# Patient Record
Sex: FEMALE | Race: WHITE | Employment: OTHER | URBAN - METROPOLITAN AREA
[De-identification: names, ages, dates, MRNs, and addresses within clinical notes are randomized per-mention and may not be internally consistent; named-entity substitution may affect disease eponyms.]

---

## 2017-02-16 RX ORDER — OXYBUTYNIN CHLORIDE 10 MG/1
TABLET, EXTENDED RELEASE ORAL
Qty: 90 TABLET | Refills: 3 | Status: SHIPPED | OUTPATIENT
Start: 2017-02-16 | End: 2018-02-11 | Stop reason: SDUPTHER

## 2017-04-12 ENCOUNTER — OFFICE VISIT (OUTPATIENT)
Dept: UROLOGY | Age: 77
End: 2017-04-12

## 2017-04-12 VITALS
SYSTOLIC BLOOD PRESSURE: 130 MMHG | BODY MASS INDEX: 34.99 KG/M2 | HEART RATE: 64 BPM | WEIGHT: 210 LBS | DIASTOLIC BLOOD PRESSURE: 80 MMHG | HEIGHT: 65 IN

## 2017-04-12 DIAGNOSIS — N32.81 OAB (OVERACTIVE BLADDER): Primary | ICD-10-CM

## 2017-04-12 PROCEDURE — 99212 OFFICE O/P EST SF 10 MIN: CPT | Performed by: UROLOGY

## 2017-04-12 RX ORDER — OYSTER SHELL CALCIUM WITH VITAMIN D 500; 200 MG/1; [IU]/1
1 TABLET, FILM COATED ORAL DAILY
COMMUNITY
End: 2021-07-21

## 2017-04-12 RX ORDER — TOLTERODINE 4 MG/1
4 CAPSULE, EXTENDED RELEASE ORAL DAILY
Qty: 90 CAPSULE | Refills: 3 | Status: SHIPPED | OUTPATIENT
Start: 2017-04-12 | End: 2018-05-01 | Stop reason: ALTCHOICE

## 2017-04-12 ASSESSMENT — ENCOUNTER SYMPTOMS: SHORTNESS OF BREATH: 0

## 2018-02-12 RX ORDER — OXYBUTYNIN CHLORIDE 10 MG/1
TABLET, EXTENDED RELEASE ORAL
Qty: 90 TABLET | Refills: 3 | Status: SHIPPED | OUTPATIENT
Start: 2018-02-12 | End: 2019-02-28 | Stop reason: SDUPTHER

## 2018-05-01 ENCOUNTER — OFFICE VISIT (OUTPATIENT)
Dept: UROLOGY | Age: 78
End: 2018-05-01
Payer: MEDICARE

## 2018-05-01 VITALS
DIASTOLIC BLOOD PRESSURE: 70 MMHG | SYSTOLIC BLOOD PRESSURE: 138 MMHG | WEIGHT: 200 LBS | HEART RATE: 62 BPM | HEIGHT: 65 IN | BODY MASS INDEX: 33.32 KG/M2

## 2018-05-01 DIAGNOSIS — N32.81 OAB (OVERACTIVE BLADDER): Primary | ICD-10-CM

## 2018-05-01 LAB
BILIRUBIN, POC: NORMAL
BLOOD URINE, POC: NORMAL
CLARITY, POC: CLEAR
COLOR, POC: YELLOW
GLUCOSE URINE, POC: NORMAL
KETONES, POC: NORMAL
LEUKOCYTE EST, POC: NORMAL
NITRITE, POC: NORMAL
PH, POC: 6
PROTEIN, POC: NORMAL
SPECIFIC GRAVITY, POC: 1.01
UROBILINOGEN, POC: 0.2

## 2018-05-01 PROCEDURE — 81003 URINALYSIS AUTO W/O SCOPE: CPT | Performed by: UROLOGY

## 2018-05-01 PROCEDURE — 99213 OFFICE O/P EST LOW 20 MIN: CPT | Performed by: UROLOGY

## 2018-05-01 ASSESSMENT — ENCOUNTER SYMPTOMS
SHORTNESS OF BREATH: 0
ABDOMINAL PAIN: 0

## 2019-02-06 PROBLEM — I10 ESSENTIAL (PRIMARY) HYPERTENSION: Status: ACTIVE | Noted: 2019-02-06

## 2019-02-06 PROBLEM — D69.6 THROMBOCYTOPENIA, UNSPECIFIED (HCC): Status: ACTIVE | Noted: 2019-02-06

## 2019-02-06 PROBLEM — D69.3 IMMUNE THROMBOCYTOPENIC PURPURA (HCC): Status: ACTIVE | Noted: 2019-02-06

## 2019-02-28 RX ORDER — OXYBUTYNIN CHLORIDE 10 MG/1
TABLET, EXTENDED RELEASE ORAL
Qty: 90 TABLET | Refills: 3 | Status: SHIPPED | OUTPATIENT
Start: 2019-02-28 | End: 2021-04-19

## 2019-05-02 ENCOUNTER — OFFICE VISIT (OUTPATIENT)
Dept: UROLOGY | Age: 79
End: 2019-05-02
Payer: MEDICARE

## 2019-05-02 VITALS
DIASTOLIC BLOOD PRESSURE: 70 MMHG | HEART RATE: 66 BPM | WEIGHT: 210 LBS | BODY MASS INDEX: 35.85 KG/M2 | HEIGHT: 64 IN | SYSTOLIC BLOOD PRESSURE: 142 MMHG

## 2019-05-02 DIAGNOSIS — N32.81 OAB (OVERACTIVE BLADDER): Primary | ICD-10-CM

## 2019-05-02 PROCEDURE — 99213 OFFICE O/P EST LOW 20 MIN: CPT | Performed by: UROLOGY

## 2019-05-02 PROCEDURE — 81003 URINALYSIS AUTO W/O SCOPE: CPT | Performed by: UROLOGY

## 2019-05-02 RX ORDER — OXYBUTYNIN CHLORIDE 15 MG/1
15 TABLET, EXTENDED RELEASE ORAL DAILY
Qty: 90 TABLET | Refills: 3 | Status: SHIPPED | OUTPATIENT
Start: 2019-05-02 | End: 2020-04-22

## 2019-05-02 ASSESSMENT — ENCOUNTER SYMPTOMS
ABDOMINAL PAIN: 0
SHORTNESS OF BREATH: 0
ABDOMINAL DISTENTION: 0

## 2019-05-02 NOTE — PROGRESS NOTES
Subjective:      Patient ID: Rossie Leventhal is a [de-identified] y.o. female. HPI  this is a [de-identified] white female with h/o HTN, GERD, OAB on Ditropan XL 10 mg daily back in follow-up. Since last seen on 5/1/18, she feels the Ditropan is not working as well. She gets occasional urgency and UI. She has no SE from the Ditropan. She has no hematuria or dysuria or pain. She has no new medical or surgical problems. She has no interval UTI's. She has no new surgical or medical problems. Past Medical History:   Diagnosis Date    High blood pressure     HIGH CHOLESTEROL     Overactive bladder      Past Surgical History:   Procedure Laterality Date    CHOLECYSTECTOMY      EYE SURGERY Bilateral     cataract    HYSTERECTOMY       Social History     Socioeconomic History    Marital status:      Spouse name: None    Number of children: None    Years of education: None    Highest education level: None   Occupational History    None   Social Needs    Financial resource strain: None    Food insecurity:     Worry: None     Inability: None    Transportation needs:     Medical: None     Non-medical: None   Tobacco Use    Smoking status: Never Smoker    Smokeless tobacco: Never Used   Substance and Sexual Activity    Alcohol use: Yes     Comment: occasionally    Drug use: No    Sexual activity: None   Lifestyle    Physical activity:     Days per week: None     Minutes per session: None    Stress: None   Relationships    Social connections:     Talks on phone: None     Gets together: None     Attends Taoism service: None     Active member of club or organization: None     Attends meetings of clubs or organizations: None     Relationship status: None    Intimate partner violence:     Fear of current or ex partner: None     Emotionally abused: None     Physically abused: None     Forced sexual activity: None   Other Topics Concern    None   Social History Narrative    None     History reviewed.  No pertinent family history. Current Outpatient Medications   Medication Sig Dispense Refill    oxybutynin (DITROPAN-XL) 10 MG extended release tablet TAKE 1 TABLET DAILY 90 tablet 3    calcium-vitamin D (OSCAL-500) 500-200 MG-UNIT per tablet Take 1 tablet by mouth daily      levothyroxine (SYNTHROID) 125 MCG tablet       amLODIPine (NORVASC) 2.5 MG tablet       Multiple Vitamins-Minerals (PRESERVISION AREDS) CAPS Take by mouth daily      Estradiol (VAGIFEM) 10 MCG TABS Place  vaginally daily.  Loperamide HCl (IMODIUM A-D PO) Take  by mouth as needed.  Multiple Vitamins-Minerals (THERAPEUTIC MULTIVITAMIN-MINERALS) tablet Take 1 tablet by mouth daily.  Omega-3 Fatty Acids (FISH OIL) 1000 MG CAPS Take 3,000 mg by mouth daily.  GLUCOS-CHOND-STEROL-FISH OIL PO Take  by mouth.  Ascorbic Acid (VITAMIN C) 100 MG tablet Take  by mouth daily.  Coenzyme Q10 (COQ-10) 10 MG CAPS Take  by mouth.  losartan (COZAAR) 100 MG tablet Take 100 mg by mouth daily.  Estradiol 10 MCG TABS Place  vaginally.  lovastatin (ALTOPREV) 20 MG ER tablet Take 20 mg by mouth nightly.  omeprazole (PRILOSEC OTC) 20 MG tablet Take 20 mg by mouth daily.  Artificial Tear (GENTEAL) GEL Apply  to eye.  Cholecalciferol (D3-1000 PO) Take  by mouth. No current facility-administered medications for this visit. Patient has no known allergies. reviewed    Review of Systems   Constitutional: Negative for unexpected weight change. Respiratory: Negative for shortness of breath. Cardiovascular: Negative for chest pain. Gastrointestinal: Negative for abdominal distention and abdominal pain. Genitourinary: Negative for difficulty urinating, dysuria and flank pain. Objective:   Physical Exam   Constitutional: She appears well-developed and well-nourished. Abdominal: Soft. She exhibits no distension and no mass. There is no tenderness. There is no guarding. Neurological: She is alert. Psychiatric: She has a normal mood and affect. 5/2/2019 11:13 AM - Edelmira Em CMA (AAMA)     Component Collected Lab   Color, UA 05/02/2019 11:13 AM Unknown   yellow    Clarity, UA 05/02/2019 11:13 AM Unknown   clear    Glucose, UA POC 05/02/2019 11:13 AM Unknown   neg    Bilirubin, UA 05/02/2019 11:13 AM Unknown   neg    Ketones, UA 05/02/2019 11:13 AM Unknown   neg    Spec Grav, UA 05/02/2019 11:13 AM Unknown   1.010    Blood, UA POC 05/02/2019 11:13 AM Unknown   neg    pH, UA 05/02/2019 11:13 AM Unknown   6.0    Protein, UA POC 05/02/2019 11:13 AM Unknown   neg    Urobilinogen, UA 05/02/2019 11:13 AM Unknown   0.2    Leukocytes, UA 05/02/2019 11:13 AM Unknown   neg    Nitrite, UA 05/02/2019 11:13 AM Unknown   neg        Assessment: This is a [de-identified] white female with h/o HTN, GERD, OAB on Ditropan XL 10 mg daily and with slight progression of urgency and UI and no SE from Ditropan. I discussed increasing the dose to 15 mg and she wants to proceed. The proper dosing and possible SE were reviewed. She has a normal U/A and no evidence for UTI by U/A today. Will continue with present management. I spent 15 minutes of which > 50% of the visit was spent counseling and coordinating care wrt the OAB and treatment options. Plan:      1. F/U 1 yr  2.  Ditropan 15 mg Xl daily, #90 with 3 refills        Arnold Gamez MD

## 2019-11-19 LAB
LV EF: 61 %
LVEF MODALITY: NORMAL

## 2019-12-03 LAB
ANION GAP SERPL CALCULATED.3IONS-SCNC: 13 MMOL/L (ref 10–20)
BICARBONATE: 26 MMOL/L (ref 21–32)
CALCIUM SERPL-MCNC: 9.1 MG/DL (ref 8.6–10.3)
CHLORIDE BLD-SCNC: 103 MMOL/L (ref 98–107)
CREAT SERPL-MCNC: 0.63 MG/DL (ref 0.5–1)
ERYTHROCYTE [DISTWIDTH] IN BLOOD BY AUTOMATED COUNT: 14 % (ref 11.5–14)
GFR AFRICAN AMERICAN: >60 ML/MIN/1.73M2
GFR NON-AFRICAN AMERICAN: >60 ML/MIN/1.73M2
GLUCOSE: 89 MG/DL (ref 74–99)
HCT VFR BLD CALC: 41.2 % (ref 36–46)
HEMOGLOBIN: 12.6 G/DL (ref 12–16)
MCHC RBC AUTO-ENTMCNC: 30.6 G/DL (ref 32–36)
MCV RBC AUTO: 87 FL (ref 80–100)
PLATELET # BLD: 396 X10E9/L (ref 150–450)
POTASSIUM SERPL-SCNC: 3.9 MMOL/L (ref 3.5–5.3)
RBC # BLD: 4.74 X10E12/L (ref 4–5.2)
SODIUM BLD-SCNC: 138 MMOL/L (ref 136–145)
UREA NITROGEN: 13 MG/DL (ref 6–23)
WBC: 8.7 X10E9/L (ref 4.4–11.3)

## 2019-12-06 ENCOUNTER — HOSPITAL ENCOUNTER (OUTPATIENT)
Dept: CARDIAC CATH/INVASIVE PROCEDURES | Age: 79
Discharge: HOME | End: 2019-12-06
Attending: INTERNAL MEDICINE | Admitting: INTERNAL MEDICINE
Payer: MEDICARE

## 2019-12-06 VITALS
HEIGHT: 64 IN | WEIGHT: 207 LBS | HEART RATE: 67 BPM | OXYGEN SATURATION: 97 % | SYSTOLIC BLOOD PRESSURE: 163 MMHG | RESPIRATION RATE: 14 BRPM | BODY MASS INDEX: 35.34 KG/M2 | DIASTOLIC BLOOD PRESSURE: 70 MMHG | TEMPERATURE: 97.7 F

## 2019-12-06 LAB
EKG ATRIAL RATE: 61 BPM
EKG P AXIS: 81 DEGREES
EKG P-R INTERVAL: 232 MS
EKG Q-T INTERVAL: 434 MS
EKG QRS DURATION: 106 MS
EKG QTC CALCULATION (BAZETT): 436 MS
EKG R AXIS: -31 DEGREES
EKG T AXIS: 84 DEGREES
EKG VENTRICULAR RATE: 61 BPM

## 2019-12-06 PROCEDURE — 2580000003 HC RX 258: Performed by: INTERNAL MEDICINE

## 2019-12-06 PROCEDURE — 93458 L HRT ARTERY/VENTRICLE ANGIO: CPT | Performed by: INTERNAL MEDICINE

## 2019-12-06 PROCEDURE — 2580000003 HC RX 258

## 2019-12-06 PROCEDURE — 6360000004 HC RX CONTRAST MEDICATION: Performed by: INTERNAL MEDICINE

## 2019-12-06 PROCEDURE — 6360000002 HC RX W HCPCS

## 2019-12-06 PROCEDURE — C1894 INTRO/SHEATH, NON-LASER: HCPCS

## 2019-12-06 PROCEDURE — 2709999900 HC NON-CHARGEABLE SUPPLY

## 2019-12-06 PROCEDURE — 2500000003 HC RX 250 WO HCPCS

## 2019-12-06 PROCEDURE — C1769 GUIDE WIRE: HCPCS

## 2019-12-06 RX ORDER — ISOSORBIDE MONONITRATE 30 MG/1
30 TABLET, EXTENDED RELEASE ORAL DAILY
Status: ON HOLD | COMMUNITY
End: 2019-12-06 | Stop reason: HOSPADM

## 2019-12-06 RX ORDER — SODIUM CHLORIDE 0.9 % (FLUSH) 0.9 %
10 SYRINGE (ML) INJECTION PRN
Status: DISCONTINUED | OUTPATIENT
Start: 2019-12-06 | End: 2019-12-06 | Stop reason: HOSPADM

## 2019-12-06 RX ORDER — ASPIRIN 81 MG/1
81 TABLET, CHEWABLE ORAL DAILY
COMMUNITY

## 2019-12-06 RX ORDER — CLOPIDOGREL BISULFATE 75 MG/1
75 TABLET ORAL DAILY
Status: ON HOLD | COMMUNITY
End: 2019-12-06 | Stop reason: HOSPADM

## 2019-12-06 RX ORDER — NITROGLYCERIN 0.4 MG/1
0.4 TABLET SUBLINGUAL EVERY 5 MIN PRN
Qty: 25 TABLET | Refills: 3 | Status: SHIPPED | OUTPATIENT
Start: 2019-12-06

## 2019-12-06 RX ORDER — SODIUM CHLORIDE 0.9 % (FLUSH) 0.9 %
10 SYRINGE (ML) INJECTION EVERY 12 HOURS SCHEDULED
Status: DISCONTINUED | OUTPATIENT
Start: 2019-12-06 | End: 2019-12-06 | Stop reason: HOSPADM

## 2019-12-06 RX ORDER — ACETAMINOPHEN 325 MG/1
650 TABLET ORAL EVERY 4 HOURS PRN
Status: DISCONTINUED | OUTPATIENT
Start: 2019-12-06 | End: 2019-12-06 | Stop reason: HOSPADM

## 2019-12-06 RX ORDER — SODIUM CHLORIDE 9 MG/ML
INJECTION, SOLUTION INTRAVENOUS CONTINUOUS
Status: DISCONTINUED | OUTPATIENT
Start: 2019-12-06 | End: 2019-12-06 | Stop reason: HOSPADM

## 2019-12-06 RX ORDER — IODIXANOL 320 MG/ML
90 INJECTION, SOLUTION INTRAVASCULAR ONCE
Status: COMPLETED | OUTPATIENT
Start: 2019-12-06 | End: 2019-12-06

## 2019-12-06 RX ADMIN — IODIXANOL 90 ML: 320 INJECTION, SOLUTION INTRAVASCULAR at 16:20

## 2019-12-06 RX ADMIN — SODIUM CHLORIDE: 9 INJECTION, SOLUTION INTRAVENOUS at 17:41

## 2020-04-22 RX ORDER — OXYBUTYNIN CHLORIDE 15 MG/1
TABLET, EXTENDED RELEASE ORAL
Qty: 90 TABLET | Refills: 3 | Status: SHIPPED | OUTPATIENT
Start: 2020-04-22 | End: 2021-05-18

## 2020-07-07 ENCOUNTER — OFFICE VISIT (OUTPATIENT)
Dept: UROLOGY | Age: 80
End: 2020-07-07
Payer: MEDICARE

## 2020-07-07 VITALS
WEIGHT: 210 LBS | HEART RATE: 62 BPM | DIASTOLIC BLOOD PRESSURE: 70 MMHG | SYSTOLIC BLOOD PRESSURE: 138 MMHG | HEIGHT: 65 IN | BODY MASS INDEX: 34.99 KG/M2

## 2020-07-07 PROCEDURE — 99213 OFFICE O/P EST LOW 20 MIN: CPT | Performed by: UROLOGY

## 2020-07-07 ASSESSMENT — ENCOUNTER SYMPTOMS
ABDOMINAL DISTENTION: 0
ABDOMINAL PAIN: 0
SHORTNESS OF BREATH: 0

## 2020-07-07 NOTE — PROGRESS NOTES
History Narrative    None     History reviewed. No pertinent family history. Current Outpatient Medications   Medication Sig Dispense Refill    oxybutynin (DITROPAN XL) 15 MG extended release tablet TAKE 1 TABLET DAILY 90 tablet 3    aspirin 81 MG chewable tablet Take 81 mg by mouth daily      nitroGLYCERIN (NITROSTAT) 0.4 MG SL tablet Place 1 tablet under the tongue every 5 minutes as needed for Chest pain up to max of 3 total doses. If no relief after 1 dose, call 911. 25 tablet 3    oxybutynin (DITROPAN-XL) 10 MG extended release tablet TAKE 1 TABLET DAILY 90 tablet 3    calcium-vitamin D (OSCAL-500) 500-200 MG-UNIT per tablet Take 1 tablet by mouth daily      levothyroxine (SYNTHROID) 125 MCG tablet       amLODIPine (NORVASC) 2.5 MG tablet Take 5 mg by mouth daily       Multiple Vitamins-Minerals (PRESERVISION AREDS) CAPS Take by mouth daily      Multiple Vitamins-Minerals (THERAPEUTIC MULTIVITAMIN-MINERALS) tablet Take 1 tablet by mouth daily.  Omega-3 Fatty Acids (FISH OIL) 1000 MG CAPS Take 3,000 mg by mouth daily.  GLUCOS-CHOND-STEROL-FISH OIL PO Take  by mouth.  Ascorbic Acid (VITAMIN C) 100 MG tablet Take  by mouth daily.  Coenzyme Q10 (COQ-10) 10 MG CAPS Take  by mouth.  losartan (COZAAR) 100 MG tablet Take 100 mg by mouth daily.  lovastatin (ALTOPREV) 20 MG ER tablet Take 20 mg by mouth nightly.  omeprazole (PRILOSEC OTC) 20 MG tablet Take 20 mg by mouth daily       Artificial Tear (GENTEAL) GEL Apply  to eye.  Cholecalciferol (D3-1000 PO) Take  by mouth. No current facility-administered medications for this visit. Patient has no known allergies. reviewed        Review of Systems   Constitutional: Negative for unexpected weight change. Respiratory: Negative for shortness of breath. Cardiovascular: Negative for chest pain. Gastrointestinal: Negative for abdominal distention and abdominal pain.    Genitourinary: Positive for frequency and urgency. Negative for dysuria, flank pain and hematuria. Objective:   Physical Exam  Constitutional:       Appearance: Normal appearance. Neurological:      Mental Status: She is alert. Psychiatric:         Mood and Affect: Mood normal.         Assessment: This is a 77 yo white female with h/o HTN, GERD, OAB on Ditropan XL 10 mg daily (failed higher dosing due to SE) and with  progression of frequency, urgency and UI. I discussed other medication options vs possible referral to Dr Fátima Dominguez for Sarath-Tox, Interstim evaluation and she wants the latter. She will continue with 10 mg Ditropan for now. I spent 15 minutes of which > 50% of the visit was spent counseling and coordinating care wrt the refractory OAB and treatment options.       Plan:      1. F/U 1 yr  2.  Refer to Dr Harvinder Fox MD

## 2020-07-27 ENCOUNTER — OFFICE VISIT (OUTPATIENT)
Dept: OBGYN CLINIC | Age: 80
End: 2020-07-27
Payer: MEDICARE

## 2020-07-27 VITALS
HEIGHT: 65 IN | WEIGHT: 214 LBS | DIASTOLIC BLOOD PRESSURE: 66 MMHG | SYSTOLIC BLOOD PRESSURE: 112 MMHG | BODY MASS INDEX: 35.65 KG/M2 | HEART RATE: 76 BPM

## 2020-07-27 PROCEDURE — 99203 OFFICE O/P NEW LOW 30 MIN: CPT | Performed by: OBSTETRICS & GYNECOLOGY

## 2020-07-27 NOTE — Clinical Note
Please call patient and check if patient has taken her medications she was given samples for mirabegron 25 mg p.o. daily for 2 weeks, and if the medication gave any relief. I asked the patient if she would like to follow-up if the medication has failed to further discuss options or perhaps try a higher dose of the same medication.

## 2020-08-29 NOTE — PROGRESS NOTES
Daniella Bettencourt is a 78 y.o. female who presents here today for complaints of urge urinary incontinence, patient has tried oxybutynin different strengths in the past including Detrol with no success. Patient was referred to me to discuss other options including Botox bladder injections or possible sacral nerve neuromodulation. Refered by Dr. Medardo Izaguirre urology. Vitals:  /66 (Site: Left Upper Arm, Position: Sitting, Cuff Size: Medium Adult)   Pulse 76   Ht 5' 4.5\" (1.638 m)   Wt 214 lb (97.1 kg)   BMI 36.17 kg/m²   Allergies:  Patient has no known allergies. Past Medical History:   Diagnosis Date    High blood pressure     HIGH CHOLESTEROL     Overactive bladder      Past Surgical History:   Procedure Laterality Date    CHOLECYSTECTOMY      EYE SURGERY Bilateral     cataract    HYSTERECTOMY       OB History        6    Para   4    Term                AB        Living           SAB        TAB        Ectopic        Molar        Multiple        Live Births                  No family history on file.   Social History     Socioeconomic History    Marital status:      Spouse name: Not on file    Number of children: Not on file    Years of education: Not on file    Highest education level: Not on file   Occupational History    Not on file   Social Needs    Financial resource strain: Not on file    Food insecurity     Worry: Not on file     Inability: Not on file    Transportation needs     Medical: Not on file     Non-medical: Not on file   Tobacco Use    Smoking status: Never Smoker    Smokeless tobacco: Never Used   Substance and Sexual Activity    Alcohol use: Yes     Comment: occasionally    Drug use: No    Sexual activity: Not on file   Lifestyle    Physical activity     Days per week: Not on file     Minutes per session: Not on file    Stress: Not on file   Relationships    Social connections     Talks on phone: Not on file     Gets together: Not on file Attends Buddhism service: Not on file     Active member of club or organization: Not on file     Attends meetings of clubs or organizations: Not on file     Relationship status: Not on file    Intimate partner violence     Fear of current or ex partner: Not on file     Emotionally abused: Not on file     Physically abused: Not on file     Forced sexual activity: Not on file   Other Topics Concern    Not on file   Social History Narrative    Not on file       Contraceptive method:  none    Patient's medications, allergies, past medical, surgical, social and family histories were reviewed and updated as appropriate. Review of Systems  As per chief complaint   All other systems reviewed and are negative. Physical Exam:  Vitals:  /66 (Site: Left Upper Arm, Position: Sitting, Cuff Size: Medium Adult)   Pulse 76   Ht 5' 4.5\" (1.638 m)   Wt 214 lb (97.1 kg)   BMI 36.17 kg/m²   Lungs: CTAB   Heart : Regular S1/S2, no M/R/G  Abdomen: Soft , NT, ND , + BS   Pelvic exam : deferred    Assessment:      Diagnosis Orders   1. Urge urinary incontinence         Plan:     Patient counseled about Botox as well as sacral nerve neuromodulation given that she had failed 2 previous medical treatments. I also counseled the patient as a last option to use beta 3 adrenergic agonist mirabegron or Myrbetriq as final trial for conservative treatment, given that its mode of action is different than the anticholinergics mentioned above and could be more effective. I also give the patient an option to opt out for 1 of the above-mentioned procedures. Patient mentions she would like to think about the procedure as but also would like to continue conservative treatment at this time, samples for mirabegron 25 mg to be taken once a day for the next 2 weeks given to patient. Patient follow-up in 2 weeks to discuss medication effectiveness    No orders of the defined types were placed in this encounter.     Orders Placed This Encounter   Medications    mirabegron (MYRBETRIQ) 25 MG TB24     Sig: Lot: [id number] Exp: 4/2022     Dispense:  14 tablet     Refill:  0     Patient was seen with total face to face time of 30 minutes. More than 50% of this visit was counseling and education regarding The encounter diagnosis was Urge urinary incontinence. and Incontinence (Discuss Interstim, Botox. Referred by Dr. Alva Estrada. )   as well as  counseling on preventative health maintenance follow-up. Follow Up:  No follow-ups on file.         Carin Phillips MD

## 2020-08-31 ENCOUNTER — OFFICE VISIT (OUTPATIENT)
Dept: OBGYN CLINIC | Age: 80
End: 2020-08-31
Payer: MEDICARE

## 2020-08-31 VITALS
BODY MASS INDEX: 35.82 KG/M2 | HEART RATE: 64 BPM | SYSTOLIC BLOOD PRESSURE: 136 MMHG | HEIGHT: 65 IN | DIASTOLIC BLOOD PRESSURE: 80 MMHG | WEIGHT: 215 LBS

## 2020-08-31 PROCEDURE — 99213 OFFICE O/P EST LOW 20 MIN: CPT | Performed by: OBSTETRICS & GYNECOLOGY

## 2020-08-31 NOTE — Clinical Note
Please check on patient, I presented earlier in the office for treatment of incontinence and was given mirabegron 50 mg, please check if patient has had any benefit, and if she had considered the other treatments discussed in previous visits Botox or sacral nerve neuromodulation [InterStim].

## 2020-09-21 NOTE — PROGRESS NOTES
Medication FollowUp     Leonides Flanagan is a 78y.o. year old female here todiscuss symptoms after use of medications prescribed last visit. Symptoms urgency, frequency, nocturia, incontinence. Medication/s prescribed mirabegron 25 mg 1 tablet p.o. daily. Side effects reported : none. Mentions symptomsimproved : no.     Vitals:  /80 (Site: Left Upper Arm, Position: Sitting, Cuff Size: Large Adult)   Pulse 64   Ht 5' 4.5\" (1.638 m)   Wt 215 lb (97.5 kg)   BMI 36.33 kg/m²   Allergies:  Patient has no known allergies. Past Medical History:   Diagnosis Date    High blood pressure     HIGH CHOLESTEROL     Overactive bladder         Past Surgical History:   Procedure Laterality Date    CHOLECYSTECTOMY      EYE SURGERY Bilateral     cataract    HYSTERECTOMY       OB History        6    Para   4    Term                AB        Living           SAB        TAB        Ectopic        Molar        Multiple        Live Births                  No family history on file.   Social History     Socioeconomic History    Marital status:      Spouse name: Not on file    Number of children: Not on file    Years of education: Not on file    Highest education level: Not on file   Occupational History    Not on file   Social Needs    Financial resource strain: Not on file    Food insecurity     Worry: Not on file     Inability: Not on file    Transportation needs     Medical: Not on file     Non-medical: Not on file   Tobacco Use    Smoking status: Never Smoker    Smokeless tobacco: Never Used   Substance and Sexual Activity    Alcohol use: Yes     Comment: occasionally    Drug use: No    Sexual activity: Not on file   Lifestyle    Physical activity     Days per week: Not on file     Minutes per session: Not on file    Stress: Not on file   Relationships    Social connections     Talks on phone: Not on file     Gets together: Not on file     Attends Bahai service: Not on file , Rfl:     losartan (COZAAR) 100 MG tablet, Take 100 mg by mouth daily. , Disp: , Rfl:     lovastatin (ALTOPREV) 20 MG ER tablet, Take 20 mg by mouth nightly.  , Disp: , Rfl:     omeprazole (PRILOSEC OTC) 20 MG tablet, Take 20 mg by mouth daily , Disp: , Rfl:     Artificial Tear (GENTEAL) GEL, Apply  to eye.  , Disp: , Rfl:     Cholecalciferol (D3-1000 PO), Take  by mouth.  , Disp: , Rfl:     Review of Systems  Review of Systems    All other systems reviewed and are negative. Physical exam :   General Appearance: alert and oriented to person, placeand time, well-developed and well-nourished, in no acute distress  Pulmonary/Chest:clear to auscultation bilaterally- no wheezes, rales or rhonchi, normal air movement,no respiratory distress  Cardiovascular: normal rate, normal S1 and S2, no gallops,intact distal pulses and no carotid bruits  Abdomen: soft, non-tender  Pelvic:deferred    Assessment:      Diagnosis Orders   1. Urge urinary incontinence         Was medication effective in resolving symptomsfor patient ?   no  Plan:     Increase mirabegron to 50 mg 1 tablet p.o. daily patient follow-up in 2 weeks. Have discussed other treatment modalities including Botox and sacral nerve neuromodulation patient on her annual visit, patient would like to continue trying medical treatment at this time would use the increased dose Myrbetriq trial for the next 2 to 4 weeks before deciding on procedure  Mirabegron samples given from our office    No orders of the defined types were placed in this encounter. No orders of the defined types were placed in this encounter. Follow up:  Return in about 2 weeks (around 9/14/2020) for medication assessment.         Meg Osborn MD

## 2020-10-08 NOTE — PROGRESS NOTES
Spoke to pt , she is undecided on what she would like to do. She declines appt at this time , and will call when she has decided.  The medication did not work for her

## 2020-10-09 NOTE — PROGRESS NOTES
Patient seen in ED, has no OBGYN provider on file. Call and check if needs follow up for Urge Urinary Incontinence (Primary)  and to schedule with our office.

## 2021-04-19 ENCOUNTER — OFFICE VISIT (OUTPATIENT)
Dept: UROLOGY | Age: 81
End: 2021-04-19
Payer: MEDICARE

## 2021-04-19 VITALS
HEART RATE: 55 BPM | SYSTOLIC BLOOD PRESSURE: 134 MMHG | DIASTOLIC BLOOD PRESSURE: 82 MMHG | BODY MASS INDEX: 33.97 KG/M2 | WEIGHT: 199 LBS | HEIGHT: 64 IN

## 2021-04-19 DIAGNOSIS — N32.81 OAB (OVERACTIVE BLADDER): ICD-10-CM

## 2021-04-19 DIAGNOSIS — R35.0 FREQUENCY OF URINATION: Primary | ICD-10-CM

## 2021-04-19 LAB
BILIRUBIN, POC: NORMAL
BLOOD URINE, POC: NORMAL
CLARITY, POC: CLEAR
COLOR, POC: YELLOW
GLUCOSE URINE, POC: NORMAL
KETONES, POC: NORMAL
LEUKOCYTE EST, POC: NORMAL
NITRITE, POC: NORMAL
PH, POC: 6.5
PROTEIN, POC: NORMAL
SPECIFIC GRAVITY, POC: 1.01
UROBILINOGEN, POC: 0.2

## 2021-04-19 PROCEDURE — 81003 URINALYSIS AUTO W/O SCOPE: CPT | Performed by: UROLOGY

## 2021-04-19 PROCEDURE — 99212 OFFICE O/P EST SF 10 MIN: CPT | Performed by: UROLOGY

## 2021-04-19 PROCEDURE — 51798 US URINE CAPACITY MEASURE: CPT | Performed by: UROLOGY

## 2021-04-19 ASSESSMENT — ENCOUNTER SYMPTOMS: ABDOMINAL PAIN: 0

## 2021-04-19 NOTE — PROGRESS NOTES
Subjective:      Patient ID: Elle Sarah is a 78 y.o. female. HPI this is a 79 yo white female with h/o HTN, GERD, and refractory OAB failed Ditropan XL 10 and 15 mg daily and Myrbetriq 50 mg started by Dr Darling Llanos last year. He also discussed other options for her OAB. She is considering Interstim and wanted to discuss with me. She feels her OAB has progressed and has no hematuria or pain or dysuria. She has no new medical or surgical problems. She has no interval UTI's.       Past Medical History:   Diagnosis Date    High blood pressure     HIGH CHOLESTEROL     Overactive bladder      Past Surgical History:   Procedure Laterality Date    CHOLECYSTECTOMY      EYE SURGERY Bilateral     cataract    HYSTERECTOMY       Social History     Socioeconomic History    Marital status:      Spouse name: None    Number of children: None    Years of education: None    Highest education level: None   Occupational History    None   Social Needs    Financial resource strain: None    Food insecurity     Worry: None     Inability: None    Transportation needs     Medical: None     Non-medical: None   Tobacco Use    Smoking status: Never Smoker    Smokeless tobacco: Never Used   Substance and Sexual Activity    Alcohol use: Yes     Comment: occasionally    Drug use: No    Sexual activity: None   Lifestyle    Physical activity     Days per week: None     Minutes per session: None    Stress: None   Relationships    Social connections     Talks on phone: None     Gets together: None     Attends Christianity service: None     Active member of club or organization: None     Attends meetings of clubs or organizations: None     Relationship status: None    Intimate partner violence     Fear of current or ex partner: None     Emotionally abused: None     Physically abused: None     Forced sexual activity: None   Other Topics Concern    None   Social History Narrative    None     History reviewed.  No pertinent family history. Current Outpatient Medications   Medication Sig Dispense Refill    oxybutynin (DITROPAN XL) 15 MG extended release tablet TAKE 1 TABLET DAILY 90 tablet 3    aspirin 81 MG chewable tablet Take 81 mg by mouth daily      nitroGLYCERIN (NITROSTAT) 0.4 MG SL tablet Place 1 tablet under the tongue every 5 minutes as needed for Chest pain up to max of 3 total doses. If no relief after 1 dose, call 911. 25 tablet 3    calcium-vitamin D (OSCAL-500) 500-200 MG-UNIT per tablet Take 1 tablet by mouth daily      levothyroxine (SYNTHROID) 125 MCG tablet       amLODIPine (NORVASC) 2.5 MG tablet Take 5 mg by mouth daily       Multiple Vitamins-Minerals (PRESERVISION AREDS) CAPS Take by mouth daily      Multiple Vitamins-Minerals (THERAPEUTIC MULTIVITAMIN-MINERALS) tablet Take 1 tablet by mouth daily.  Omega-3 Fatty Acids (FISH OIL) 1000 MG CAPS Take 3,000 mg by mouth daily.  GLUCOS-CHOND-STEROL-FISH OIL PO Take  by mouth.  Ascorbic Acid (VITAMIN C) 100 MG tablet Take  by mouth daily.  Coenzyme Q10 (COQ-10) 10 MG CAPS Take  by mouth.  losartan (COZAAR) 100 MG tablet Take 100 mg by mouth daily.  lovastatin (ALTOPREV) 20 MG ER tablet Take 20 mg by mouth nightly.  omeprazole (PRILOSEC OTC) 20 MG tablet Take 20 mg by mouth daily       Artificial Tear (GENTEAL) GEL Apply  to eye.  Cholecalciferol (D3-1000 PO) Take  by mouth. No current facility-administered medications for this visit. Patient has no known allergies. reviewed    Review of Systems   Gastrointestinal: Negative for abdominal pain. Genitourinary: Positive for frequency and urgency. Negative for dysuria, flank pain and hematuria. Objective:   Physical Exam  Constitutional:       Appearance: Normal appearance. Abdominal:      General: There is no distension. Neurological:      Mental Status: She is alert.    Psychiatric:         Mood and Affect: Mood normal. 4/19/2021  1:33 PM - Yakov Brown CMA (AAMA)    Component Collected Lab   Color, UA 04/19/2021  1:32 PM Unknown   yellow    Clarity, UA 04/19/2021  1:32 PM Unknown   clear    Glucose, UA POC 04/19/2021  1:32 PM Unknown   neg    Bilirubin, UA 04/19/2021  1:32 PM Unknown   neg    Ketones, UA 04/19/2021  1:32 PM Unknown   neg    Spec Grav, UA 04/19/2021  1:32 PM Unknown   1.010    Blood, UA POC 04/19/2021  1:32 PM Unknown   neg    pH, UA 04/19/2021  1:32 PM Unknown   6.5    Protein, UA POC 04/19/2021  1:32 PM Unknown   neg    Urobilinogen, UA 04/19/2021  1:32 PM Unknown   0.2    Leukocytes, UA 04/19/2021  1:32 PM Unknown   neg    Nitrite, UA 04/19/2021  1:32 PM Unknown   neg    Lab and Collection      post void residual by U/S: 82 cc    Assessment: This is a 77 yo white female with h/o HTN, GERD, OAB and failed multiple medication trial and has continued progression of frequency, urgency and UI. I encouraged her to consider Interstim trial and she wants to proceed. Plan:      1.  F/U prn  2 refer back to Dr Angela Dowell for Sussy Simon MD

## 2021-05-10 ENCOUNTER — OFFICE VISIT (OUTPATIENT)
Dept: OBGYN CLINIC | Age: 81
End: 2021-05-10
Payer: MEDICARE

## 2021-05-10 VITALS
DIASTOLIC BLOOD PRESSURE: 70 MMHG | BODY MASS INDEX: 33.97 KG/M2 | SYSTOLIC BLOOD PRESSURE: 116 MMHG | WEIGHT: 199 LBS | HEART RATE: 68 BPM | HEIGHT: 64 IN

## 2021-05-10 DIAGNOSIS — N39.41 URGE URINARY INCONTINENCE: Primary | ICD-10-CM

## 2021-05-10 PROCEDURE — 99213 OFFICE O/P EST LOW 20 MIN: CPT | Performed by: OBSTETRICS & GYNECOLOGY

## 2021-05-18 RX ORDER — OXYBUTYNIN CHLORIDE 15 MG/1
TABLET, EXTENDED RELEASE ORAL
Qty: 90 TABLET | Refills: 3 | Status: SHIPPED | OUTPATIENT
Start: 2021-05-18 | End: 2022-05-02

## 2021-06-04 NOTE — PROGRESS NOTES
Difficulty of Paying Living Expenses:    Food Insecurity:     Worried About Running Out of Food in the Last Year:     [address] in the Last Year:    Transportation Needs:     Lack of Transportation (Medical):  Lack of Transportation (Non-Medical):    Physical Activity:     Days of Exercise per Week:     Minutes of Exercise per Session:    Stress:     Feeling of Stress :    Social Connections:     Frequency of Communication with Friends and Family:     Frequency of Social Gatherings with Friends and Family:     Attends Sabianism Services:     Active Member of Clubs or Organizations:     Attends Club or Organization Meetings:     Marital Status:    Intimate Partner Violence:     Fear of Current or Ex-Partner:     Emotionally Abused:     Physically Abused:     Sexually Abused:        Contraceptive method:  none    Patient's medications, allergies, past medical, surgical, social and family histories were reviewed and updated as appropriate. Review of Systems  As per chief complaint   All other systems reviewed and are negative. urgency, frequency, incontinence, nocturia    Physical Exam:  Vitals:  /70 (Site: Left Upper Arm, Position: Sitting, Cuff Size: Large Adult)   Pulse 68   Ht 5' 4\" (1.626 m)   Wt 199 lb (90.3 kg)   BMI 34.16 kg/m²   Lungs: CTAB   Heart : Regular S1/S2, no M/R/G  Abdomen: Soft , NT, ND , + BS   Pelvic exam : deferred    Assessment:      Diagnosis Orders   1. Urge urinary incontinence         Plan:     Patient has failed several medications. Mirabegron used 1 year ago was not effective. I discussed botox bladder injections vs Sacral neuromodulation   Patient would like to discuss with family and return with final decision       No orders of the defined types were placed in this encounter. No orders of the defined types were placed in this encounter. Follow Up:  Return if symptoms worsen or fail to improve.         Princess James MD

## 2021-06-15 ENCOUNTER — PROCEDURE VISIT (OUTPATIENT)
Dept: OBGYN CLINIC | Age: 81
End: 2021-06-15
Payer: MEDICARE

## 2021-06-15 VITALS — BODY MASS INDEX: 34.16 KG/M2 | HEIGHT: 64 IN

## 2021-06-15 DIAGNOSIS — N39.41 URGE URINARY INCONTINENCE: Primary | ICD-10-CM

## 2021-06-15 PROCEDURE — 64561 IMPLANT NEUROELECTRODES: CPT | Performed by: OBSTETRICS & GYNECOLOGY

## 2021-06-15 NOTE — PROGRESS NOTES
Percutaneous nerve stimulation procedure     After procedure explained, verbal consent obtained, patient was prepped and draped for procedure. Patient laying flat on stomach. A measuring tape used and 11 cm from coccyx was measured and marked. 2 points were then marked in each side from the midline, 2 cm above and lateral to the 11 cm midline point marked earlier. 5 cc of local anaesthetic was then injected at these points and along a line at a 60 degrees angle directed towards patients legs , along the imaginary line of insertion to the sacral bone. The spinal needle provided with the interstim kit was then used and introduced at the right marked point at a 60 degree angle and advanced slowly till it hit resistence from sacral bone. More local anaethetic was injected at that point. The needle was withdrawn a little calibrated and reinserted 2 to 3 times until a spongy sensation was felt as the needle was introduced, with no bony resistence, at that point the needle was assumed to have entered the S3 sacral foramen. The interstim electrode was then hooked to needle and the patient did report fluttering sensation in rectal or vaginal areas or both, confirming probable proper placement of needle. Same steps were carried out on the left side. The wires provided were then introduced through the hollow of the spiral needles introduced earlier. At that point procedure was complete. Patient tolerated procedure. Kita Lizama M.D., F.A.C.O. G

## 2021-06-21 ENCOUNTER — TELEPHONE (OUTPATIENT)
Dept: OBGYN CLINIC | Age: 81
End: 2021-06-21

## 2021-06-21 NOTE — TELEPHONE ENCOUNTER
Patient had to reshcedule appointment on 6/25/2021, she states that she has vertigo. She has kyle rescheduled for 7/6/2021 @ 1:45pm, she would like to know if she would be able to take a shower.

## 2021-06-22 NOTE — TELEPHONE ENCOUNTER
Patient needs to be seen sooner than 7/6/21. The leads from PNE need to be taken out. She cannot take a full shower until those wires are removed.

## 2021-06-29 ENCOUNTER — OFFICE VISIT (OUTPATIENT)
Dept: OBGYN CLINIC | Age: 81
End: 2021-06-29
Payer: MEDICARE

## 2021-06-29 VITALS — WEIGHT: 200 LBS | BODY MASS INDEX: 34.33 KG/M2 | DIASTOLIC BLOOD PRESSURE: 74 MMHG | SYSTOLIC BLOOD PRESSURE: 136 MMHG

## 2021-06-29 DIAGNOSIS — N39.41 URGE URINARY INCONTINENCE: Primary | ICD-10-CM

## 2021-06-29 PROCEDURE — 99213 OFFICE O/P EST LOW 20 MIN: CPT | Performed by: OBSTETRICS & GYNECOLOGY

## 2021-06-29 RX ORDER — AMLODIPINE BESYLATE 5 MG/1
TABLET ORAL
COMMUNITY
Start: 2021-05-26

## 2021-06-29 RX ORDER — LEVOTHYROXINE SODIUM 112 MCG
112 TABLET ORAL DAILY
COMMUNITY
Start: 2021-06-07

## 2021-06-29 RX ORDER — ATORVASTATIN CALCIUM 20 MG/1
TABLET, FILM COATED ORAL
COMMUNITY
Start: 2021-05-26

## 2021-06-29 NOTE — PROGRESS NOTES
Kathia Valladares is a 78 y.o. female who presents here today for complaints of successful PNE trial for refractory urge incontinence failed multiple medical treatments in the past..  Mentions PNE improved her symptoms up to 80 and 90% at some days. Patient has had the PNE trial for the past 2 weeks and mentions that the device was not working as well for the past 3 or 4 days. Patient was not able to get in to take out the electrodes a week ago. Vitals:  /74   Wt 200 lb (90.7 kg)   BMI 34.33 kg/m²   Allergies:  Patient has no known allergies. Past Medical History:   Diagnosis Date    High blood pressure     HIGH CHOLESTEROL     Overactive bladder      Past Surgical History:   Procedure Laterality Date    CHOLECYSTECTOMY      EYE SURGERY Bilateral     cataract    HYSTERECTOMY       OB History        6    Para   4    Term                AB        Living           SAB        TAB        Ectopic        Molar        Multiple        Live Births                  No family history on file. Social History     Socioeconomic History    Marital status:      Spouse name: Not on file    Number of children: Not on file    Years of education: Not on file    Highest education level: Not on file   Occupational History    Not on file   Tobacco Use    Smoking status: Never Smoker    Smokeless tobacco: Never Used   Substance and Sexual Activity    Alcohol use: Yes     Comment: occasionally    Drug use: No    Sexual activity: Not on file   Other Topics Concern    Not on file   Social History Narrative    Not on file     Social Determinants of Health     Financial Resource Strain:     Difficulty of Paying Living Expenses:    Food Insecurity:     Worried About Running Out of Food in the Last Year:     [address] in the Last Year:    Transportation Needs:     Lack of Transportation (Medical):      Lack of Transportation (Non-Medical):    Physical Activity:     Days of Exercise per Week:     Minutes of Exercise per Session:    Stress:     Feeling of Stress :    Social Connections:     Frequency of Communication with Friends and Family:     Frequency of Social Gatherings with Friends and Family:     Attends Pent[employer] Services:     Active Member of Clubs or Organizations:     Attends Club or Organization Meetings:     Marital Status:    Intimate Partner Violence:     Fear of Current or Ex-Partner:     Emotionally Abused:     Physically Abused:     Sexually Abused:        Contraceptive method:  none    Patient's medications, allergies, past medical, surgical, social and family histories were reviewed and updated as appropriate. Review of Systems  As per chief complaint   All other systems reviewed and are negative. Physical Exam:  Vitals:  /74   Wt 200 lb (90.7 kg)   BMI 34.33 kg/m²   Lungs: CTAB   Heart : Regular S1/S2, no M/R/G  Abdomen: Soft , NT, ND , + BS   Pelvic exam : deferred    Assessment:      Diagnosis Orders   1. Urge urinary incontinence         Plan:     Successful PNE  To be scheduled for stage I and stage II InterStim  Sacral neuromodulation and PNE counseling   If your  symptoms have improved by at least 50 percent, you will be given the option to proceed with  InterStim placement. This procedure will be scheduled at the next available date convenient for you. InterStim Placement:  A permanent wire (lead) will be inserted and attached to a small neurostimulator battery that will be  implanted under the skin in the upper buttock. This will be done in the operating room as an  outpatient procedure with a local anesthetic and I.V. sedation. After the InterStim is implanted, you  will be given a patient  to adjust the stimulation from outside of the body. InterStim  batteries last from three to six years.  You may need to be seen in the office occasionally to have your  InterStim reprogrammed based on changes in your urinary

## 2021-07-21 ENCOUNTER — HOSPITAL ENCOUNTER (OUTPATIENT)
Dept: PREADMISSION TESTING | Age: 81
Discharge: HOME | End: 2021-07-25
Payer: MEDICARE

## 2021-07-21 VITALS
HEART RATE: 59 BPM | WEIGHT: 202.13 LBS | BODY MASS INDEX: 35.81 KG/M2 | SYSTOLIC BLOOD PRESSURE: 146 MMHG | TEMPERATURE: 98.4 F | OXYGEN SATURATION: 96 % | DIASTOLIC BLOOD PRESSURE: 68 MMHG | RESPIRATION RATE: 16 BRPM | HEIGHT: 63 IN

## 2021-07-21 LAB
ANION GAP SERPL CALCULATED.3IONS-SCNC: 11 MEQ/L (ref 9–15)
BUN BLDV-MCNC: 12 MG/DL (ref 8–23)
CALCIUM SERPL-MCNC: 8.9 MG/DL (ref 8.5–9.9)
CHLORIDE BLD-SCNC: 104 MEQ/L (ref 95–107)
CO2: 24 MEQ/L (ref 20–31)
CREAT SERPL-MCNC: 0.46 MG/DL (ref 0.5–0.9)
GFR AFRICAN AMERICAN: >60
GFR NON-AFRICAN AMERICAN: >60
GLUCOSE BLD-MCNC: 88 MG/DL (ref 70–99)
HCT VFR BLD CALC: 40.8 % (ref 37–47)
HEMOGLOBIN: 13.1 G/DL (ref 12–16)
MCH RBC QN AUTO: 26.5 PG (ref 27–31.3)
MCHC RBC AUTO-ENTMCNC: 32.1 % (ref 33–37)
MCV RBC AUTO: 82.8 FL (ref 82–100)
PDW BLD-RTO: 14.1 % (ref 11.5–14.5)
PLATELET # BLD: 325 K/UL (ref 130–400)
POTASSIUM SERPL-SCNC: 4 MEQ/L (ref 3.4–4.9)
RBC # BLD: 4.93 M/UL (ref 4.2–5.4)
SODIUM BLD-SCNC: 139 MEQ/L (ref 135–144)
WBC # BLD: 7.1 K/UL (ref 4.8–10.8)

## 2021-07-21 PROCEDURE — 93005 ELECTROCARDIOGRAM TRACING: CPT | Performed by: OBSTETRICS & GYNECOLOGY

## 2021-07-21 PROCEDURE — 85027 COMPLETE CBC AUTOMATED: CPT

## 2021-07-21 PROCEDURE — 80048 BASIC METABOLIC PNL TOTAL CA: CPT

## 2021-07-22 LAB
EKG ATRIAL RATE: 57 BPM
EKG P-R INTERVAL: 220 MS
EKG Q-T INTERVAL: 422 MS
EKG QRS DURATION: 108 MS
EKG QTC CALCULATION (BAZETT): 410 MS
EKG R AXIS: -38 DEGREES
EKG T AXIS: 94 DEGREES
EKG VENTRICULAR RATE: 57 BPM

## 2021-07-22 PROCEDURE — 93010 ELECTROCARDIOGRAM REPORT: CPT | Performed by: INTERNAL MEDICINE

## 2021-07-27 ENCOUNTER — ANESTHESIA EVENT (OUTPATIENT)
Dept: OPERATING ROOM | Age: 81
End: 2021-07-27
Payer: MEDICARE

## 2021-07-27 NOTE — ANESTHESIA PRE PROCEDURE
Department of Anesthesiology  Preprocedure Note       Name:  Christina Edwards   Age:  78 y.o.  :  1940                                          MRN:  [id number]         Date:  2021      Surgeon: Hermann Brennan):  Fredy Ortega MD    Procedure: Procedure(s):  STAGE 1 & STAGE 2 INTERSTIM    Medications prior to admission:   Prior to Admission medications    Medication Sig Start Date End Date Taking? Authorizing Provider   amLODIPine (NORVASC) 5 MG tablet TAKE 1 TABLET BY MOUTH DAILY 21   Historical Provider, MD   SYNTHROID 112 MCG tablet  21   Historical Provider, MD   atorvastatin (LIPITOR) 20 MG tablet TAKE 1 TABLET BY MOUTH DAILY AT BEDTIME 21   Historical Provider, MD   oxybutynin (DITROPAN XL) 15 MG extended release tablet TAKE 1 TABLET DAILY 21   Bindu Hill MD   aspirin 81 MG chewable tablet Take 81 mg by mouth daily    Historical Provider, MD   nitroGLYCERIN (NITROSTAT) 0.4 MG SL tablet Place 1 tablet under the tongue every 5 minutes as needed for Chest pain up to max of 3 total doses. If no relief after 1 dose, call 911. 19   Laith Maldonado MD   Multiple Vitamins-Minerals (PRESERVISION AREDS) CAPS Take by mouth daily    Historical Provider, MD   Multiple Vitamins-Minerals (THERAPEUTIC MULTIVITAMIN-MINERALS) tablet Take 1 tablet by mouth daily. Historical Provider, MD   losartan (COZAAR) 100 MG tablet Take 100 mg by mouth daily. Historical Provider, MD   omeprazole (PRILOSEC OTC) 20 MG tablet Take 20 mg by mouth daily     Historical Provider, MD   Artificial Tear (GENTEAL) GEL Apply  to eye. Historical Provider, MD   Cholecalciferol (D3-1000 PO) Take  by mouth. Historical Provider, MD       Current medications:    No current facility-administered medications for this encounter.      Current Outpatient Medications   Medication Sig Dispense Refill    amLODIPine (NORVASC) 5 MG tablet TAKE 1 TABLET BY MOUTH DAILY      SYNTHROID 112 MCG tablet  atorvastatin (LIPITOR) 20 MG tablet TAKE 1 TABLET BY MOUTH DAILY AT BEDTIME      oxybutynin (DITROPAN XL) 15 MG extended release tablet TAKE 1 TABLET DAILY 90 tablet 3    aspirin 81 MG chewable tablet Take 81 mg by mouth daily      nitroGLYCERIN (NITROSTAT) 0.4 MG SL tablet Place 1 tablet under the tongue every 5 minutes as needed for Chest pain up to max of 3 total doses. If no relief after 1 dose, call 911. 25 tablet 3    Multiple Vitamins-Minerals (PRESERVISION AREDS) CAPS Take by mouth daily      Multiple Vitamins-Minerals (THERAPEUTIC MULTIVITAMIN-MINERALS) tablet Take 1 tablet by mouth daily.  losartan (COZAAR) 100 MG tablet Take 100 mg by mouth daily.  omeprazole (PRILOSEC OTC) 20 MG tablet Take 20 mg by mouth daily       Artificial Tear (GENTEAL) GEL Apply  to eye.  Cholecalciferol (D3-1000 PO) Take  by mouth. 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    Thrombocytopenia, unspecified ([facility]) D69.6    Essential (primary) hypertension I10       Past Medical History:        Diagnosis Date    High blood pressure     HIGH CHOLESTEROL     Overactive bladder     Thyroid disease        Past Surgical History:        Procedure Laterality Date    CHOLECYSTECTOMY      EYE SURGERY Bilateral     cataract    HYSTERECTOMY         Social History:    Social History     Tobacco Use    Smoking status: Never Smoker    Smokeless tobacco: Never Used   Substance Use Topics    Alcohol use: Yes     Comment: occasionally                                Counseling given: Not Answered      Vital Signs (Current): There were no vitals filed for this visit.                                            BP Readings from Last 3 Encounters:   07/21/21 (!) 146/68   06/29/21 136/74   05/10/21 116/70       NPO Status:                                                                                 BMI: Wt Readings from Last 3 Encounters:   07/21/21 202 lb 2 oz (91.7 kg)   06/29/21 200 lb (90.7 kg)   05/10/21 199 lb (90.3 kg)     There is no height or weight on file to calculate BMI.    CBC:   Lab Results   Component Value Date    WBC 7.1 07/21/2021    RBC 4.93 07/21/2021    RBC 4.01 01/13/2012    HGB 13.1 07/21/2021    HCT 40.8 07/21/2021    MCV 82.8 07/21/2021    RDW 14.1 07/21/2021     07/21/2021       CMP:   Lab Results   Component Value Date     07/21/2021    K 4.0 07/21/2021     07/21/2021    CO2 24 07/21/2021    BUN 12 07/21/2021    CREATININE 0.46 07/21/2021    GFRAA >60.0 07/21/2021    LABGLOM >60.0 07/21/2021    GLUCOSE 88 07/21/2021    GLUCOSE 89 12/03/2019    PROT 6.6 01/21/2021    CALCIUM 8.9 07/21/2021    BILITOT 0.3 01/21/2021    ALKPHOS 91 01/21/2021    AST 15 01/21/2021    ALT 12 01/21/2021       POC Tests: No results for input(s): POCGLU, POCNA, POCK, POCCL, POCBUN, POCHEMO, POCHCT in the last 72 hours.     Coags:   Lab Results   Component Value Date    PROTIME 10.5 12/15/2011    INR 1.0 12/15/2011       HCG (If Applicable): No results found for: PREGTESTUR, PREGSERUM, HCG, HCGQUANT     ABGs: No results found for: PHART, PO2ART, ZTB1YGG, XAN5ULA, BEART, G0NNOBKQ     Type & Screen (If Applicable):  No results found for: LABABO, LABRH    Drug/Infectious Status (If Applicable):  No results found for: HIV, HEPCAB    COVID-19 Screening (If Applicable): No results found for: COVID19        Anesthesia Evaluation  Patient summary reviewed and Nursing notes reviewed no history of anesthetic complications:   Airway: Mallampati: II  TM distance: >3 FB   Neck ROM: full  Mouth opening: > = 3 FB Dental: normal exam         Pulmonary:Negative Pulmonary ROS                              Cardiovascular:    (+) hypertension:, hyperlipidemia      ECG reviewed               Beta Blocker:  Not on Beta Blocker         Neuro/Psych:   Negative Neuro/Psych ROS              GI/Hepatic/Renal:   (+) morbid obesity (class II obesity)          Endo/Other: Negative Endo/Other ROS                    Abdominal:             Vascular: negative vascular ROS. Other Findings:             Anesthesia Plan      general     ASA 3       Induction: intravenous. MIPS: Postoperative opioids intended and Prophylactic antiemetics administered. Anesthetic plan and risks discussed with patient.         Attending anesthesiologist reviewed and agrees with Valery Carvalho MD   7/27/2021

## 2021-07-28 ENCOUNTER — APPOINTMENT (OUTPATIENT)
Dept: GENERAL RADIOLOGY | Age: 81
End: 2021-07-28
Attending: OBSTETRICS & GYNECOLOGY
Payer: MEDICARE

## 2021-07-28 ENCOUNTER — HOSPITAL ENCOUNTER (OUTPATIENT)
Age: 81
Setting detail: OUTPATIENT SURGERY
Discharge: HOME | End: 2021-07-28
Attending: OBSTETRICS & GYNECOLOGY | Admitting: OBSTETRICS & GYNECOLOGY
Payer: MEDICARE

## 2021-07-28 ENCOUNTER — ANESTHESIA (OUTPATIENT)
Dept: OPERATING ROOM | Age: 81
End: 2021-07-28
Payer: MEDICARE

## 2021-07-28 VITALS
HEART RATE: 59 BPM | WEIGHT: 202.13 LBS | BODY MASS INDEX: 35.81 KG/M2 | DIASTOLIC BLOOD PRESSURE: 58 MMHG | HEIGHT: 63 IN | OXYGEN SATURATION: 96 % | TEMPERATURE: 97.7 F | RESPIRATION RATE: 16 BRPM | SYSTOLIC BLOOD PRESSURE: 132 MMHG

## 2021-07-28 VITALS
TEMPERATURE: 94.6 F | SYSTOLIC BLOOD PRESSURE: 199 MMHG | DIASTOLIC BLOOD PRESSURE: 87 MMHG | OXYGEN SATURATION: 91 % | RESPIRATION RATE: 13 BRPM

## 2021-07-28 DIAGNOSIS — G89.18 POSTOPERATIVE PAIN: Primary | ICD-10-CM

## 2021-07-28 PROCEDURE — C1778 LEAD, NEUROSTIMULATOR: HCPCS | Performed by: OBSTETRICS & GYNECOLOGY

## 2021-07-28 PROCEDURE — 2709999900 HC NON-CHARGEABLE SUPPLY: Performed by: OBSTETRICS & GYNECOLOGY

## 2021-07-28 PROCEDURE — 3700000000 HC ANESTHESIA ATTENDED CARE: Performed by: OBSTETRICS & GYNECOLOGY

## 2021-07-28 PROCEDURE — 2720000010 HC SURG SUPPLY STERILE: Performed by: OBSTETRICS & GYNECOLOGY

## 2021-07-28 PROCEDURE — 3700000001 HC ADD 15 MINUTES (ANESTHESIA): Performed by: OBSTETRICS & GYNECOLOGY

## 2021-07-28 PROCEDURE — 95972 ALYS CPLX SP/PN NPGT W/PRGRM: CPT | Performed by: OBSTETRICS & GYNECOLOGY

## 2021-07-28 PROCEDURE — 6360000002 HC RX W HCPCS: Performed by: OBSTETRICS & GYNECOLOGY

## 2021-07-28 PROCEDURE — 3600000014 HC SURGERY LEVEL 4 ADDTL 15MIN: Performed by: OBSTETRICS & GYNECOLOGY

## 2021-07-28 PROCEDURE — 7100000010 HC PHASE II RECOVERY - FIRST 15 MIN: Performed by: OBSTETRICS & GYNECOLOGY

## 2021-07-28 PROCEDURE — 3600000004 HC SURGERY LEVEL 4 BASE: Performed by: OBSTETRICS & GYNECOLOGY

## 2021-07-28 PROCEDURE — C1781 MESH (IMPLANTABLE): HCPCS | Performed by: OBSTETRICS & GYNECOLOGY

## 2021-07-28 PROCEDURE — 2580000003 HC RX 258: Performed by: OBSTETRICS & GYNECOLOGY

## 2021-07-28 PROCEDURE — 7100000000 HC PACU RECOVERY - FIRST 15 MIN: Performed by: OBSTETRICS & GYNECOLOGY

## 2021-07-28 PROCEDURE — 7100000011 HC PHASE II RECOVERY - ADDTL 15 MIN: Performed by: OBSTETRICS & GYNECOLOGY

## 2021-07-28 PROCEDURE — 2500000003 HC RX 250 WO HCPCS: Performed by: ANESTHESIOLOGY

## 2021-07-28 PROCEDURE — 64590 INS/RPL PRPH SAC/GSTR NPG/R: CPT | Performed by: OBSTETRICS & GYNECOLOGY

## 2021-07-28 PROCEDURE — 3209999900 FLUORO FOR SURGICAL PROCEDURES

## 2021-07-28 PROCEDURE — 7100000001 HC PACU RECOVERY - ADDTL 15 MIN: Performed by: OBSTETRICS & GYNECOLOGY

## 2021-07-28 PROCEDURE — C1767 GENERATOR, NEURO NON-RECHARG: HCPCS | Performed by: OBSTETRICS & GYNECOLOGY

## 2021-07-28 PROCEDURE — 2580000003 HC RX 258: Performed by: ANESTHESIOLOGY

## 2021-07-28 PROCEDURE — 6360000002 HC RX W HCPCS: Performed by: ANESTHESIOLOGY

## 2021-07-28 PROCEDURE — 2500000003 HC RX 250 WO HCPCS: Performed by: OBSTETRICS & GYNECOLOGY

## 2021-07-28 PROCEDURE — 64581 OPN IMPLTJ NEA SACRAL NERVE: CPT | Performed by: OBSTETRICS & GYNECOLOGY

## 2021-07-28 PROCEDURE — 76000 FLUOROSCOPY <1 HR PHYS/QHP: CPT | Performed by: OBSTETRICS & GYNECOLOGY

## 2021-07-28 DEVICE — POUCH TYRX NEURO ANTIMICROBIAL MED: Type: IMPLANTABLE DEVICE | Site: BUTTOCKS | Status: FUNCTIONAL

## 2021-07-28 DEVICE — KIT LEAD SURE SCAN INTERSTIM MRI: Type: IMPLANTABLE DEVICE | Site: SACRUM | Status: FUNCTIONAL

## 2021-07-28 DEVICE — Z DUP USE 2628873 GENERATOR NEUROSTIMULATOR H1.7XL2IN THK3IN TORQ WRNCH PROD: Type: IMPLANTABLE DEVICE | Site: BUTTOCKS | Status: FUNCTIONAL

## 2021-07-28 RX ORDER — SODIUM CHLORIDE 0.9 % (FLUSH) 0.9 %
10 SYRINGE (ML) INJECTION PRN
Status: DISCONTINUED | OUTPATIENT
Start: 2021-07-28 | End: 2021-07-28 | Stop reason: HOSPADM

## 2021-07-28 RX ORDER — PROPOFOL 10 MG/ML
INJECTION, EMULSION INTRAVENOUS PRN
Status: DISCONTINUED | OUTPATIENT
Start: 2021-07-28 | End: 2021-07-28 | Stop reason: SDUPTHER

## 2021-07-28 RX ORDER — FENTANYL CITRATE 50 UG/ML
INJECTION, SOLUTION INTRAMUSCULAR; INTRAVENOUS PRN
Status: DISCONTINUED | OUTPATIENT
Start: 2021-07-28 | End: 2021-07-28 | Stop reason: SDUPTHER

## 2021-07-28 RX ORDER — MAGNESIUM HYDROXIDE 1200 MG/15ML
LIQUID ORAL PRN
Status: DISCONTINUED | OUTPATIENT
Start: 2021-07-28 | End: 2021-07-28 | Stop reason: ALTCHOICE

## 2021-07-28 RX ORDER — FENTANYL CITRATE 50 UG/ML
50 INJECTION, SOLUTION INTRAMUSCULAR; INTRAVENOUS EVERY 10 MIN PRN
Status: DISCONTINUED | OUTPATIENT
Start: 2021-07-28 | End: 2021-07-28 | Stop reason: HOSPADM

## 2021-07-28 RX ORDER — CEPHALEXIN 500 MG/1
500 CAPSULE ORAL 4 TIMES DAILY
Qty: 28 CAPSULE | Refills: 0 | Status: ON HOLD | OUTPATIENT
Start: 2021-07-28 | End: 2022-01-16 | Stop reason: HOSPADM

## 2021-07-28 RX ORDER — MEPERIDINE HYDROCHLORIDE 25 MG/ML
12.5 INJECTION INTRAMUSCULAR; INTRAVENOUS; SUBCUTANEOUS EVERY 5 MIN PRN
Status: DISCONTINUED | OUTPATIENT
Start: 2021-07-28 | End: 2021-07-28 | Stop reason: HOSPADM

## 2021-07-28 RX ORDER — SODIUM CHLORIDE 9 MG/ML
25 INJECTION, SOLUTION INTRAVENOUS PRN
Status: DISCONTINUED | OUTPATIENT
Start: 2021-07-28 | End: 2021-07-28 | Stop reason: HOSPADM

## 2021-07-28 RX ORDER — LIDOCAINE HYDROCHLORIDE 10 MG/ML
1 INJECTION, SOLUTION EPIDURAL; INFILTRATION; INTRACAUDAL; PERINEURAL
Status: DISCONTINUED | OUTPATIENT
Start: 2021-07-28 | End: 2021-07-28 | Stop reason: HOSPADM

## 2021-07-28 RX ORDER — HYDROCODONE BITARTRATE AND ACETAMINOPHEN 5; 325 MG/1; MG/1
1 TABLET ORAL PRN
Status: DISCONTINUED | OUTPATIENT
Start: 2021-07-28 | End: 2021-07-28 | Stop reason: HOSPADM

## 2021-07-28 RX ORDER — HYDROCODONE BITARTRATE AND ACETAMINOPHEN 5; 325 MG/1; MG/1
2 TABLET ORAL PRN
Status: DISCONTINUED | OUTPATIENT
Start: 2021-07-28 | End: 2021-07-28 | Stop reason: HOSPADM

## 2021-07-28 RX ORDER — KETOROLAC TROMETHAMINE 30 MG/ML
30 INJECTION, SOLUTION INTRAMUSCULAR; INTRAVENOUS EVERY 6 HOURS
Status: DISCONTINUED | OUTPATIENT
Start: 2021-07-28 | End: 2021-07-28 | Stop reason: DRUGHIGH

## 2021-07-28 RX ORDER — IBUPROFEN 800 MG/1
800 TABLET ORAL EVERY 6 HOURS PRN
Qty: 60 TABLET | Refills: 0 | Status: ON HOLD | OUTPATIENT
Start: 2021-07-28 | End: 2022-01-16 | Stop reason: HOSPADM

## 2021-07-28 RX ORDER — KETOROLAC TROMETHAMINE 15 MG/ML
15 INJECTION, SOLUTION INTRAMUSCULAR; INTRAVENOUS EVERY 6 HOURS
Status: DISCONTINUED | OUTPATIENT
Start: 2021-07-28 | End: 2021-07-28 | Stop reason: HOSPADM

## 2021-07-28 RX ORDER — ACETAMINOPHEN 325 MG/1
650 TABLET ORAL EVERY 4 HOURS PRN
Status: DISCONTINUED | OUTPATIENT
Start: 2021-07-28 | End: 2021-07-28 | Stop reason: HOSPADM

## 2021-07-28 RX ORDER — SODIUM CHLORIDE 0.9 % (FLUSH) 0.9 %
5-40 SYRINGE (ML) INJECTION EVERY 12 HOURS SCHEDULED
Status: DISCONTINUED | OUTPATIENT
Start: 2021-07-28 | End: 2021-07-28 | Stop reason: HOSPADM

## 2021-07-28 RX ORDER — ONDANSETRON 2 MG/ML
4 INJECTION INTRAMUSCULAR; INTRAVENOUS EVERY 6 HOURS PRN
Status: DISCONTINUED | OUTPATIENT
Start: 2021-07-28 | End: 2021-07-28 | Stop reason: HOSPADM

## 2021-07-28 RX ORDER — SODIUM CHLORIDE 0.9 % (FLUSH) 0.9 %
10 SYRINGE (ML) INJECTION EVERY 12 HOURS SCHEDULED
Status: DISCONTINUED | OUTPATIENT
Start: 2021-07-28 | End: 2021-07-28 | Stop reason: HOSPADM

## 2021-07-28 RX ORDER — DIPHENHYDRAMINE HYDROCHLORIDE 50 MG/ML
12.5 INJECTION INTRAMUSCULAR; INTRAVENOUS
Status: DISCONTINUED | OUTPATIENT
Start: 2021-07-28 | End: 2021-07-28 | Stop reason: HOSPADM

## 2021-07-28 RX ORDER — SUCCINYLCHOLINE/SOD CL,ISO/PF 100 MG/5ML
SYRINGE (ML) INTRAVENOUS PRN
Status: DISCONTINUED | OUTPATIENT
Start: 2021-07-28 | End: 2021-07-28 | Stop reason: SDUPTHER

## 2021-07-28 RX ORDER — ACETAMINOPHEN 500 MG
1000 TABLET ORAL EVERY 6 HOURS PRN
Qty: 60 TABLET | Refills: 1 | Status: SHIPPED | OUTPATIENT
Start: 2021-07-28

## 2021-07-28 RX ORDER — SODIUM CHLORIDE 0.9 % (FLUSH) 0.9 %
5-40 SYRINGE (ML) INJECTION PRN
Status: DISCONTINUED | OUTPATIENT
Start: 2021-07-28 | End: 2021-07-28 | Stop reason: HOSPADM

## 2021-07-28 RX ORDER — OXYCODONE HYDROCHLORIDE AND ACETAMINOPHEN 5; 325 MG/1; MG/1
2 TABLET ORAL EVERY 4 HOURS PRN
Status: DISCONTINUED | OUTPATIENT
Start: 2021-07-28 | End: 2021-07-28 | Stop reason: HOSPADM

## 2021-07-28 RX ORDER — LIDOCAINE HYDROCHLORIDE 20 MG/ML
INJECTION, SOLUTION INTRAVENOUS PRN
Status: DISCONTINUED | OUTPATIENT
Start: 2021-07-28 | End: 2021-07-28 | Stop reason: SDUPTHER

## 2021-07-28 RX ORDER — SODIUM CHLORIDE, SODIUM LACTATE, POTASSIUM CHLORIDE, CALCIUM CHLORIDE 600; 310; 30; 20 MG/100ML; MG/100ML; MG/100ML; MG/100ML
INJECTION, SOLUTION INTRAVENOUS CONTINUOUS
Status: DISCONTINUED | OUTPATIENT
Start: 2021-07-28 | End: 2021-07-28 | Stop reason: HOSPADM

## 2021-07-28 RX ORDER — ONDANSETRON 4 MG/1
4 TABLET, ORALLY DISINTEGRATING ORAL EVERY 8 HOURS PRN
Status: DISCONTINUED | OUTPATIENT
Start: 2021-07-28 | End: 2021-07-28 | Stop reason: HOSPADM

## 2021-07-28 RX ORDER — LIDOCAINE HYDROCHLORIDE AND EPINEPHRINE 10; 10 MG/ML; UG/ML
INJECTION, SOLUTION INFILTRATION; PERINEURAL PRN
Status: DISCONTINUED | OUTPATIENT
Start: 2021-07-28 | End: 2021-07-28 | Stop reason: ALTCHOICE

## 2021-07-28 RX ORDER — ONDANSETRON 2 MG/ML
INJECTION INTRAMUSCULAR; INTRAVENOUS PRN
Status: DISCONTINUED | OUTPATIENT
Start: 2021-07-28 | End: 2021-07-28 | Stop reason: SDUPTHER

## 2021-07-28 RX ORDER — METOCLOPRAMIDE HYDROCHLORIDE 5 MG/ML
10 INJECTION INTRAMUSCULAR; INTRAVENOUS
Status: DISCONTINUED | OUTPATIENT
Start: 2021-07-28 | End: 2021-07-28 | Stop reason: HOSPADM

## 2021-07-28 RX ORDER — GLYCOPYRROLATE 1 MG/5 ML
SYRINGE (ML) INTRAVENOUS PRN
Status: DISCONTINUED | OUTPATIENT
Start: 2021-07-28 | End: 2021-07-28 | Stop reason: SDUPTHER

## 2021-07-28 RX ORDER — MIDAZOLAM HYDROCHLORIDE 2 MG/2ML
INJECTION, SOLUTION INTRAMUSCULAR; INTRAVENOUS PRN
Status: DISCONTINUED | OUTPATIENT
Start: 2021-07-28 | End: 2021-07-28 | Stop reason: SDUPTHER

## 2021-07-28 RX ORDER — OXYCODONE HYDROCHLORIDE AND ACETAMINOPHEN 5; 325 MG/1; MG/1
2 TABLET ORAL NIGHTLY PRN
Qty: 10 TABLET | Refills: 0 | Status: SHIPPED | OUTPATIENT
Start: 2021-07-28 | End: 2021-08-02

## 2021-07-28 RX ORDER — ONDANSETRON 2 MG/ML
4 INJECTION INTRAMUSCULAR; INTRAVENOUS
Status: DISCONTINUED | OUTPATIENT
Start: 2021-07-28 | End: 2021-07-28 | Stop reason: HOSPADM

## 2021-07-28 RX ADMIN — ONDANSETRON 4 MG: 2 INJECTION INTRAMUSCULAR; INTRAVENOUS at 07:53

## 2021-07-28 RX ADMIN — MIDAZOLAM HYDROCHLORIDE 2 MG: 1 INJECTION, SOLUTION INTRAMUSCULAR; INTRAVENOUS at 07:34

## 2021-07-28 RX ADMIN — FENTANYL CITRATE 50 MCG: 50 INJECTION, SOLUTION INTRAMUSCULAR; INTRAVENOUS at 08:23

## 2021-07-28 RX ADMIN — KETOROLAC TROMETHAMINE 15 MG: 15 INJECTION, SOLUTION INTRAMUSCULAR; INTRAVENOUS at 09:36

## 2021-07-28 RX ADMIN — SODIUM CHLORIDE, POTASSIUM CHLORIDE, SODIUM LACTATE AND CALCIUM CHLORIDE 1000 ML: 600; 310; 30; 20 INJECTION, SOLUTION INTRAVENOUS at 06:42

## 2021-07-28 RX ADMIN — CEFOXITIN SODIUM 2000 MG: 2 POWDER, FOR SOLUTION INTRAVENOUS at 07:51

## 2021-07-28 RX ADMIN — PROPOFOL 160 MG: 10 INJECTION, EMULSION INTRAVENOUS at 07:40

## 2021-07-28 RX ADMIN — FENTANYL CITRATE 50 MCG: 50 INJECTION, SOLUTION INTRAMUSCULAR; INTRAVENOUS at 07:40

## 2021-07-28 RX ADMIN — Medication 100 MG: at 07:40

## 2021-07-28 RX ADMIN — LIDOCAINE HYDROCHLORIDE 40 MG: 20 INJECTION, SOLUTION INTRAVENOUS at 07:40

## 2021-07-28 RX ADMIN — Medication 0.3 MG: at 08:12

## 2021-07-28 ASSESSMENT — PULMONARY FUNCTION TESTS
PIF_VALUE: 2
PIF_VALUE: 21
PIF_VALUE: 21
PIF_VALUE: 22
PIF_VALUE: 20
PIF_VALUE: 22
PIF_VALUE: 2
PIF_VALUE: 22
PIF_VALUE: 22
PIF_VALUE: 21
PIF_VALUE: 2
PIF_VALUE: 4
PIF_VALUE: 22
PIF_VALUE: 21
PIF_VALUE: 21
PIF_VALUE: 22
PIF_VALUE: 23
PIF_VALUE: 23
PIF_VALUE: 21
PIF_VALUE: 20
PIF_VALUE: 24
PIF_VALUE: 20
PIF_VALUE: 20
PIF_VALUE: 18
PIF_VALUE: 1
PIF_VALUE: 22
PIF_VALUE: 21
PIF_VALUE: 7
PIF_VALUE: 1
PIF_VALUE: 20
PIF_VALUE: 21
PIF_VALUE: 22
PIF_VALUE: 21
PIF_VALUE: 20
PIF_VALUE: 22
PIF_VALUE: 23
PIF_VALUE: 22
PIF_VALUE: 22
PIF_VALUE: 21
PIF_VALUE: 22
PIF_VALUE: 21
PIF_VALUE: 2
PIF_VALUE: 22
PIF_VALUE: 22
PIF_VALUE: 1
PIF_VALUE: 2
PIF_VALUE: 22
PIF_VALUE: 21
PIF_VALUE: 1
PIF_VALUE: 22
PIF_VALUE: 22
PIF_VALUE: 21
PIF_VALUE: 1
PIF_VALUE: 22
PIF_VALUE: 23
PIF_VALUE: 20
PIF_VALUE: 22
PIF_VALUE: 22
PIF_VALUE: 21
PIF_VALUE: 1
PIF_VALUE: 21
PIF_VALUE: 22
PIF_VALUE: 22
PIF_VALUE: 3
PIF_VALUE: 21
PIF_VALUE: 20
PIF_VALUE: 22
PIF_VALUE: 22
PIF_VALUE: 8
PIF_VALUE: 21
PIF_VALUE: 17
PIF_VALUE: 24

## 2021-07-28 NOTE — PROGRESS NOTES
The dose has been adjusted from Toradol 30 mg IV q6 hours x 8 doses to Toradol 15 mg IV q 6 hours based off of  recommendations for patients greater than 72years of age due to risks.

## 2021-07-28 NOTE — H&P
Dharmesh Moss is a 78 y.o. female who presents here today for complaints of successful PNE trial for refractory urge incontinence failed multiple medical treatments in the past..  Mentions PNE improved her symptoms up to 80 and 90% at some days. Patient has had the PNE trial for the past 2 weeks and mentions that the device was not working as well for the past 3 or 4 days. Patient was not able to get in to take out the electrodes a week ago.        Vitals:  /74   Wt 200 lb (90.7 kg)   BMI 34.33 kg/m²   Allergies:  Patient has no known allergies. Past Medical History        Past Medical History:   Diagnosis Date    High blood pressure      HIGH CHOLESTEROL      Overactive bladder           Past Surgical History         Past Surgical History:   Procedure Laterality Date    CHOLECYSTECTOMY        EYE SURGERY Bilateral       cataract    HYSTERECTOMY                      OB History         6    Para   4    Term                AB        Living            SAB        TAB        Ectopic        Molar        Multiple        Live Births                    Family History   No family history on file. Social History               Socioeconomic History    Marital status:        Spouse name: Not on file    Number of children: Not on file    Years of education: Not on file    Highest education level: Not on file   Occupational History    Not on file   Tobacco Use    Smoking status: Never Smoker    Smokeless tobacco: Never Used   Substance and Sexual Activity    Alcohol use:  Yes       Comment: occasionally    Drug use: No    Sexual activity: Not on file   Other Topics Concern    Not on file   Social History Narrative    Not on file      Social Determinants of Health          Financial Resource Strain:     Difficulty of Paying Living Expenses:    Food Insecurity:     Worried About Running Out of Food in the Last Year:     [address] in the Last Year:    Transportation Needs:     Lack of Transportation (Medical):  Lack of Transportation (Non-Medical):    Physical Activity:     Days of Exercise per Week:     Minutes of Exercise per Session:    Stress:     Feeling of Stress :    Social Connections:     Frequency of Communication with Friends and Family:     Frequency of Social Gatherings with Friends and Family:     Attends Sikh Services:     Active Member of Clubs or Organizations:     Attends Club or Organization Meetings:     Marital Status:    Intimate Partner Violence:     Fear of Current or Ex-Partner:     Emotionally Abused:     Physically Abused:     Sexually Abused:             Contraceptive method:  none     Patient's medications, allergies, past medical, surgical, social and family histories were reviewed and updated as appropriate.     Review of Systems  As per chief complaint   All other systems reviewed and are negative.     Physical Exam:  Vitals:  /74   Wt 200 lb (90.7 kg)   BMI 34.33 kg/m²   Lungs: CTAB   Heart : Regular S1/S2, no M/R/G  Abdomen: Soft , NT, ND , + BS   Pelvic exam : deferred     Assessment:        Diagnosis Orders   1. Urge urinary incontinence            Plan:      Successful PNE   scheduled for stage I and stage II InterStim  Sacral neuromodulation and PNE counseling   If your  symptoms have improved by at least 50 percent, you will be given the option to proceed with  InterStim placement. This procedure will be scheduled at the next available date convenient for you. InterStim Placement:  A permanent wire (lead) will be inserted and attached to a small neurostimulator battery that will be  implanted under the skin in the upper buttock. This will be done in the operating room as an  outpatient procedure with a local anesthetic and I.V. sedation. After the InterStim is implanted, you  will be given a patient  to adjust the stimulation from outside of the body.  InterStim  batteries last from three to six years.  You may need to be seen in the office occasionally to have your  InterStim reprogrammed based on changes in your urinary symptoms.           No orders of the defined types were placed in this encounter.       Encounter Medications    No orders of the defined types were placed in this encounter.         Follow Up:  No follow-ups on file.  Izzy Davis MD

## 2021-07-28 NOTE — ANESTHESIA POSTPROCEDURE EVALUATION
Department of Anesthesiology  Postprocedure Note    Patient: Conchis Duron  MRN: [id number]  YOB: 1940  Date of evaluation: 7/28/2021  Time:  8:58 AM     Procedure Summary     Date: 07/28/21 Room / Location: [address]    Anesthesia Start: 5344 Anesthesia Stop: 7971    Procedure: STAGE 1 & STAGE 2 INTERSTIM (N/A ) Diagnosis: (REFRACTORY URGE INCONTINENCE)    Surgeons: Bernadette Menchaca MD Responsible Provider: Norma Roger MD    Anesthesia Type: general ASA Status: 3          Anesthesia Type: general    Marshal Phase I:      Marshal Phase II:      Last vitals: Reviewed and per EMR flowsheets.        Anesthesia Post Evaluation    Patient location during evaluation: PACU  Patient participation: waiting for patient participation  Level of consciousness: sleepy but conscious  Airway patency: patent  Nausea & Vomiting: no nausea and no vomiting  Complications: no  Cardiovascular status: blood pressure returned to baseline and hemodynamically stable  Respiratory status: acceptable and nasal cannula  Hydration status: euvolemic

## 2021-07-28 NOTE — OP NOTE
Patient: Jason Hunter  YOB: 1940  MRN: [id number]    Date of Procedure: 7/28/2021    Pre-Op Diagnosis: REFRACTORY URGE INCONTINENCE     Post-Op Diagnosis: Same       Procedure(s):  STAGE 1 & STAGE 2 INTERSTIM    Surgeon(s):  Cyril Bojorquez MD    Assistant:  First Assistant: Yamile Miller    Anesthesia: General    Estimated Blood Loss (mL): Minimal    Complications: None    Specimens:   * No specimens in log *    Implants:  Implant Name Type Inv. Item Serial No.  Lot No. LRB No. Used Action   KIT LEAD SURE SCAN INTERSTIM MRI Spine:Stimulator KIT LEAD SURE SCAN INTERSTIM MRI  [employer] [place] [employer] [id number] Right 1 Implanted   GENERATOR NEUROSTIMULATOR H1.7XL2IN THK3IN TORQ [facility]  PROD - [id number]  GENERATOR NEUROSTIMULATOR H1.7XL2IN Shell[employer] [facility]  PROD [id number] [employer]WD  Right 1 Implanted   POUCH TYRX NEURO ANTIMICROBIAL MED Vascular/Graft/Patch/Filter POUCH TYRX NEURO ANTIMICROBIAL MED  [employer] [place] [employer]PMM [id number] Right 1 Implanted         Drains: * No LDAs found *    Findings: as above         Interstim 1 and 2      Description: Stage I and II neuromodulator.     PREOPERATIVE DIAGNOSIS: Refractory urgency and frequency with fecal incontinence.      POSTOPERATIVE DIAGNOSIS: Refractory urgency and frequency.     OPERATION: Stage I and II neuromodulator.     ANESTHESIA: General anaesthesia.     ESTIMATED BLOOD LOSS: Minimal.     FLUIDS: Crystalloid. The patient was given cefoxitin preop antibiotic.       The patient was tried on oxybutynin and mirabegron for over 1 month. The patient had pretty much failure from each of the procedure. The patient had less than 20% improvement with anticholinergics. Options such as continuously trying anticholinergics, continuation of the Kegel exercises, and trial of InterStim were discussed.  The patient was interested in the trial. The patient had percutaneous InterStim trial in the office with over 70% to 80% improvement in her urgency, frequency, and urge incontinence. The patient was significantly satisfied with the results and wanted to proceed with stage I and II neuromodulator. Risks of anesthesia, bleeding, infection, pain, MI, DVT, and PE were discussed. Risk of failure of the procedure in the future was discussed.     Risk of lead migration that the treatment may or may not work in the long-term basis and data on the long term were not clear were discussed with the patient. The patient understood and wanted to proceed with stage I and II neuromodulator. Consent was obtained.     DETAILS OF THE OPERATION: The patient was brought to the OR. The patient was placed in prone position. A pillow was placed underneath her pelvis area to slightly lift the pelvis up. The patient under general anesthesia through the IV, . The patient's back was prepped and draped in the usual sterile fashion. Under fluoroscopy, the needle placement on left side was confirmed. Also by the toe movement and anal sphincter contractions indicating the proper positioning of the needle. A wire was placed. The tract was dilated and lead was placed. The patient felt tapping in the vaginal area, which is an indication that the lead is in its proper position. Most of the leads had very low amplitude and stimulation. Lead was tunneled under the skin and was brought out through an incision on the left upper buttocks. Please note that the lidocaine was injected prior to the tunneling. A pouch was created about 1 cm beneath the subcutaneous tissue over the muscle where the actual unit was connected to the lead. Screws were turned and they were dropped. Attention was made to ensure that the lead was all the way in into the InterStim. Irrigation was performed Unit was then placed in the pouch . Impedance was checked. Irrigation was again performed with antibiotic irrigation solution. The needle site was closed using 4-0 Monocryl.  The pouch was closed using 4-0 Vicryl and the subcutaneous tissue with 2-0 Vicryl. Incision covered.     The patient was transferred to PACU in stable condition. Using the clinician , the INS was programmed. Rate: 14   Pulse Width: 210  Electrode Configuration: C2   Number of available programs: 7  Patient was provided utilization instructions for the patient  prior to discharge. You Kirkpatrick M.D., BARBI G

## 2021-07-28 NOTE — BRIEF OP NOTE
Brief Postoperative Note      Patient: Brody Khan  YOB: 1940  MRN: [id number]    Date of Procedure: 7/28/2021    Pre-Op Diagnosis: REFRACTORY URGE INCONTINENCE   Post-Op Diagnosis: Same       Procedure(s):  STAGE 1 & STAGE 2 INTERSTIM    Surgeon(s):  Blair Jimenez MD    Assistant:  First Assistant: Deondre Torres    Anesthesia: General    Estimated Blood Loss (mL): Minimal    Complications: None    Specimens:   * No specimens in log *    Implants:  Implant Name Type Inv.  Item Serial No.  Lot No. LRB No. Used Action   KIT LEAD SURE SCAN INTERSTIM MRI Spine:Stimulator KIT LEAD SURE SCAN INTERSTIM MRI  [employer] [place] [employer] [id number] Right 1 Implanted   GENERATOR NEUROSTIMULATOR H1.7XL2IN [facility]  PROD - [id number]  GENERATOR NEUROSTIMULATOR H1.7XL2IN [facility]  PROD [id number] [employer]  Right 1 Implanted   POUCH TYRX NEURO ANTIMICROBIAL MED Vascular/Graft/Patch/Filter POUCH TYRX NEURO ANTIMICROBIAL MED  [employer] [place] [employer] [id number] Right 1 Implanted         Drains: * No LDAs found *    Findings: as above     Electronically signed by Blair Jimenez MD on 7/28/2021 at 8:29 AM

## 2021-07-30 ENCOUNTER — TELEPHONE (OUTPATIENT)
Dept: OBGYN CLINIC | Age: 81
End: 2021-07-30

## 2021-08-16 ENCOUNTER — OFFICE VISIT (OUTPATIENT)
Dept: OBGYN CLINIC | Age: 81
End: 2021-08-16

## 2021-08-16 VITALS
BODY MASS INDEX: 35.61 KG/M2 | SYSTOLIC BLOOD PRESSURE: 138 MMHG | HEIGHT: 63 IN | DIASTOLIC BLOOD PRESSURE: 70 MMHG | WEIGHT: 201 LBS | HEART RATE: 72 BPM

## 2021-08-16 DIAGNOSIS — N39.41 URGE URINARY INCONTINENCE: ICD-10-CM

## 2021-08-16 DIAGNOSIS — Z09 POSTOP CHECK: Primary | ICD-10-CM

## 2021-08-16 PROBLEM — M79.89 OTHER SPECIFIED SOFT TISSUE DISORDERS: Status: ACTIVE | Noted: 2017-09-15

## 2021-08-16 PROBLEM — S46.009A INJURY OF TENDON OF ROTATOR CUFF: Status: ACTIVE | Noted: 2017-02-01

## 2021-08-16 PROBLEM — R29.898 DECREASED ROM OF NECK: Status: ACTIVE | Noted: 2020-03-06

## 2021-08-16 PROBLEM — H43.813 PVD (POSTERIOR VITREOUS DETACHMENT), BOTH EYES: Status: ACTIVE | Noted: 2017-04-03

## 2021-08-16 PROBLEM — R42 DIZZINESS AND GIDDINESS: Status: ACTIVE | Noted: 2019-07-23

## 2021-08-16 PROBLEM — H93.293 ABNORMAL AUDITORY PERCEPTION OF BOTH EARS: Status: ACTIVE | Noted: 2018-04-20

## 2021-08-16 PROBLEM — R60.0 EDEMA OF BOTH LEGS: Status: ACTIVE | Noted: 2021-08-16

## 2021-08-16 PROBLEM — R19.7 DIARRHEA: Status: ACTIVE | Noted: 2018-05-14

## 2021-08-16 PROBLEM — R26.89 IMBALANCE: Status: ACTIVE | Noted: 2019-07-02

## 2021-08-16 PROBLEM — I71.40 ABDOMINAL AORTIC ANEURYSM, WITHOUT RUPTURE: Status: ACTIVE | Noted: 2021-04-28

## 2021-08-16 PROBLEM — H02.403 PTOSIS OF BOTH EYELIDS: Status: ACTIVE | Noted: 2017-03-20

## 2021-08-16 PROBLEM — L03.90 CELLULITIS: Status: ACTIVE | Noted: 2021-08-16

## 2021-08-16 PROBLEM — H93.13 TINNITUS, BILATERAL: Status: ACTIVE | Noted: 2018-04-20

## 2021-08-16 PROCEDURE — 99024 POSTOP FOLLOW-UP VISIT: CPT | Performed by: OBSTETRICS & GYNECOLOGY

## 2021-08-16 RX ORDER — SULFAMETHOXAZOLE AND TRIMETHOPRIM 800; 160 MG/1; MG/1
1 TABLET ORAL 2 TIMES DAILY
Qty: 10 TABLET | Refills: 0 | Status: SHIPPED | OUTPATIENT
Start: 2021-08-16 | End: 2021-08-26

## 2021-08-16 NOTE — PROGRESS NOTES
Op Exam     Francisco Javier Chen is a 78y.o. year old female who presents to the office  2 weeks status post stage I and stage II InterStim for urge urinary incontinence. Bowel movements are Normal.  Thepatient is not having any pain. .  . The patient states she is reporting at least 50% improvement in symptoms and is fairly happy with post-procedure results. Patient has contacted [employer] last week and the device was calibrated to program #3 3 intensity and patient mentions that over the past week she had noticed considerable improvement after the changes in the device programming. Patient is still taking oxybutynin 15 mg XL p.o. daily as well. Patient did verbalize that she would be happier if she could even get more improvement regarding her urinary symptoms. Vitals:  /70 (Site: Left Upper Arm, Position: Sitting, Cuff Size: Medium Adult)   Pulse 72   Ht 5' 3\" (1.6 m)   Wt 201 lb (91.2 kg)   BMI 35.61 kg/m²   Allergies:  Patient has no known allergies. Past Medical History:   Diagnosis Date    High blood pressure     HIGH CHOLESTEROL     Overactive bladder     Thyroid disease      Past Surgical History:   Procedure Laterality Date    CHOLECYSTECTOMY      EYE SURGERY Bilateral     cataract    HYSTERECTOMY      NERVE SURGERY N/A 2021    STAGE 1 & STAGE 2 INTERSTIM performed by Orlando Duncan MD at [facility]     OB History        6    Para   4    Term                AB        Living           SAB        TAB        Ectopic        Molar        Multiple        Live Births                  No family history on file.   Social History     Socioeconomic History    Marital status:      Spouse name: Not on file    Number of children: Not on file    Years of education: Not on file    Highest education level: Not on file   Occupational History    Not on file   Tobacco Use    Smoking status: Never Smoker    Smokeless tobacco: Never Used   Substance and Sexual Activity    Alcohol use: Yes     Comment: occasionally    Drug use: No    Sexual activity: Not on file   Other Topics Concern    Not on file   Social History Narrative    Not on file     Social Determinants of Health     Financial Resource Strain:     Difficulty of Paying Living Expenses:    Food Insecurity:     Worried About Running Out of Food in the Last Year:     [address] in the Last Year:    Transportation Needs:     Lack of Transportation (Medical):  Lack of Transportation (Non-Medical):    Physical Activity:     Days of Exercise per Week:     Minutes of Exercise per Session:    Stress:     Feeling of Stress :    Social Connections:     Frequency of Communication with Friends and Family:     Frequency of Social Gatherings with Friends and Family:     Attends Judaism Services:     Active Member of Clubs or Organizations:     Attends Club or Organization Meetings:     Marital Status:    Intimate Partner Violence:     Fear of Current or Ex-Partner:     Emotionally Abused:     Physically Abused:     Sexually Abused:        Contraceptive method:  none    Patient's medications, allergies, past medical, surgical,social and family histories were reviewed and updated as appropriate. Review of Systems  Review of Systems   All other systems reviewed and are negative. Review of Systems  Constitutional: Negative for chills and fever. Respiratory: Negative for coughand shortness of breath. Cardiovascular: Negative for chestpain and palpitations. Gastrointestinal: Negative for nauseaand vomiting. Genitourinary: Negative for dysuria, frequencyand urgency. Musculoskeletal: Negative for myalgias. Neurological: Negative for dizziness, seizures andheadaches. Psychiatric/Behavioral: Negativefor depression and suicidal ideas. Exam  Physical Exam  Exam   Constitutional: Sheis well-developed, well-nourished, and in no distress. Cardiovascular: Normal rate and regularrhythm. Pulmonary/Chest: Effort normal and breath sounds normal.  Abdominal: Soft. Bowel sounds are normal.   Incision/s intact clean and dry. Healingadequately.  :   Genitourinary Comments: deferred  Buttock: Incision intact clean and dry on the right side    Assessment:     Patient state:  Doingwell postoperatively. Operative findings again reviewed. Diagnosis Orders   1. Postop check     2. Urge urinary incontinence          Pathology report discussed. Plan:     Significant improvement in urge urinary symptoms  I also increased the device intensity to 3.7 from 3 to try and achieve better control. Switch to oxybutynin 15 mg XL and prescribed Myrbetriq 50 daily try and see if that would also give some extra control as well. Course of Bactrim for 5 days for possible acute UTI , as well as mild tenderness around incision. Patient follow-up in 1 week  No orders of the defined types were placed in this encounter.     Orders Placed This Encounter   Medications    mirabegron (MYRBETRIQ) 50 MG TB24     Sig: Take 50 mg by mouth daily     Dispense:  30 tablet     Refill:  0    sulfamethoxazole-trimethoprim (BACTRIM DS;SEPTRA DS) 800-160 MG per tablet     Sig: Take 1 tablet by mouth 2 times daily for 10 days     Dispense:  10 tablet     Refill:  0       Martin Lombardi MD

## 2021-08-17 ENCOUNTER — OFFICE VISIT (OUTPATIENT)
Dept: NEUROLOGY | Age: 81
End: 2021-08-17
Payer: MEDICARE

## 2021-08-17 VITALS
BODY MASS INDEX: 35.85 KG/M2 | DIASTOLIC BLOOD PRESSURE: 70 MMHG | WEIGHT: 202.4 LBS | HEART RATE: 68 BPM | SYSTOLIC BLOOD PRESSURE: 104 MMHG

## 2021-08-17 DIAGNOSIS — H93.13 TINNITUS, BILATERAL: ICD-10-CM

## 2021-08-17 DIAGNOSIS — R42 LIGHTHEADED: ICD-10-CM

## 2021-08-17 DIAGNOSIS — H90.3 SENSORINEURAL HEARING LOSS OF BOTH EARS: ICD-10-CM

## 2021-08-17 DIAGNOSIS — R42 DIZZINESS: ICD-10-CM

## 2021-08-17 DIAGNOSIS — R42 ORTHOSTATIC DIZZINESS: Primary | ICD-10-CM

## 2021-08-17 PROCEDURE — 99204 OFFICE O/P NEW MOD 45 MIN: CPT | Performed by: PSYCHIATRY & NEUROLOGY

## 2021-08-17 RX ORDER — MECLIZINE HCL 12.5 MG/1
12.5 TABLET ORAL 2 TIMES DAILY
Qty: 20 TABLET | Refills: 0 | Status: SHIPPED | OUTPATIENT
Start: 2021-08-17 | End: 2021-08-27

## 2021-08-17 NOTE — PROGRESS NOTES
 Difficulty of Paying Living Expenses:    Food Insecurity:     Worried About Running Out of Food in the Last Year:     Ran Out of Food in the Last Year:    Transportation Needs:     Lack of Transportation (Medical):  Lack of Transportation (Non-Medical):    Physical Activity:     Days of Exercise per Week:     Minutes of Exercise per Session:    Stress:     Feeling of Stress :    Social Connections:     Frequency of Communication with Friends and Family:     Frequency of Social Gatherings with Friends and Family:     Attends Yazdanism Services:     Active Member of Clubs or Organizations:     Attends Club or Organization Meetings:     Marital Status:    Intimate Partner Violence:     Fear of Current or Ex-Partner:     Emotionally Abused:     Physically Abused:     Sexually Abused:      No family history on file.   No Known Allergies    Current Outpatient Medications   Medication Sig Dispense Refill    meclizine (ANTIVERT) 12.5 MG tablet Take 1 tablet by mouth 2 times daily for 10 days 20 tablet 0    sulfamethoxazole-trimethoprim (BACTRIM DS;SEPTRA DS) 800-160 MG per tablet Take 1 tablet by mouth 2 times daily for 10 days 10 tablet 0    ibuprofen (ADVIL;MOTRIN) 800 MG tablet Take 1 tablet by mouth every 6 hours as needed for Pain 60 tablet 0    acetaminophen (APAP EXTRA STRENGTH) 500 MG tablet Take 2 tablets by mouth every 6 hours as needed for Pain 60 tablet 1    cephALEXin (KEFLEX) 500 MG capsule Take 1 capsule by mouth 4 times daily 28 capsule 0    amLODIPine (NORVASC) 5 MG tablet TAKE 1 TABLET BY MOUTH DAILY      SYNTHROID 112 MCG tablet Take 112 mcg by mouth Daily       atorvastatin (LIPITOR) 20 MG tablet TAKE 1 TABLET BY MOUTH DAILY AT BEDTIME      oxybutynin (DITROPAN XL) 15 MG extended release tablet TAKE 1 TABLET DAILY 90 tablet 3    aspirin 81 MG chewable tablet Take 81 mg by mouth daily      Multiple Vitamins-Minerals (PRESERVISION AREDS) CAPS Take by mouth daily      Multiple Vitamins-Minerals (THERAPEUTIC MULTIVITAMIN-MINERALS) tablet Take 1 tablet by mouth daily.  losartan (COZAAR) 100 MG tablet Take 100 mg by mouth daily.  omeprazole (PRILOSEC OTC) 20 MG tablet Take 20 mg by mouth daily       Artificial Tear (GENTEAL) GEL Apply  to eye.  Cholecalciferol (D3-1000 PO) Take  by mouth.  mirabegron (MYRBETRIQ) 50 MG TB24 Take 50 mg by mouth daily (Patient not taking: Reported on 8/17/2021) 30 tablet 0    nitroGLYCERIN (NITROSTAT) 0.4 MG SL tablet Place 1 tablet under the tongue every 5 minutes as needed for Chest pain up to max of 3 total doses. If no relief after 1 dose, call 911. (Patient not taking: Reported on 8/17/2021) 25 tablet 3     No current facility-administered medications for this visit. Review of Systems   Constitutional: Negative for fever. HENT: Positive for hearing loss and tinnitus. Negative for ear pain and trouble swallowing. Eyes: Negative for photophobia and visual disturbance. Respiratory: Negative for choking and shortness of breath. Cardiovascular: Negative for chest pain and palpitations. Gastrointestinal: Negative for nausea and vomiting. Musculoskeletal: Positive for gait problem. Negative for back pain, joint swelling, myalgias, neck pain and neck stiffness. Skin: Negative for color change. Allergic/Immunologic: Negative for food allergies. Neurological: Positive for light-headedness. Negative for dizziness, tremors, seizures, syncope, facial asymmetry, speech difficulty, weakness, numbness and headaches. Psychiatric/Behavioral: Negative for behavioral problems, confusion, hallucinations and sleep disturbance. Objective:   /70 (Site: Left Upper Arm, Position: Standing, Cuff Size: Large Adult)   Pulse 68   Wt 202 lb 6.4 oz (91.8 kg)   BMI 35.85 kg/m²     Physical Exam  Vitals reviewed. Eyes:      Pupils: Pupils are equal, round, and reactive to light.    Cardiovascular:      Rate and Rhythm: Normal rate and regular rhythm. Heart sounds: No murmur heard. Pulmonary:      Effort: Pulmonary effort is normal.      Breath sounds: Normal breath sounds. Abdominal:      General: Bowel sounds are normal.   Musculoskeletal:         General: Normal range of motion. Cervical back: Normal range of motion. Skin:     General: Skin is warm. Neurological:      Mental Status: She is alert and oriented to person, place, and time. Cranial Nerves: No cranial nerve deficit. Sensory: No sensory deficit. Motor: No abnormal muscle tone. Coordination: Coordination normal.      Deep Tendon Reflexes: Reflexes are normal and symmetric. Babinski sign absent on the right side. Babinski sign absent on the left side. Psychiatric:         Mood and Affect: Mood normal.     Patient has absent ankle reflexes. Orthostatic blood pressure recordings are obtained supine blood pressure 140/60 with a pulse of 68 sitting blood pressure 126/76 with a pulse of 65 and standing blood pressure 104/70 with a pulse of 68 she is just partially symptomatic    No results found.     Lab Results   Component Value Date    WBC 7.1 07/21/2021    RBC 4.93 07/21/2021    RBC 4.01 01/13/2012    HGB 13.1 07/21/2021    HCT 40.8 07/21/2021    MCV 82.8 07/21/2021    MCH 26.5 07/21/2021    MCHC 32.1 07/21/2021    RDW 14.1 07/21/2021     07/21/2021    MPV 8.2 01/13/2012     Lab Results   Component Value Date     07/21/2021    K 4.0 07/21/2021     07/21/2021    CO2 24 07/21/2021    BUN 12 07/21/2021    CREATININE 0.46 07/21/2021    GFRAA >60.0 07/21/2021    LABGLOM >60.0 07/21/2021    GLUCOSE 88 07/21/2021    GLUCOSE 89 12/03/2019    PROT 6.6 01/21/2021    LABALBU 3.7 01/21/2021    LABALBU 4.4 12/15/2011    CALCIUM 8.9 07/21/2021    BILITOT 0.3 01/21/2021    ALKPHOS 91 01/21/2021    AST 15 01/21/2021    ALT 12 01/21/2021     Lab Results   Component Value Date    PROTIME 10.5 12/15/2011    INR 1.0 12/15/2011     Lab Results   Component Value Date    TSH 2.040 01/21/2021     Lab Results   Component Value Date    TRIG 168 01/21/2021    HDL 47 01/21/2021    LDLCALC 119 01/21/2021     No results found for: Marcine Moritz, LABBENZ, CANNAB, COCAINESCRN, LABMETH, OPIATESCREENURINE, PHENCYCLIDINESCREENURINE, PPXUR, ETOH  No results found for: LITHIUM, DILFRTOT, VALPROATE    Assessment:       Diagnosis Orders   1. Orthostatic dizziness     2. Tinnitus, bilateral     3. Sensorineural hearing loss of both ears     4. Lightheaded     5. Dizziness     Mild orthostatic dizziness with a systolic blood pressure fall of at least 40 points. That this may be causing some degree of sudden onset of loss of blood pressure. She is on 2 antihypertensive medications and therefore recommend that she monitor her blood pressure in the morning and at around 1 or 2:00 when the medication speak. She remains low then we may need to consider medication change. Most of the symptoms appears to be related to this orthostasis and I do not think that there is any findings of vertiginous dysfunction. We will do treat her with a very low-dose of Antivert to see if this helps and will continue to monitor. Patient had some testing done which we reviewed. We will keep an eye on this and continue to follow her      Plan:      No orders of the defined types were placed in this encounter. Orders Placed This Encounter   Medications    meclizine (ANTIVERT) 12.5 MG tablet     Sig: Take 1 tablet by mouth 2 times daily for 10 days     Dispense:  20 tablet     Refill:  0       No follow-ups on file.       Donny Roman MD

## 2021-08-18 ASSESSMENT — ENCOUNTER SYMPTOMS
PHOTOPHOBIA: 0
VOMITING: 0
TROUBLE SWALLOWING: 0
SHORTNESS OF BREATH: 0
NAUSEA: 0
CHOKING: 0
BACK PAIN: 0
COLOR CHANGE: 0

## 2021-09-01 ENCOUNTER — OFFICE VISIT (OUTPATIENT)
Dept: OBGYN CLINIC | Age: 81
End: 2021-09-01

## 2021-09-01 VITALS
WEIGHT: 203 LBS | HEART RATE: 68 BPM | HEIGHT: 63 IN | DIASTOLIC BLOOD PRESSURE: 84 MMHG | BODY MASS INDEX: 35.97 KG/M2 | SYSTOLIC BLOOD PRESSURE: 136 MMHG

## 2021-09-01 DIAGNOSIS — Z09 POSTOP CHECK: Primary | ICD-10-CM

## 2021-09-01 DIAGNOSIS — N39.41 URGE URINARY INCONTINENCE: ICD-10-CM

## 2021-09-01 PROCEDURE — 99024 POSTOP FOLLOW-UP VISIT: CPT | Performed by: OBSTETRICS & GYNECOLOGY

## 2021-09-08 ENCOUNTER — OFFICE VISIT (OUTPATIENT)
Dept: OBGYN CLINIC | Age: 81
End: 2021-09-08

## 2021-09-08 VITALS
HEIGHT: 63 IN | DIASTOLIC BLOOD PRESSURE: 64 MMHG | SYSTOLIC BLOOD PRESSURE: 138 MMHG | WEIGHT: 203 LBS | HEART RATE: 68 BPM | BODY MASS INDEX: 35.97 KG/M2

## 2021-09-08 DIAGNOSIS — N39.41 URGE URINARY INCONTINENCE: Primary | ICD-10-CM

## 2021-09-08 PROCEDURE — 99024 POSTOP FOLLOW-UP VISIT: CPT | Performed by: OBSTETRICS & GYNECOLOGY

## 2021-09-08 NOTE — PROGRESS NOTES
Op Exam     Machelle Altamirano is a 78y.o. year old female who presents to the office  5  weeks status post stage I and stage II InterStim for urge urinary incontinence. Bowel movements are Normal.  Thepatient is not having any pain. .  . The patient states she is reporting at least 50% improvement in symptoms and is fairly happy with post-procedure results. Patient has contacted [employer]on[employer] rep last week and the device was calibrated to program #5 3 intensity and patient mentions that over the past week she had noticed considerable improvement after the changes in the device programming, I increased intensity last visit to 3.7 , not much improvement from the 3 , here today for follow up. Patient is still taking oxybutynin 15 mg XL p.o. daily as well. Patient did verbalize that she would be happier if she could even get more improvement regarding her urinary symptoms. Vitals:  /64 (Site: Left Upper Arm, Position: Sitting, Cuff Size: Large Adult)   Pulse 68   Ht 5' 3\" (1.6 m)   Wt 203 lb (92.1 kg)   BMI 35.96 kg/m²   Allergies:  Patient has no known allergies. Past Medical History:   Diagnosis Date    High blood pressure     HIGH CHOLESTEROL     Overactive bladder     Thyroid disease      Past Surgical History:   Procedure Laterality Date    CHOLECYSTECTOMY      EYE SURGERY Bilateral     cataract    HYSTERECTOMY      NERVE SURGERY N/A 2021    STAGE 1 & STAGE 2 INTERSTIM performed by [facility]hadra [facility] at [facility]     OB History        6    Para   4    Term                AB        Living           SAB        TAB        Ectopic        Molar        Multiple        Live Births                  No family history on file.   Social History     Socioeconomic History    Marital status:      Spouse name: Not on file    Number of children: Not on file    Years of education: Not on file    Highest education level: Not on file   Occupational History    Not on file Tobacco Use    Smoking status: Never Smoker    Smokeless tobacco: Never Used   Substance and Sexual Activity    Alcohol use: Yes     Comment: occasionally    Drug use: No    Sexual activity: Not on file   Other Topics Concern    Not on file   Social History Narrative    Not on file     Social Determinants of Health     Financial Resource Strain:     Difficulty of Paying Living Expenses:    Food Insecurity:     Worried About Running Out of Food in the Last Year:     [address] in the Last Year:    Transportation Needs:     Lack of Transportation (Medical):  Lack of Transportation (Non-Medical):    Physical Activity:     Days of Exercise per Week:     Minutes of Exercise per Session:    Stress:     Feeling of Stress :    Social Connections:     Frequency of Communication with Friends and Family:     Frequency of Social Gatherings with Friends and Family:     Attends [employer] Services:     Active Member of Clubs or Organizations:     Attends Club or Organization Meetings:     Marital Status:    Intimate Partner Violence:     Fear of Current or Ex-Partner:     Emotionally Abused:     Physically Abused:     Sexually Abused:        Contraceptive method:  none    Patient's medications, allergies, past medical, surgical,social and family histories were reviewed and updated as appropriate. Review of Systems  Review of Systems   All other systems reviewed and are negative. Review of Systems  Constitutional: Negative for chills and fever. Respiratory: Negative for coughand shortness of breath. Cardiovascular: Negative for chestpain and palpitations. Gastrointestinal: Negative for nauseaand vomiting. Genitourinary: Negative for dysuria, frequencyand urgency. Musculoskeletal: Negative for myalgias. Neurological: Negative for dizziness, seizures andheadaches. Psychiatric/Behavioral: Negativefor depression and suicidal ideas.      Exam  Physical Exam  Exam   Constitutional: Sheis well-developed, well-nourished, and in no distress. Cardiovascular: Normal rate and regularrhythm. Pulmonary/Chest: Effort normal and breath sounds normal.  Abdominal: Soft. Bowel sounds are normal.   Incision/s intact clean and dry. Healingadequately.  :   Genitourinary Comments: deferred  Buttock: Incision intact clean and dry on the right side    Assessment:     Patient state:  Doingwell postoperatively. Operative findings again reviewed. Diagnosis Orders   1. Urge urinary incontinence          Pathology report discussed. Plan:     Significant improvement in urge urinary symptoms still  Last visit increased the device intensity to 3.7 from 3 on program # 5 to try and achieve better control. Not much improvement , this visit changed to program # 3 intensity at 5 . Continue oxybutynin 15 mg XL and prescribed Myrbetriq 50 daily try and see if that would also give some extra control as well but patient cannot get the mirabegron due to the price. Patient follow-up in 1 week    No orders of the defined types were placed in this encounter. No orders of the defined types were placed in this encounter.       Nadine New MD

## 2021-09-22 ENCOUNTER — OFFICE VISIT (OUTPATIENT)
Dept: OBGYN CLINIC | Age: 81
End: 2021-09-22

## 2021-09-22 VITALS
HEIGHT: 63 IN | WEIGHT: 203 LBS | HEART RATE: 64 BPM | SYSTOLIC BLOOD PRESSURE: 138 MMHG | BODY MASS INDEX: 35.97 KG/M2 | DIASTOLIC BLOOD PRESSURE: 62 MMHG

## 2021-09-22 DIAGNOSIS — N39.41 URGE URINARY INCONTINENCE: Primary | ICD-10-CM

## 2021-09-22 PROCEDURE — 99024 POSTOP FOLLOW-UP VISIT: CPT | Performed by: OBSTETRICS & GYNECOLOGY

## 2021-10-11 NOTE — PROGRESS NOTES
Mayra Zamudio is a 78 y.o. female who presents here today for complaints of Post-Op Check (Stage 1&2 21. She states she thinks sometimes it works but sometimes it doesnt.)      . Vitals:  /84 (Site: Right Upper Arm, Position: Sitting, Cuff Size: Medium Adult)   Pulse 68   Ht 5' 3\" (1.6 m)   Wt 203 lb (92.1 kg)   BMI 35.96 kg/m²   Allergies:  Patient has no known allergies. Past Medical History:   Diagnosis Date    High blood pressure     HIGH CHOLESTEROL     Overactive bladder     Thyroid disease      Past Surgical History:   Procedure Laterality Date    CHOLECYSTECTOMY      EYE SURGERY Bilateral     cataract    HYSTERECTOMY      NERVE SURGERY N/A 2021    STAGE 1 & STAGE 2 INTERSTIM performed by Yosef Sanchez [facility] at [facility]     OB History        6    Para   4    Term                AB        Living           SAB        TAB        Ectopic        Molar        Multiple        Live Births                  No family history on file. Social History     Socioeconomic History    Marital status:      Spouse name: Not on file    Number of children: Not on file    Years of education: Not on file    Highest education level: Not on file   Occupational History    Not on file   Tobacco Use    Smoking status: Never Smoker    Smokeless tobacco: Never Used   Substance and Sexual Activity    Alcohol use: Yes     Comment: occasionally    Drug use: No    Sexual activity: Not on file   Other Topics Concern    Not on file   Social History Narrative    Not on file     Social Determinants of Health     Financial Resource Strain:     Difficulty of Paying Living Expenses:    Food Insecurity:     Worried About Running Out of Food in the Last Year:     [address] in the Last Year:    Transportation Needs:     Lack of Transportation (Medical):      Lack of Transportation (Non-Medical):    Physical Activity:     Days of Exercise per Week:     Minutes of Exercise per Session:    Stress:     Feeling of Stress :    Social Connections:     Frequency of Communication with Friends and Family:     Frequency of Social Gatherings with Friends and Family:     Attends Jainism Services:     Active Member of Clubs or Organizations:     Attends Club or Organization Meetings:     Marital Status:    Intimate Partner Violence:     Fear of Current or Ex-Partner:     Emotionally Abused:     Physically Abused:     Sexually Abused:        Contraceptive method:  none    Patient's medications, allergies, past medical, surgical, social and family histories were reviewed and updated as appropriate. Review of Systems  As per chief complaint   All other systems reviewed and are negative. urgency, frequency, incontinence, nocturia    Physical Exam:  Vitals:  /84 (Site: Right Upper Arm, Position: Sitting, Cuff Size: Medium Adult)   Pulse 68   Ht 5' 3\" (1.6 m)   Wt 203 lb (92.1 kg)   BMI 35.96 kg/m²   Lungs: CTAB   Heart : Regular S1/S2, no M/R/G  Abdomen: Soft , NT, ND , + BS   Pelvic exam : deferred  Buttock exam : interstim insertion site fully healed. Assessment:      Diagnosis Orders   1. Postop check     2. Urge urinary incontinence         Plan: Will schedule appointment with [employer] rep . For device calibration after several attempts done by myself on previous visits    meds tried not effective in helping control symptoms    Patient states that her urge symptoms are still > 50 % better than prior to procedure , but wishes she can get even better control if possible. No orders of the defined types were placed in this encounter. No orders of the defined types were placed in this encounter. Follow Up:  Return in about 2 weeks (around 9/15/2021) for schedule with [employer] rep Tia Garrison MD

## 2021-10-18 NOTE — PROGRESS NOTES
Op Exam     Lilliam Duty is a 78y.o. year old female who presents to the office  2  weeks status post stage I and stage II InterStim for urge urinary incontinence. Bowel movements are Normal.  Thepatient is not having any pain. .  . The patient states she is reporting at least 50% improvement in symptoms and is fairly happy with post-procedure results. Patient has contacted Grace Mejiaor the Fluid-1 rep and device was calibrated , not much improvement , here today for follow up. Patient is still taking oxybutynin 15 mg XL p.o. daily as well. Patient did verbalize that she would be happier if she could even get more improvement regarding her urinary symptoms. Vitals:  /62 (Site: Left Upper Arm, Position: Sitting, Cuff Size: Large Adult)   Pulse 64   Ht 5' 3\" (1.6 m)   Wt 203 lb (92.1 kg)   BMI 35.96 kg/m²   Allergies:  Patient has no known allergies. Past Medical History:   Diagnosis Date    High blood pressure     HIGH CHOLESTEROL     Overactive bladder     Thyroid disease      Past Surgical History:   Procedure Laterality Date    CHOLECYSTECTOMY      EYE SURGERY Bilateral     cataract    HYSTERECTOMY      NERVE SURGERY N/A 2021    STAGE 1 & STAGE 2 INTERSTIM performed by Nolan Collins MD at [facility]     OB History        6    Para   4    Term                AB        Living           SAB        TAB        Ectopic        Molar        Multiple        Live Births                  No family history on file.   Social History     Socioeconomic History    Marital status:      Spouse name: Not on file    Number of children: Not on file    Years of education: Not on file    Highest education level: Not on file   Occupational History    Not on file   Tobacco Use    Smoking status: Never Smoker    Smokeless tobacco: Never Used   Substance and Sexual Activity    Alcohol use: Yes     Comment: occasionally    Drug use: No    Sexual activity: Not on file   Other Topics Concern    Not on file   Social History Narrative    Not on file     Social Determinants of Health     Financial Resource Strain:     Difficulty of Paying Living Expenses:    Food Insecurity:     Worried About Running Out of Food in the Last Year:     [address] in the Last Year:    Transportation Needs:     Lack of Transportation (Medical):  Lack of Transportation (Non-Medical):    Physical Activity:     Days of Exercise per Week:     Minutes of Exercise per Session:    Stress:     Feeling of Stress :    Social Connections:     Frequency of Communication with Friends and Family:     Frequency of Social Gatherings with Friends and Family:     Attends Islam Services:     Active Member of Clubs or Organizations:     Attends Club or Organization Meetings:     Marital Status:    Intimate Partner Violence:     Fear of Current or Ex-Partner:     Emotionally Abused:     Physically Abused:     Sexually Abused:        Contraceptive method:  none    Patient's medications, allergies, past medical, surgical,social and family histories were reviewed and updated as appropriate. Review of Systems  Review of Systems   All other systems reviewed and are negative. Review of Systems  Constitutional: Negative for chills and fever. Respiratory: Negative for coughand shortness of breath. Cardiovascular: Negative for chestpain and palpitations. Gastrointestinal: Negative for nauseaand vomiting. Genitourinary: Negative for dysuria, frequencyand urgency. Musculoskeletal: Negative for myalgias. Neurological: Negative for dizziness, seizures andheadaches. Psychiatric/Behavioral: Negativefor depression and suicidal ideas. Exam  Physical Exam  Exam   Constitutional: Sheis well-developed, well-nourished, and in no distress. Cardiovascular: Normal rate and regularrhythm. Pulmonary/Chest: Effort normal and breath sounds normal.  Abdominal: Soft.  Bowel sounds are normal.   Incision/s intact clean and dry. Healingadequately.  :   Genitourinary Comments: deferred  Buttock: Incision intact clean and dry on the right side    Assessment:     Patient state:  Doingwell postoperatively. Operative findings again reviewed. Diagnosis Orders   1. Urge urinary incontinence          Pathology report discussed. Plan:     Significant improvement in urge urinary symptoms still    Continue oxybutynin 15 mg XL , discontinue the added mirabegron since no improvement with combination. Patient to be scheduled to see Emilia Corcoran from [facility] for further calibration of the interstim. No orders of the defined types were placed in this encounter. No orders of the defined types were placed in this encounter.       Alana Castaneda MD

## 2022-01-08 ENCOUNTER — APPOINTMENT (OUTPATIENT)
Dept: GENERAL RADIOLOGY | Age: 82
DRG: 242 | End: 2022-01-08
Payer: MEDICARE

## 2022-01-08 ENCOUNTER — APPOINTMENT (OUTPATIENT)
Dept: CT IMAGING | Age: 82
DRG: 242 | End: 2022-01-08
Payer: MEDICARE

## 2022-01-08 ENCOUNTER — HOSPITAL ENCOUNTER (INPATIENT)
Age: 82
LOS: 8 days | Discharge: NURSING, REHAB OR LONG-TERM CARE | DRG: 242 | End: 2022-01-16
Attending: FAMILY MEDICINE | Admitting: INTERNAL MEDICINE
Payer: MEDICARE

## 2022-01-08 DIAGNOSIS — E86.0 DEHYDRATION: Primary | ICD-10-CM

## 2022-01-08 DIAGNOSIS — S82.891A CLOSED FRACTURE OF RIGHT ANKLE, INITIAL ENCOUNTER: ICD-10-CM

## 2022-01-08 DIAGNOSIS — I49.5 SICK SINUS SYNDROME (HCC): ICD-10-CM

## 2022-01-08 DIAGNOSIS — R00.1 SYMPTOMATIC BRADYCARDIA: ICD-10-CM

## 2022-01-08 DIAGNOSIS — U07.1 COVID-19 VIRUS INFECTION: ICD-10-CM

## 2022-01-08 LAB
ALBUMIN SERPL-MCNC: 3.8 G/DL (ref 3.5–4.6)
ALP BLD-CCNC: 98 U/L (ref 40–130)
ALT SERPL-CCNC: 15 U/L (ref 0–33)
ANION GAP SERPL CALCULATED.3IONS-SCNC: 15 MEQ/L (ref 9–15)
AST SERPL-CCNC: 16 U/L (ref 0–35)
BASOPHILS ABSOLUTE: 0.1 K/UL (ref 0–0.2)
BASOPHILS RELATIVE PERCENT: 0.3 %
BILIRUB SERPL-MCNC: 0.6 MG/DL (ref 0.2–0.7)
BILIRUBIN URINE: NEGATIVE
BLOOD, URINE: NEGATIVE
BUN BLDV-MCNC: 9 MG/DL (ref 8–23)
CALCIUM SERPL-MCNC: 9.4 MG/DL (ref 8.5–9.9)
CHLORIDE BLD-SCNC: 99 MEQ/L (ref 95–107)
CLARITY: CLEAR
CO2: 23 MEQ/L (ref 20–31)
COLOR: YELLOW
CREAT SERPL-MCNC: 0.47 MG/DL (ref 0.5–0.9)
EOSINOPHILS ABSOLUTE: 0 K/UL (ref 0–0.7)
EOSINOPHILS RELATIVE PERCENT: 0 %
GFR AFRICAN AMERICAN: >60
GFR NON-AFRICAN AMERICAN: >60
GLOBULIN: 3.1 G/DL (ref 2.3–3.5)
GLUCOSE BLD-MCNC: 172 MG/DL (ref 70–99)
GLUCOSE URINE: NEGATIVE MG/DL
HCT VFR BLD CALC: 41.9 % (ref 37–47)
HEMOGLOBIN: 13.6 G/DL (ref 12–16)
KETONES, URINE: 15 MG/DL
LEUKOCYTE ESTERASE, URINE: NEGATIVE
LYMPHOCYTES ABSOLUTE: 1.8 K/UL (ref 1–4.8)
LYMPHOCYTES RELATIVE PERCENT: 9.2 %
MCH RBC QN AUTO: 26.7 PG (ref 27–31.3)
MCHC RBC AUTO-ENTMCNC: 32.5 % (ref 33–37)
MCV RBC AUTO: 82 FL (ref 82–100)
MONOCYTES ABSOLUTE: 1.1 K/UL (ref 0.2–0.8)
MONOCYTES RELATIVE PERCENT: 5.9 %
NEUTROPHILS ABSOLUTE: 16.4 K/UL (ref 1.4–6.5)
NEUTROPHILS RELATIVE PERCENT: 84.6 %
NITRITE, URINE: NEGATIVE
PDW BLD-RTO: 14.2 % (ref 11.5–14.5)
PH UA: 6.5 (ref 5–9)
PLATELET # BLD: 342 K/UL (ref 130–400)
POTASSIUM SERPL-SCNC: 3.2 MEQ/L (ref 3.4–4.9)
PRO-BNP: 459 PG/ML
PROCALCITONIN: 0.05 NG/ML (ref 0–0.15)
PROTEIN UA: ABNORMAL MG/DL
RBC # BLD: 5.1 M/UL (ref 4.2–5.4)
SARS-COV-2, NAAT: DETECTED
SODIUM BLD-SCNC: 137 MEQ/L (ref 135–144)
SPECIFIC GRAVITY UA: 1.01 (ref 1–1.03)
TOTAL PROTEIN: 6.9 G/DL (ref 6.3–8)
TROPONIN: <0.01 NG/ML (ref 0–0.01)
TSH SERPL DL<=0.05 MIU/L-ACNC: 1.05 UIU/ML (ref 0.44–3.86)
URINE REFLEX TO CULTURE: ABNORMAL
UROBILINOGEN, URINE: 0.2 E.U./DL
WBC # BLD: 19.4 K/UL (ref 4.8–10.8)

## 2022-01-08 PROCEDURE — 96375 TX/PRO/DX INJ NEW DRUG ADDON: CPT

## 2022-01-08 PROCEDURE — 6360000002 HC RX W HCPCS: Performed by: INTERNAL MEDICINE

## 2022-01-08 PROCEDURE — 93005 ELECTROCARDIOGRAM TRACING: CPT | Performed by: FAMILY MEDICINE

## 2022-01-08 PROCEDURE — 2580000003 HC RX 258: Performed by: INTERNAL MEDICINE

## 2022-01-08 PROCEDURE — 96376 TX/PRO/DX INJ SAME DRUG ADON: CPT

## 2022-01-08 PROCEDURE — 99285 EMERGENCY DEPT VISIT HI MDM: CPT

## 2022-01-08 PROCEDURE — 81003 URINALYSIS AUTO W/O SCOPE: CPT

## 2022-01-08 PROCEDURE — 6370000000 HC RX 637 (ALT 250 FOR IP): Performed by: FAMILY MEDICINE

## 2022-01-08 PROCEDURE — 70450 CT HEAD/BRAIN W/O DYE: CPT

## 2022-01-08 PROCEDURE — 96374 THER/PROPH/DIAG INJ IV PUSH: CPT

## 2022-01-08 PROCEDURE — 83880 ASSAY OF NATRIURETIC PEPTIDE: CPT

## 2022-01-08 PROCEDURE — 85025 COMPLETE CBC W/AUTO DIFF WBC: CPT

## 2022-01-08 PROCEDURE — C1725 CATH, TRANSLUMIN NON-LASER: HCPCS

## 2022-01-08 PROCEDURE — 84443 ASSAY THYROID STIM HORMONE: CPT

## 2022-01-08 PROCEDURE — 87635 SARS-COV-2 COVID-19 AMP PRB: CPT

## 2022-01-08 PROCEDURE — 5A1223Z PERFORMANCE OF CARDIAC PACING, CONTINUOUS: ICD-10-PCS | Performed by: INTERNAL MEDICINE

## 2022-01-08 PROCEDURE — 36415 COLL VENOUS BLD VENIPUNCTURE: CPT

## 2022-01-08 PROCEDURE — 2000000000 HC ICU R&B

## 2022-01-08 PROCEDURE — 33210 INSERT ELECTRD/PM CATH SNGL: CPT | Performed by: INTERNAL MEDICINE

## 2022-01-08 PROCEDURE — 84145 PROCALCITONIN (PCT): CPT

## 2022-01-08 PROCEDURE — 71045 X-RAY EXAM CHEST 1 VIEW: CPT

## 2022-01-08 PROCEDURE — 33210 INSERT ELECTRD/PM CATH SNGL: CPT

## 2022-01-08 PROCEDURE — 73610 X-RAY EXAM OF ANKLE: CPT

## 2022-01-08 PROCEDURE — 2580000003 HC RX 258: Performed by: FAMILY MEDICINE

## 2022-01-08 PROCEDURE — 84484 ASSAY OF TROPONIN QUANT: CPT

## 2022-01-08 PROCEDURE — 80053 COMPREHEN METABOLIC PANEL: CPT

## 2022-01-08 PROCEDURE — 92953 TEMPORARY EXTERNAL PACING: CPT

## 2022-01-08 PROCEDURE — C1894 INTRO/SHEATH, NON-LASER: HCPCS

## 2022-01-08 PROCEDURE — 96361 HYDRATE IV INFUSION ADD-ON: CPT

## 2022-01-08 PROCEDURE — C9113 INJ PANTOPRAZOLE SODIUM, VIA: HCPCS | Performed by: FAMILY MEDICINE

## 2022-01-08 PROCEDURE — 6360000002 HC RX W HCPCS: Performed by: FAMILY MEDICINE

## 2022-01-08 PROCEDURE — 6360000002 HC RX W HCPCS

## 2022-01-08 RX ORDER — MORPHINE SULFATE 4 MG/ML
4 INJECTION, SOLUTION INTRAMUSCULAR; INTRAVENOUS
Status: DISCONTINUED | OUTPATIENT
Start: 2022-01-08 | End: 2022-01-16 | Stop reason: HOSPADM

## 2022-01-08 RX ORDER — POTASSIUM CHLORIDE 20 MEQ/1
40 TABLET, EXTENDED RELEASE ORAL ONCE
Status: COMPLETED | OUTPATIENT
Start: 2022-01-08 | End: 2022-01-09

## 2022-01-08 RX ORDER — ACETAMINOPHEN 500 MG
1000 TABLET ORAL EVERY 6 HOURS PRN
Status: DISCONTINUED | OUTPATIENT
Start: 2022-01-08 | End: 2022-01-16 | Stop reason: HOSPADM

## 2022-01-08 RX ORDER — ONDANSETRON HYDROCHLORIDE 4 MG/5ML
4 SOLUTION ORAL ONCE
Status: COMPLETED | OUTPATIENT
Start: 2022-01-08 | End: 2022-01-08

## 2022-01-08 RX ORDER — SODIUM CHLORIDE 450 MG/100ML
INJECTION, SOLUTION INTRAVENOUS CONTINUOUS
Status: DISCONTINUED | OUTPATIENT
Start: 2022-01-08 | End: 2022-01-09

## 2022-01-08 RX ORDER — ONDANSETRON 2 MG/ML
4 INJECTION INTRAMUSCULAR; INTRAVENOUS EVERY 6 HOURS PRN
Status: DISCONTINUED | OUTPATIENT
Start: 2022-01-08 | End: 2022-01-16 | Stop reason: HOSPADM

## 2022-01-08 RX ORDER — ONDANSETRON 2 MG/ML
4 INJECTION INTRAMUSCULAR; INTRAVENOUS ONCE
Status: COMPLETED | OUTPATIENT
Start: 2022-01-08 | End: 2022-01-08

## 2022-01-08 RX ORDER — ONDANSETRON 2 MG/ML
INJECTION INTRAMUSCULAR; INTRAVENOUS
Status: COMPLETED
Start: 2022-01-08 | End: 2022-01-08

## 2022-01-08 RX ORDER — AMLODIPINE BESYLATE 5 MG/1
5 TABLET ORAL DAILY
Status: DISCONTINUED | OUTPATIENT
Start: 2022-01-08 | End: 2022-01-16 | Stop reason: HOSPADM

## 2022-01-08 RX ORDER — MAGNESIUM SULFATE IN WATER 40 MG/ML
2000 INJECTION, SOLUTION INTRAVENOUS ONCE
Status: COMPLETED | OUTPATIENT
Start: 2022-01-08 | End: 2022-01-08

## 2022-01-08 RX ORDER — LOSARTAN POTASSIUM 100 MG/1
100 TABLET ORAL DAILY
Status: DISCONTINUED | OUTPATIENT
Start: 2022-01-08 | End: 2022-01-10

## 2022-01-08 RX ORDER — METOCLOPRAMIDE HYDROCHLORIDE 5 MG/ML
5 INJECTION INTRAMUSCULAR; INTRAVENOUS ONCE
Status: COMPLETED | OUTPATIENT
Start: 2022-01-08 | End: 2022-01-08

## 2022-01-08 RX ORDER — PANTOPRAZOLE SODIUM 40 MG/10ML
40 INJECTION, POWDER, LYOPHILIZED, FOR SOLUTION INTRAVENOUS DAILY
Status: DISCONTINUED | OUTPATIENT
Start: 2022-01-08 | End: 2022-01-08 | Stop reason: SDUPTHER

## 2022-01-08 RX ORDER — LEVOTHYROXINE SODIUM 112 UG/1
112 TABLET ORAL DAILY
Status: DISCONTINUED | OUTPATIENT
Start: 2022-01-08 | End: 2022-01-16 | Stop reason: HOSPADM

## 2022-01-08 RX ORDER — SODIUM CHLORIDE 0.9 % (FLUSH) 0.9 %
5-40 SYRINGE (ML) INJECTION EVERY 12 HOURS SCHEDULED
Status: DISCONTINUED | OUTPATIENT
Start: 2022-01-08 | End: 2022-01-16

## 2022-01-08 RX ORDER — 0.9 % SODIUM CHLORIDE 0.9 %
1000 INTRAVENOUS SOLUTION INTRAVENOUS ONCE
Status: COMPLETED | OUTPATIENT
Start: 2022-01-08 | End: 2022-01-08

## 2022-01-08 RX ORDER — POTASSIUM CHLORIDE 7.45 MG/ML
10 INJECTION INTRAVENOUS
Status: DISPENSED | OUTPATIENT
Start: 2022-01-08 | End: 2022-01-08

## 2022-01-08 RX ORDER — OXYBUTYNIN CHLORIDE 5 MG/1
15 TABLET, EXTENDED RELEASE ORAL DAILY
Status: DISCONTINUED | OUTPATIENT
Start: 2022-01-08 | End: 2022-01-12

## 2022-01-08 RX ORDER — SODIUM CHLORIDE 0.9 % (FLUSH) 0.9 %
5-40 SYRINGE (ML) INJECTION PRN
Status: DISCONTINUED | OUTPATIENT
Start: 2022-01-08 | End: 2022-01-16 | Stop reason: HOSPADM

## 2022-01-08 RX ORDER — SODIUM CHLORIDE 9 MG/ML
10 INJECTION INTRAVENOUS DAILY
Status: DISCONTINUED | OUTPATIENT
Start: 2022-01-08 | End: 2022-01-08 | Stop reason: SDUPTHER

## 2022-01-08 RX ORDER — PANTOPRAZOLE SODIUM 40 MG/1
40 TABLET, DELAYED RELEASE ORAL
Status: DISCONTINUED | OUTPATIENT
Start: 2022-01-09 | End: 2022-01-16 | Stop reason: HOSPADM

## 2022-01-08 RX ORDER — ATROPINE SULFATE 0.1 MG/ML
INJECTION INTRAVENOUS
Status: DISPENSED
Start: 2022-01-08 | End: 2022-01-09

## 2022-01-08 RX ORDER — HYDRALAZINE HYDROCHLORIDE 20 MG/ML
10 INJECTION INTRAMUSCULAR; INTRAVENOUS EVERY 4 HOURS PRN
Status: DISCONTINUED | OUTPATIENT
Start: 2022-01-08 | End: 2022-01-16 | Stop reason: HOSPADM

## 2022-01-08 RX ORDER — SODIUM CHLORIDE 9 MG/ML
25 INJECTION, SOLUTION INTRAVENOUS PRN
Status: DISCONTINUED | OUTPATIENT
Start: 2022-01-08 | End: 2022-01-16 | Stop reason: HOSPADM

## 2022-01-08 RX ORDER — DOPAMINE HYDROCHLORIDE 160 MG/100ML
2.5 INJECTION, SOLUTION INTRAVENOUS CONTINUOUS
Status: DISCONTINUED | OUTPATIENT
Start: 2022-01-08 | End: 2022-01-11

## 2022-01-08 RX ORDER — MORPHINE SULFATE 2 MG/ML
2 INJECTION, SOLUTION INTRAMUSCULAR; INTRAVENOUS
Status: DISCONTINUED | OUTPATIENT
Start: 2022-01-08 | End: 2022-01-16 | Stop reason: HOSPADM

## 2022-01-08 RX ADMIN — SODIUM CHLORIDE 1000 ML: 9 INJECTION, SOLUTION INTRAVENOUS at 11:04

## 2022-01-08 RX ADMIN — POTASSIUM CHLORIDE 10 MEQ: 10 INJECTION, SOLUTION INTRAVENOUS at 17:07

## 2022-01-08 RX ADMIN — POTASSIUM CHLORIDE 10 MEQ: 10 INJECTION, SOLUTION INTRAVENOUS at 18:13

## 2022-01-08 RX ADMIN — MORPHINE SULFATE 2 MG: 2 INJECTION, SOLUTION INTRAMUSCULAR; INTRAVENOUS at 22:46

## 2022-01-08 RX ADMIN — Medication 10 ML: at 22:47

## 2022-01-08 RX ADMIN — ONDANSETRON 4 MG: 2 INJECTION INTRAMUSCULAR; INTRAVENOUS at 11:04

## 2022-01-08 RX ADMIN — SODIUM CHLORIDE: 4.5 INJECTION, SOLUTION INTRAVENOUS at 14:25

## 2022-01-08 RX ADMIN — ONDANSETRON 4 MG: 4 SOLUTION ORAL at 19:51

## 2022-01-08 RX ADMIN — METOCLOPRAMIDE HYDROCHLORIDE 5 MG: 5 INJECTION INTRAMUSCULAR; INTRAVENOUS at 14:25

## 2022-01-08 RX ADMIN — ONDANSETRON 4 MG: 2 INJECTION INTRAMUSCULAR; INTRAVENOUS at 12:14

## 2022-01-08 RX ADMIN — PANTOPRAZOLE SODIUM 40 MG: 40 INJECTION, POWDER, FOR SOLUTION INTRAVENOUS at 11:29

## 2022-01-08 RX ADMIN — SODIUM CHLORIDE, PRESERVATIVE FREE 10 ML: 5 INJECTION INTRAVENOUS at 11:29

## 2022-01-08 RX ADMIN — POTASSIUM CHLORIDE 10 MEQ: 10 INJECTION, SOLUTION INTRAVENOUS at 15:58

## 2022-01-08 RX ADMIN — MAGNESIUM SULFATE HEPTAHYDRATE 2000 MG: 40 INJECTION, SOLUTION INTRAVENOUS at 14:26

## 2022-01-08 RX ADMIN — HYDRALAZINE HYDROCHLORIDE 10 MG: 20 INJECTION INTRAMUSCULAR; INTRAVENOUS at 23:56

## 2022-01-08 RX ADMIN — DOPAMINE HYDROCHLORIDE 2.5 MCG/KG/MIN: 160 INJECTION, SOLUTION INTRAVENOUS at 14:25

## 2022-01-08 ASSESSMENT — PAIN SCALES - GENERAL
PAINLEVEL_OUTOF10: 10
PAINLEVEL_OUTOF10: 6
PAINLEVEL_OUTOF10: 10

## 2022-01-08 ASSESSMENT — ENCOUNTER SYMPTOMS
RHINORRHEA: 1
SHORTNESS OF BREATH: 1
COUGH: 1

## 2022-01-08 ASSESSMENT — PAIN DESCRIPTION - ONSET: ONSET: ON-GOING

## 2022-01-08 ASSESSMENT — PAIN DESCRIPTION - ORIENTATION: ORIENTATION: RIGHT

## 2022-01-08 ASSESSMENT — PAIN DESCRIPTION - PAIN TYPE: TYPE: OTHER (COMMENT)

## 2022-01-08 ASSESSMENT — PAIN DESCRIPTION - PROGRESSION: CLINICAL_PROGRESSION: NOT CHANGED

## 2022-01-08 ASSESSMENT — PAIN - FUNCTIONAL ASSESSMENT: PAIN_FUNCTIONAL_ASSESSMENT: PREVENTS OR INTERFERES WITH ALL ACTIVE AND SOME PASSIVE ACTIVITIES

## 2022-01-08 ASSESSMENT — PAIN DESCRIPTION - LOCATION: LOCATION: ANKLE

## 2022-01-08 NOTE — ED NOTES
Due to recurrent episodes of cardiac pauses, LifePak hands free pads applied to patient. Atropine at bedside per Dr Ramirez verbal order.      Wolfgang Boucher RN  01/08/22 1789

## 2022-01-08 NOTE — PROGRESS NOTES
Hospitalist Progress Note      Date of Admission: 1/8/2022  Chief Complaint:    Chief Complaint   Patient presents with    Dizziness    Emesis     Subjective:  No new complaints. No nausea, vomiting, chest pain, or headache      Medications:    Infusion Medications    DOPamine 2.5 mcg/kg/min (01/08/22 1425)    sodium chloride 100 mL/hr at 01/08/22 1425     Scheduled Medications    atropine        amLODIPine  5 mg Oral Daily    losartan  100 mg Oral Daily    [START ON 1/9/2022] pantoprazole  40 mg Oral QAM AC    [Held by provider] oxybutynin  15 mg Oral Daily    levothyroxine  112 mcg Oral Daily    potassium chloride  10 mEq IntraVENous Q1H    potassium chloride  40 mEq Oral Once     PRN Meds: acetaminophen, hydrALAZINE    Intake/Output Summary (Last 24 hours) at 1/8/2022 1736  Last data filed at 1/8/2022 1706  Gross per 24 hour   Intake 1150 ml   Output    Net 1150 ml     Exam:  BP (!) 154/64   Pulse 71   Temp 97.5 °F (36.4 °C)   Resp 16   Ht 5' 5\" (1.651 m)   Wt 200 lb (90.7 kg)   SpO2 95%   BMI 33.28 kg/m²   Head: Normocephalic, atraumatic  Sclera clear  Neck JVD flat  Lungs: normal effort of breathing    Labs:   Recent Labs     01/08/22  1100   WBC 19.4*   HGB 13.6   HCT 41.9        Recent Labs     01/08/22  1100      K 3.2*   CL 99   CO2 23   BUN 9   CREATININE 0.47*   CALCIUM 9.4   AST 16   ALT 15   BILITOT 0.6   ALKPHOS 98     No results for input(s): INR in the last 72 hours. Recent Labs     01/08/22  1100   TROPONINI <0.010     Radiology:  CT HEAD WO CONTRAST   Final Result      NO ACUTE INTRACRANIAL PROCESS OR SIGNIFICANT CHANGE FROM PRIOR STUDIES IDENTIFIED. XR CHEST PORTABLE   Final Result   NO ACUTE CARDIOPULMONARY DISEASE. Assessment/Plan:    Covid positive without hypoxia. No obvious evidence of pneumonia on chest x-ray. No active symptoms.   Patient does complain of weakness and dizziness which may be secondary to Covid however given

## 2022-01-08 NOTE — ED NOTES
Called Dr. Eugenia Pat office to have him paged to clarify Potassium IV order every hour.      Taylor Echevarria RN  01/08/22 8764

## 2022-01-08 NOTE — ED NOTES
Pt had cardiac pause on monitor for 10 secs. Pt was awake and alert.  BRADYCARDIC in 30's for another 10 sec then back to SR 78     Sarah Foster RN  01/08/22 7488

## 2022-01-08 NOTE — ACP (ADVANCE CARE PLANNING)
Advance Care Planning     Advance Care Planning Clinical Specialist  Conversation Note      Date of ACP Conversation: 1/8/2022    Conversation Conducted with: Patient with Decision Making Capacity    ACP Clinical Specialist: Braxton Peck RN        Healthcare Decision Maker:     Current Designated Healthcare Decision Maker:     Primary Decision Maker: Fran Mccann - Child - [phone]    Secondary Decision Maker: Navya Hoover - Child - [phone]     Click here to complete Healthcare Decision Makers including section of the Healthcare Decision Maker Relationship (ie \"Primary\")      Care Preferences    Hospitalization: \"If your health worsens and it becomes clear that your chance of recovery is unlikely, what would your preference be regarding hospitalization? \"    Choice:  [x] The patient wants hospitalization. [] The patient prefers comfort-focused treatment without hospitalization. Ventilation: \"If you were in your present state of health and suddenly became very ill and were unable to breathe on your own, what would your preference be about the use of a ventilator (breathing machine) if it were available to you? \"      If the patient would desire the use of ventilator (breathing machine), answer \"yes\". If not, \"no\": yes    \"If your health worsens and it becomes clear that your chance of recovery is unlikely, what would your preference be about the use of a ventilator (breathing machine) if it were available to you? \"     Would the patient desire the use of ventilator (breathing machine)?: No      Resuscitation  \"CPR works best to restart the heart when there is a sudden event, like a heart attack, in someone who is otherwise healthy. Unfortunately, CPR does not typically restart the heart for people who have serious health conditions or who are very sick. \"    \"In the event your heart stopped as a result of an underlying serious health condition, would you want attempts to be made to restart your heart (answer \"yes\" for attempt to resuscitate) or would you prefer a natural death (answer \"no\" for do not attempt to resuscitate)? \" yes       [x] Yes   [] No   Educated Patient / Declan Elsie regarding differences between Advance Directives and portable DNR orders.     Length of ACP Conversation in minutes: 15  Conversation Outcomes:  [x] ACP discussion completed  [] Existing advance directive reviewed with patient; no changes to patient's previously recorded wishes  [] New Advance Directive completed  [] Portable Do Not Rescitate prepared for Provider review and signature  [] POLST/POST/MOLST/MOST prepared for Provider review and signature      Follow-up plan:    [] Schedule follow-up conversation to continue planning  [] Referred individual to Provider for additional questions/concerns   [] Advised patient/agent/surrogate to review completed ACP document and update if needed with changes in condition, patient preferences or care setting    [x] This note routed to one or more involved healthcare providers

## 2022-01-08 NOTE — CARE COORDINATION
[facility] [place] Case Management Initial Discharge Assessment    Met with Patient AND DTR Ara Varner to discuss discharge plan. PCP: Bhargav Medeiros MD                                Date of Last Visit:   DR. CORONADO,  DR. Marcia Reilly (ITP)  DR. Kang Ansari ( BLADDER TRAINING Chaneta Pallas)    Discharge Planning    Living Arrangements: independently at home    Who do you live with? ALONE    Who helps you with your care:  self or DTR'S [address]    If lives at home:     Do you have any barriers navigating in your home? no    Patient can perform ADL? Yes    Current Services (outpatient and in home) :  None    Dialysis: No    Is transportation available to get to your appointments? Yes PT STILL DRIVES    DME Equipment:  yes - CANE HAS WALKER, DOES NOT USE    Respiratory equipment: CPAP without O2 HAS NOT WORN IN 3 YEARS    Respiratory provider:  yes - CC DR Lara Moran OFFICE     Pharmacy:  yes - [facility]    Consult with Medication Assistance Program?  No      Patient agreeable to [address] [address]? TBD    Patient agreeable to SNF/Rehab? N/A    Other discharge needs identified? Other DEPENDING ON CARDIOLOGY INPUT MONITOR FOR 02 ON D/C    Does Patient Have a High-Risk for Readmission Diagnosis (CHF, PN, MI, COPD)? NO    Initial Discharge Plan? (Note: please see concurrent daily documentation for any updates after initial note).     TO RETURN HOME DEPENDING ON CARDIOLOGY INPUT & MONITOR FOR HOME 02 NEEDS    Readmission Risk              Risk of Unplanned Readmission:  0         Electronically signed by Julio Jimenez RN on 1/8/2022 at 6:18 PM

## 2022-01-08 NOTE — PROGRESS NOTES
Telemetry strip reviewed, bradycardia and cardiac pause noted. Being primarily managed by cardiology.

## 2022-01-08 NOTE — ED NOTES
Bed: 19  Expected date: 1/8/22  Expected time: 10:27 AM  Means of arrival: Ambulance  Comments:  78 female flu-like n/v/d vertigo hx iv labs done      Chioma العلي RN  01/08/22 0471

## 2022-01-08 NOTE — ED PROVIDER NOTES
[facility] [facility] ED  eMERGENCY dEPARTMENT eNCOUnter      Pt Name: Akshat Longoria  MRN: [id number]  [place] 1940  Date of evaluation: 1/8/2022  Provider: Damián Carver MD    [address] [place] [address]       Chief Complaint   Patient presents with    Dizziness    Emesis         HISTORY OF PRESENT ILLNESS   (Location/Symptom, Timing/Onset,Context/Setting, Quality, Duration, Modifying Factors, Severity)  Note limiting factors. Akshat Longoria is a 78 y.o. female who presents to the emergency department dizziness and vomiting    78years old who have upper respiratory symptoms started about 3 weeks ago with cough congestion runny nose fever chills and body aches her symptoms have resolved few days ago but in the last couple of days started having nausea vomiting dizziness in the last 24 hours had multiple episodes of presyncope that lasted less than a couple of minutes followed by an attacks of nausea and vomiting at time. The history is provided by the patient. NursingNotes were reviewed. REVIEW OF SYSTEMS    (2-9 systems for level 4, 10 or more for level 5)     Review of Systems   Constitutional: Positive for activity change, appetite change and chills. HENT: Positive for congestion and rhinorrhea. Respiratory: Positive for cough and shortness of breath. Neurological: Positive for dizziness, syncope and light-headedness. Except as noted above the remainder of the review of systems was reviewed and negative.        PAST MEDICAL HISTORY     Past Medical History:   Diagnosis Date    High blood pressure     HIGH CHOLESTEROL     Overactive bladder     Thyroid disease          SURGICALHISTORY       Past Surgical History:   Procedure Laterality Date    CHOLECYSTECTOMY      EYE SURGERY Bilateral     cataract    HYSTERECTOMY      NERVE SURGERY N/A 7/28/2021    STAGE 1 & STAGE 2 INTERSTIM performed by Nghia Hernandez MD at [address] [facility]       Previous Medications ACETAMINOPHEN (APAP EXTRA STRENGTH) 500 MG TABLET    Take 2 tablets by mouth every 6 hours as needed for Pain    AMLODIPINE (NORVASC) 5 MG TABLET    TAKE 1 TABLET BY MOUTH DAILY    ASPIRIN 81 MG CHEWABLE TABLET    Take 81 mg by mouth daily    ATORVASTATIN (LIPITOR) 20 MG TABLET    TAKE 1 TABLET BY MOUTH DAILY AT BEDTIME    CEPHALEXIN (KEFLEX) 500 MG CAPSULE    Take 1 capsule by mouth 4 times daily    CHOLECALCIFEROL (D3-1000 PO)    Take  by mouth. IBUPROFEN (ADVIL;MOTRIN) 800 MG TABLET    Take 1 tablet by mouth every 6 hours as needed for Pain    LOSARTAN (COZAAR) 100 MG TABLET    Take 100 mg by mouth daily. MIRABEGRON (MYRBETRIQ) 50 MG TB24    Take 50 mg by mouth daily    MULTIPLE VITAMINS-MINERALS (PRESERVISION AREDS) CAPS    Take by mouth daily    MULTIPLE VITAMINS-MINERALS (THERAPEUTIC MULTIVITAMIN-MINERALS) TABLET    Take 1 tablet by mouth daily. NITROGLYCERIN (NITROSTAT) 0.4 MG SL TABLET    Place 1 tablet under the tongue every 5 minutes as needed for Chest pain up to max of 3 total doses. If no relief after 1 dose, call 911. OMEPRAZOLE (PRILOSEC OTC) 20 MG TABLET    Take 20 mg by mouth daily     OXYBUTYNIN (DITROPAN XL) 15 MG EXTENDED RELEASE TABLET    TAKE 1 TABLET DAILY    SYNTHROID 112 MCG TABLET    Take 112 mcg by mouth Daily        ALLERGIES     Patient has no known allergies. FAMILY HISTORY     History reviewed. No pertinent family history.        SOCIAL HISTORY       Social History     Socioeconomic History    Marital status:      Spouse name: None    Number of children: None    Years of education: None    Highest education level: None   Occupational History    None   Tobacco Use    Smoking status: Never Smoker    Smokeless tobacco: Never Used   Substance and Sexual Activity    Alcohol use: Yes     Comment: occasionally    Drug use: No    Sexual activity: None   Other Topics Concern    None   Social History Narrative    None     Social Determinants of Health Financial Resource Strain:     Difficulty of Paying Living Expenses: Not on file   Food Insecurity:     Worried About Running Out of Food in the Last Year: Not on file    Suzie of Food in the Last Year: Not on file   Transportation Needs:     Lack of Transportation (Medical): Not on file    Lack of Transportation (Non-Medical): Not on file   Physical Activity:     Days of Exercise per Week: Not on file    Minutes of Exercise per Session: Not on file   Stress:     Feeling of Stress : Not on file   Social Connections:     Frequency of Communication with Friends and Family: Not on file    Frequency of Social Gatherings with Friends and Family: Not on file    Attends Cheondoism Services: Not on file    Active Member of [address] [place] [address] [employer] or Organizations: Not on file    Attends Club or Organization Meetings: Not on file    Marital Status: Not on file   Intimate Partner Violence:     Fear of Current or Ex-Partner: Not on file    Emotionally Abused: Not on file    Physically Abused: Not on file    Sexually Abused: Not on file   Housing Stability:     Unable to Pay for Housing in the Last Year: Not on file    Number of [place] in the Last Year: Not on file    Unstable Housing in the Last Year: Not on file       SCREENINGS      @FLOW([id number])@      PHYSICAL EXAM    (up to 7 for level 4, 8 or more for level 5)     ED Triage Vitals   BP Temp Temp src Pulse Resp SpO2 Height Weight   01/08/22 1038 01/08/22 1036 -- 01/08/22 1036 01/08/22 1036 01/08/22 1036 01/08/22 1036 01/08/22 1036   (!) 170/68 97.5 °F (36.4 °C)  79 16 95 % 5' 5\" (1.651 m) 200 lb (90.7 kg)       Physical Exam  Vitals and nursing note reviewed. Constitutional:       Appearance: She is well-developed. She is obese. HENT:      Head: Normocephalic and atraumatic.       Right Ear: External ear normal.      Left Ear: External ear normal.      Nose: Nose normal.      Mouth/Throat:      Mouth: Mucous membranes are moist.   Eyes:      Pupils: Pupils are equal, round, and reactive to light. Cardiovascular:      Rate and Rhythm: Normal rate and regular rhythm. Heart sounds: Normal heart sounds. Pulmonary:      Effort: Pulmonary effort is normal. No respiratory distress. Breath sounds: Normal breath sounds. No stridor. No wheezing or rales. Chest:      Chest wall: No tenderness. Abdominal:      General: Bowel sounds are normal.      Palpations: Abdomen is soft. Musculoskeletal:         General: Normal range of motion. Cervical back: Normal range of motion and neck supple. Skin:     General: Skin is warm and dry. Neurological:      Mental Status: She is alert and oriented to person, place, and time. Cranial Nerves: No cranial nerve deficit. Sensory: No sensory deficit. Motor: No abnormal muscle tone. Coordination: Coordination normal.      Deep Tendon Reflexes: Reflexes normal.   Psychiatric:         Behavior: Behavior normal.         Thought Content: Thought content normal.         Judgment: Judgment normal.         DIAGNOSTIC RESULTS     EKG: All EKG's are interpreted by the Emergency Department Physician who either signs or Co-signsthis chart in the absence of a cardiologist.    EKG: Sinus rhythm with first-degree AV block with frequent PVCs rate 64 left axis deviation. RADIOLOGY:   Non-plain filmimages such as CT, Ultrasound and MRI are read by the radiologist. Plain radiographic images are visualized and preliminarily interpreted by the emergency physician with the below findings:        Interpretation per the Radiologist below, if available at the time ofthis note:    [address]   Final Result      NO ACUTE INTRACRANIAL PROCESS OR SIGNIFICANT CHANGE FROM PRIOR STUDIES IDENTIFIED. XR CHEST PORTABLE   Final Result   NO ACUTE CARDIOPULMONARY DISEASE.          XR ANKLE RIGHT (MIN 3 VIEWS)    (Results Pending)   Positive medial malleolar fracture      ED BEDSIDE ULTRASOUND: Performed by ED Physician - none    LABS:  Labs Reviewed   COVID-19, RAPID - Abnormal; Notable for the following components:       Result Value    SARS-CoV-2, NAAT DETECTED (*)     All other components within normal limits    Narrative:     CALL  [place]  [facility] tel. [phone],   COMPREHENSIVE METABOLIC PANEL - Abnormal; Notable for the following components:    Potassium 3.2 (*)     Glucose 172 (*)     CREATININE 0.47 (*)     All other components within normal limits   CBC WITH AUTO DIFFERENTIAL - Abnormal; Notable for the following components:    WBC 19.4 (*)     MCH 26.7 (*)     MCHC 32.5 (*)     Neutrophils Absolute 16.4 (*)     Monocytes Absolute 1.1 (*)     All other components within normal limits   URINE RT REFLEX TO CULTURE - Abnormal; Notable for the following components:    Ketones, Urine 15 (*)     Protein, UA TRACE (*)     All other components within normal limits   PROCALCITONIN   TROPONIN   BRAIN NATRIURETIC PEPTIDE   TSH WITHOUT REFLEX   BASIC METABOLIC PANEL   MAGNESIUM       All other labs were within normal range or not returned as of this dictation. EMERGENCY DEPARTMENT COURSE and DIFFERENTIAL DIAGNOSIS/MDM:   Vitals:    Vitals:    01/08/22 1038 01/08/22 1249 01/08/22 1306 01/08/22 1815   BP: (!) 170/68  (!) 154/64 (!) 157/55   Pulse:  (!) 37 71 79   Resp:   16 18   Temp:       SpO2:   95% 95%   Weight:       Height:                     MDM  Number of Diagnoses or Management Options  Closed fracture of right ankle, initial encounter  COVID-19 virus infection: new and requires workup  Dehydration: new and requires workup  Sick sinus syndrome ([address]): new and requires workup  Symptomatic bradycardia: new and requires workup  Diagnosis management comments: 78-year-old-year-old male patient with known history of hypertension who presented to the ER with 3 weeks URI symptoms followed by 2 days of nausea vomiting and diarrhea and episode of dizziness and presyncope.   In the ER was found to be positive for COVID-19 chest x-ray showed no COVID-pneumonia but patient had persistent nausea and vomiting in the ER had multiple episode about 3 of 4 of asystole episodes lasted only for 12-second was followed by an episode of vomiting and bradycardia for another 10-second all those episodes resolved spontaneously back to a heart rate up to the 70. Cardiologist was notified with the patient congestion Dr. Pauly Andres  came to the ER evaluated the patient and looked at the monitors start agreed on the patient diagnosis of likely sick sinus syndrome the possible need for pacemaker placement patient will be admitted under cardiology sick sinus syndrome an episode of cardiac pauses with an internal medicine consult for management of COVID    Also complained about right ankle pain x-ray confirmed right malleolus fracture ankle splint was placed in the ER       Amount and/or Complexity of Data Reviewed  Clinical lab tests: ordered and reviewed  Tests in the radiology section of CPT®: ordered and reviewed    Risk of Complications, Morbidity, and/or Mortality  Presenting problems: high  Diagnostic procedures: high  Management options: high    Critical Care  Total time providing critical care: 30-74 minutes    Patient Progress  Patient progress: improved (Improved but remained critical)     CONSULTS:  IP CONSULT TO CARDIOLOGY  IP CONSULT TO INTERNAL MEDICINE  IP CONSULT TO HOSPITALIST  IP CONSULT TO GI  IP CONSULT TO CARDIOLOGY    PROCEDURES:  Unless otherwise noted below, none     Procedures    FINAL IMPRESSION      1. Dehydration    2. COVID-19 virus infection    3. Symptomatic bradycardia    4. Sick sinus syndrome (HCC)    5. Closed fracture of right ankle, initial encounter          DISPOSITION/PLAN   DISPOSITION Decision To Admit 01/08/2022 07:08:14 PM      PATIENT REFERRED TO:  No follow-up provider specified.     DISCHARGE MEDICATIONS:  New Prescriptions    No medications on file          (Please note thatportions of this note were completed with a voice recognition program.  Efforts were made to edit the dictations but occasionally words are mis-transcribed.)    Johana Madera MD (electronically signed)  Attending Emergency Physician          Erin Ramirez MD  01/08/22 Deshaun Ramirez MD  01/08/22 2052

## 2022-01-09 ENCOUNTER — APPOINTMENT (OUTPATIENT)
Dept: GENERAL RADIOLOGY | Age: 82
DRG: 242 | End: 2022-01-09
Payer: MEDICARE

## 2022-01-09 LAB
ANION GAP SERPL CALCULATED.3IONS-SCNC: 13 MEQ/L (ref 9–15)
BUN BLDV-MCNC: 10 MG/DL (ref 8–23)
CALCIUM SERPL-MCNC: 8.7 MG/DL (ref 8.5–9.9)
CHLORIDE BLD-SCNC: 102 MEQ/L (ref 95–107)
CO2: 21 MEQ/L (ref 20–31)
CREAT SERPL-MCNC: 0.48 MG/DL (ref 0.5–0.9)
GFR AFRICAN AMERICAN: >60
GFR NON-AFRICAN AMERICAN: >60
GLUCOSE BLD-MCNC: 129 MG/DL (ref 70–99)
HCT VFR BLD CALC: 37.2 % (ref 37–47)
HEMOGLOBIN: 12 G/DL (ref 12–16)
MAGNESIUM: 2.1 MG/DL (ref 1.7–2.4)
MCH RBC QN AUTO: 26.4 PG (ref 27–31.3)
MCHC RBC AUTO-ENTMCNC: 32.3 % (ref 33–37)
MCV RBC AUTO: 81.6 FL (ref 82–100)
PDW BLD-RTO: 14.2 % (ref 11.5–14.5)
PLATELET # BLD: 298 K/UL (ref 130–400)
POTASSIUM SERPL-SCNC: 3.6 MEQ/L (ref 3.4–4.9)
RBC # BLD: 4.56 M/UL (ref 4.2–5.4)
SODIUM BLD-SCNC: 136 MEQ/L (ref 135–144)
WBC # BLD: 20.2 K/UL (ref 4.8–10.8)

## 2022-01-09 PROCEDURE — 71045 X-RAY EXAM CHEST 1 VIEW: CPT

## 2022-01-09 PROCEDURE — 36415 COLL VENOUS BLD VENIPUNCTURE: CPT

## 2022-01-09 PROCEDURE — 2000000000 HC ICU R&B

## 2022-01-09 PROCEDURE — 99222 1ST HOSP IP/OBS MODERATE 55: CPT | Performed by: SPECIALIST

## 2022-01-09 PROCEDURE — 99223 1ST HOSP IP/OBS HIGH 75: CPT | Performed by: INTERNAL MEDICINE

## 2022-01-09 PROCEDURE — 73630 X-RAY EXAM OF FOOT: CPT

## 2022-01-09 PROCEDURE — 6370000000 HC RX 637 (ALT 250 FOR IP): Performed by: INTERNAL MEDICINE

## 2022-01-09 PROCEDURE — 6360000002 HC RX W HCPCS: Performed by: INTERNAL MEDICINE

## 2022-01-09 PROCEDURE — 92953 TEMPORARY EXTERNAL PACING: CPT

## 2022-01-09 PROCEDURE — 2580000003 HC RX 258: Performed by: INTERNAL MEDICINE

## 2022-01-09 PROCEDURE — 85027 COMPLETE CBC AUTOMATED: CPT

## 2022-01-09 PROCEDURE — 93005 ELECTROCARDIOGRAM TRACING: CPT | Performed by: INTERNAL MEDICINE

## 2022-01-09 PROCEDURE — 80048 BASIC METABOLIC PNL TOTAL CA: CPT

## 2022-01-09 PROCEDURE — 83735 ASSAY OF MAGNESIUM: CPT

## 2022-01-09 RX ADMIN — LEVOTHYROXINE SODIUM 112 MCG: 112 TABLET ORAL at 05:52

## 2022-01-09 RX ADMIN — LOSARTAN POTASSIUM 100 MG: 100 TABLET, FILM COATED ORAL at 13:04

## 2022-01-09 RX ADMIN — Medication 10 ML: at 13:05

## 2022-01-09 RX ADMIN — MORPHINE SULFATE 2 MG: 2 INJECTION, SOLUTION INTRAMUSCULAR; INTRAVENOUS at 22:13

## 2022-01-09 RX ADMIN — POTASSIUM CHLORIDE 40 MEQ: 1500 TABLET, EXTENDED RELEASE ORAL at 00:37

## 2022-01-09 RX ADMIN — ONDANSETRON 4 MG: 2 INJECTION INTRAMUSCULAR; INTRAVENOUS at 04:45

## 2022-01-09 RX ADMIN — Medication 10 ML: at 20:00

## 2022-01-09 RX ADMIN — ONDANSETRON 4 MG: 2 INJECTION INTRAMUSCULAR; INTRAVENOUS at 22:13

## 2022-01-09 RX ADMIN — MORPHINE SULFATE 2 MG: 2 INJECTION, SOLUTION INTRAMUSCULAR; INTRAVENOUS at 04:45

## 2022-01-09 RX ADMIN — PANTOPRAZOLE SODIUM 40 MG: 40 TABLET, DELAYED RELEASE ORAL at 05:52

## 2022-01-09 RX ADMIN — AMLODIPINE BESYLATE 5 MG: 5 TABLET ORAL at 08:34

## 2022-01-09 RX ADMIN — HYDRALAZINE HYDROCHLORIDE 10 MG: 20 INJECTION INTRAMUSCULAR; INTRAVENOUS at 06:03

## 2022-01-09 ASSESSMENT — PAIN SCALES - GENERAL
PAINLEVEL_OUTOF10: 7
PAINLEVEL_OUTOF10: 0
PAINLEVEL_OUTOF10: 6
PAINLEVEL_OUTOF10: 0
PAINLEVEL_OUTOF10: 5
PAINLEVEL_OUTOF10: 0
PAINLEVEL_OUTOF10: 9
PAINLEVEL_OUTOF10: 0
PAINLEVEL_OUTOF10: 0

## 2022-01-09 NOTE — PLAN OF CARE
Problem: Falls - Risk of:  Goal: Will remain free from falls  Description: Will remain free from falls  Outcome: Ongoing  Goal: Absence of physical injury  Description: Absence of physical injury  Outcome: Ongoing     Problem: Skin Integrity:  Goal: Will show no infection signs and symptoms  Description: Will show no infection signs and symptoms  Outcome: Ongoing  Goal: Absence of new skin breakdown  Description: Absence of new skin breakdown  Outcome: Ongoing     Problem: Pain:  Goal: Pain level will decrease  Description: Pain level will decrease  Outcome: Ongoing  Goal: Control of acute pain  Description: Control of acute pain  Outcome: Ongoing  Goal: Control of chronic pain  Description: Control of chronic pain  Outcome: Ongoing     Problem: Airway Clearance - Ineffective  Goal: Achieve or maintain patent airway  Outcome: Ongoing     Problem: Gas Exchange - Impaired  Goal: Absence of hypoxia  Outcome: Ongoing  Goal: Promote optimal lung function  Outcome: Ongoing     Problem: Breathing Pattern - Ineffective  Goal: Ability to achieve and maintain a regular respiratory rate  Outcome: Ongoing     Problem:  Body Temperature -  Risk of, Imbalanced  Goal: Ability to maintain a body temperature within defined limits  Outcome: Ongoing  Goal: Will regain or maintain usual level of consciousness  Outcome: Ongoing  Goal: Complications related to the disease process, condition or treatment will be avoided or minimized  Outcome: Ongoing     Problem: Isolation Precautions - Risk of Spread of Infection  Goal: Prevent transmission of infection  Outcome: Ongoing     Problem: Nutrition Deficits  Goal: Optimize nutritional status  Outcome: Ongoing     Problem: Risk for Fluid Volume Deficit  Goal: Maintain normal heart rhythm  Outcome: Ongoing  Goal: Maintain absence of muscle cramping  Outcome: Ongoing  Goal: Maintain normal serum potassium, sodium, calcium, phosphorus, and pH  Outcome: Ongoing     Problem: Loneliness or Risk for Loneliness  Goal: Demonstrate positive use of time alone when socialization is not possible  Outcome: Ongoing     Problem: Fatigue  Goal: Verbalize increase energy and improved vitality  Outcome: Ongoing     Problem: Patient Education: Go to Patient Education Activity  Goal: Patient/Family Education  Outcome: Ongoing     Problem: Discharge Planning:  Goal: Participates in care planning  Description: Participates in care planning  Outcome: Ongoing  Goal: Discharged to appropriate level of care  Description: Discharged to appropriate level of care  Outcome: Ongoing     Problem:  Bowel Function - Altered:  Goal: Bowel elimination is within specified parameters  Description: Bowel elimination is within specified parameters  Outcome: Ongoing     Problem: Cardiac Output - Decreased:  Goal: Hemodynamic stability will improve  Description: Hemodynamic stability will improve  Outcome: Ongoing

## 2022-01-09 NOTE — CONSULTS
Consults    Patient Name: Abhi Aguilera  Admit Date: 2022 10:33 AM  MR #: [id number]  : 1940    Attending Physician: Pooja Vann MD  Reason for consult: Nausea and vomiting    History of Presenting Illness:      Abhi Aguilera is a 78 y.o. female on hospital day 1 with a history of nausea and vomiting which started 2 days ago, no history of hematemesis or melena. When she came to the ER she was in significant bradycardia and required a temporary pacemaker. . She has not had any nausea or vomiting since coming to the hospital and has been on Zofran. . Patient is not a very reliable historian she reports no abdominal pain, also has been doing frequent falls and sustained a ankle fracture.,  Patient is also positive for Covid. History Obtained From:  patient      History:      Past Medical History:   Diagnosis Date    High blood pressure     HIGH CHOLESTEROL     Overactive bladder     Thyroid disease      Past Surgical History:   Procedure Laterality Date    CHOLECYSTECTOMY      EYE SURGERY Bilateral     cataract    HYSTERECTOMY      NERVE SURGERY N/A 2021    STAGE 1 & STAGE 2 INTERSTIM performed by Raine Pompa MD at [address] [place] [address] History  History reviewed. No pertinent family history.   [] Unable to obtain due to ventilated and/ or neurologic status    Social History     Socioeconomic History    Marital status:      Spouse name: Not on file    Number of children: Not on file    Years of education: Not on file    Highest education level: Not on file   Occupational History    Not on file   Tobacco Use    Smoking status: Never Smoker    Smokeless tobacco: Never Used   Substance and Sexual Activity    Alcohol use: Yes     Comment: occasionally    Drug use: No    Sexual activity: Not on file   Other Topics Concern    Not on file   Social History Narrative    Not on file     Social Determinants of Health     Financial Resource Strain:     Difficulty of Paying Living Expenses: Not on file   Food Insecurity:     Worried About Running Out of Food in the Last Year: Not on file    Ran Out of Food in the Last Year: Not on file   Transportation Needs:     Lack of Transportation (Medical): Not on file    Lack of Transportation (Non-Medical): Not on file   Physical Activity:     Days of Exercise per Week: Not on file    Minutes of Exercise per Session: Not on file   Stress:     Feeling of Stress : Not on file   Social Connections:     Frequency of Communication with Friends and Family: Not on file    Frequency of Social Gatherings with Friends and Family: Not on file    Attends Christianity Services: Not on file    Active Member of [address] [place] [address] [employer] or Organizations: Not on file    Attends Club or Organization Meetings: Not on file    Marital Status: Not on file   Intimate Partner Violence:     Fear of Current or Ex-Partner: Not on file    Emotionally Abused: Not on file    Physically Abused: Not on file    Sexually Abused: Not on file   Housing Stability:     Unable to Pay for Housing in the Last Year: Not on file    Number of [place] in the Last Year: Not on file    Unstable Housing in the Last Year: Not on file      [] Unable to obtain due to ventilated and/ or neurologic status      Home Medications:      Medications Prior to Admission: amLODIPine (NORVASC) 5 MG tablet, TAKE 1 TABLET BY MOUTH DAILY  SYNTHROID 112 MCG tablet, Take 112 mcg by mouth Daily   atorvastatin (LIPITOR) 20 MG tablet, TAKE 1 TABLET BY MOUTH DAILY AT BEDTIME  oxybutynin (DITROPAN XL) 15 MG extended release tablet, TAKE 1 TABLET DAILY  aspirin 81 MG chewable tablet, Take 81 mg by mouth daily  Multiple Vitamins-Minerals (PRESERVISION AREDS) CAPS, Take by mouth daily  Multiple Vitamins-Minerals (THERAPEUTIC MULTIVITAMIN-MINERALS) tablet, Take 1 tablet by mouth daily. losartan (COZAAR) 100 MG tablet, Take 100 mg by mouth daily.     omeprazole (PRILOSEC OTC) 20 MG tablet, Take 20 mg by mouth jugular venous distention. Heart: Regular, no murmur, no rub/gallop. No right ventricular heave. Lungs: Clear to ascultation, no rales/wheezing/rhonchi. Good chest wall excursion. Abdomen: Obese soft nontender no palpable mass  Extremities: No clubbing/cyanosis, no edema. Skin: Warm, dry, normal turgor, no rash, no bruise, no petichiae. Neuro: No myoclonus or tremor. Psych: Normal affect    Results/ Medications reviewed 1/9/2022, 8:06 AM     Laboratory, Microbiology, Pathology, Radiology, Cardiology, Medications and Transcriptions reviewed  Scheduled Meds:   amLODIPine  5 mg Oral Daily    losartan  100 mg Oral Daily    pantoprazole  40 mg Oral QAM AC    [Held by provider] oxybutynin  15 mg Oral Daily    levothyroxine  112 mcg Oral Daily    sodium chloride flush  5-40 mL IntraVENous 2 times per day     Continuous Infusions:   DOPamine 2.5 mcg/kg/min (01/08/22 1425)    sodium chloride Stopped (01/08/22 2200)    sodium chloride         Recent Labs     01/08/22  1100 01/09/22  0503   WBC 19.4* 20.2*   HGB 13.6 12.0   HCT 41.9 37.2   MCV 82.0 81.6*    298     Recent Labs     01/08/22  1100 01/09/22  0503    136   K 3.2* 3.6   CL 99 102   CO2 23 21   BUN 9 10   CREATININE 0.47* 0.48*     Recent Labs     01/08/22  1100   AST 16   ALT 15   BILITOT 0.6   ALKPHOS 98     No results for input(s): LIPASE, AMYLASE in the last 72 hours. Recent Labs     01/08/22  1100   PROT 6.9     XR ANKLE RIGHT (MIN 3 VIEWS)    Result Date: 1/9/2022  XR ANKLE RIGHT (MIN 3 VIEWS): 1/8/2022 CLINICAL HISTORY: Pain after fall. COMPARISON: None available. TECHNIQUE: Portable AP, lateral and oblique radiographs of the left ankle were obtained. FINDINGS: Best visualized on the AP view, fractures of the second metatarsal and probably third, fourth and fifth metatarsals are noted. Dedicated radiographs of the right foot are suggested. A nondisplaced fracture of the medial malleolus is present.  There is no other fracture, dislocation, radiodense foreign bodies, pathologic calcifications, or worrisome bone destruction identified. Mild soft tissue swelling and degenerative changes of the ankle are noted. INCOMPLETELY VISUALIZED METATARSAL FRACTURES, AS NOTED. DEDICATED RIGHT FOOT RADIOGRAPHS ARE SUGGESTED. NONDISPLACED MEDIAL MALLEOLAR FRACTURE. The findings were discussed with the patient's ICU nurse at approximately 7:28 AM on 1/9/2022. CT HEAD WO CONTRAST    Result Date: 1/8/2022  CT HEAD WO CONTRAST : 1/8/2022 CLINICAL HISTORY: Dizziness. COMPARISON: Head MRI 1/30/2012 and head CT 12/16/2011. TECHNIQUE: Spiral unenhanced images were obtained of the head, with routine multiplanar reconstructions performed. All CT scans at this facility use dose modulation, iterative reconstruction, and/or weight based dosing when appropriate to reduce radiation dose to as low as reasonably achievable. FINDINGS: There is no intracranial hemorrhage, mass effect, midline shift, extra-axial collection, evidence of hydrocephalus, recent ischemic infarct, or skull fracture identified. Mild to moderate generalized cerebral volume loss and mild white matter changes have mildly progressed from the prior studies. The mastoid air cells and visualized paranasal sinuses are essentially clear. NO ACUTE INTRACRANIAL PROCESS OR SIGNIFICANT CHANGE FROM PRIOR STUDIES IDENTIFIED. XR CHEST PORTABLE    Result Date: 1/9/2022  XR CHEST PORTABLE : 1/9/2022 CLINICAL HISTORY:  Pacemaker placement and rule out pneumothorax . COMPARISON: 1/8/2022. TECHNIQUE: A portable upright AP radiograph of the chest was obtained. FINDINGS: The heart is mildly enlarged with an inferior vena cava approach pacer lead with its tip overlying the cardiac apex. A poor inspiratory volume is present with mild left basilar infiltrate/atelectasis. Bronchopneumonia should be excluded clinically.  There is no sizable pleural effusion, gross vascular congestion, pneumothorax, or displaced fractures identified. INFERIOR VENA CAVA APPROACH PACER LEADS IN EXPECTED POSITION. POOR INSPIRATION WITH MILD LEFT BASILAR INFILTRATE/ATELECTASIS. BRONCHOPNEUMONIA SHOULD BE EXCLUDED CLINICALLY. XR CHEST PORTABLE    Result Date: 1/8/2022  EXAMINATION: XR CHEST PORTABLE CLINICAL HISTORY: WEAKNESS COMPARISONS: DECEMBER 15, 2011 FINDINGS: Osseous structures intact. Cardiopericardial silhouette normal. Pulmonary vasculature normal. Lungs clear. NO ACUTE CARDIOPULMONARY DISEASE. Impression:   66-year-old female admitted with nausea vomiting and significant bradycardia requiring pacemaker placement electrolytes are all unremarkable liver enzymes are normal white count is 20.2 hemoglobin height is 12 and 37.2. Today patient is stable and has been tolerating clear liquids as per the staff nurse. Transient nausea and vomiting probably gastroenteritis. Plan:   Since patient is clinically improving will observe for now. Advance diet as tolerated. Comments: Thank you for allowing us to participate in the care of this patient. Will continue to follow. Please call if questions or concerns arise.     Electronically signed by Verna Fortune MD on 1/9/2022 at 8:06 AM

## 2022-01-09 NOTE — PROGRESS NOTES
0700am Report from [employer]. Alert and oriented. No neuro defecits at this time. Temporary pacer in R groin site without hematoma. Dressing dry and intact. Rate 60 MA 5 MP-SR no ectopy [place]. 07:30am Daughter called and updated via phone. Dr Lauri Taylor in and updated   09:42am DR Pankaj Langston in and updated on status. Orders   12 noon Daughter Marti Jones in for hour visit and updated on status  13;15pm DR Rashawn Ceja in to see patient and updated on status  1500pm Dr Ketty Pereira in and updated on patient. Spoke with Daughter Marti Jones via phone  1800pm Resting without issues. Transvenous pacer intact.  Dressing intact  18:50pm DR Aby Moon updated on patient status

## 2022-01-09 NOTE — BRIEF OP NOTE
Section of Cardiology  Adult Brief Pacemaker Procedure Note        Procedure(s):  Pacemaker TV    Pre-operative Diagnosis: Significant sinus pauses with syncope    H&P Status: Completed and reviewed.      Post-operative Diagnosis:  Successful TVPM placement       Findings: see full report  6 Samoan femoral vein sheath insertion  Successful TVP placement and set at 5 A and 60 bpm backup rate  (threshold reached at 0.4 A)    Complications:  None    Recommendations:  Transfer patient to ICU  Stop dopamine, blood pressure in the 170s  Continue management of Covid infection as per primary team  Continue supportive care as per general cardiology team  Patient may need a coronary angiogram  Avoid beta-blockers  Please keep potassium above 4 and magnesium above 2      Primary Proceduralist:   Dr. Meghan Raymond       Full procedure note to follow

## 2022-01-09 NOTE — CONSULTS
Dictated    Admitted for syncope due to significant pauses up to 15 seconds of duration  Now on dopamine  S/p tem,porary pacer wire placed    Plan:  Patient needs pacemaker during this admission. Will coordinate with cath lab staff about procedure in COVID-19 patient  Keep dopamine.   Temp pacer wire in placed  WBC elevated, patient needs to be cleared from ID standpoint for pacemaker  Continue with telemetry    Roman James MD

## 2022-01-09 NOTE — ED NOTES
Pt c/o pain in right ankle from fall this morning. Pt states she forgot to tell anyone that it was hurting earlier.  Dr Livan Lambert made aware of pt complaint     Kyra Giang RN  01/08/22 2008

## 2022-01-09 NOTE — H&P
[place] [address]                      [address] [facility], [address] [facility]                              HISTORY AND PHYSICAL    PATIENT NAME: Cathy Win                 :        1940  MED REC NO:   [id number]                            ROOM:       23  ACCOUNT NO:   [de-identified]                           ADMIT DATE: 2022  PROVIDER:     Oliver Nash MD    HISTORY OF PRESENT ILLNESS:  This is an admission history and physical  due to the patient having some asystole. This was discussed with the  primary hospitalist.  We will admit with their consult. This is a  pleasant patient of Dr. Destiny De La Cruz who has a history of hypertension,  obstructive sleep apnea, and there is in his problem list some  idioventricular rhythm, although this has not been well delineated. She  has a history of a heart catheterization, which showed no severe  obstructive disease. She has no severe carotid disease. She has a  questionable aneurysm, but her ultrasound showed no significant  abdominal aortic aneurysm. Most recently, the patient has felt ill over the last couple of weeks. She has also had some continuous nausea. She has also had a gabo  syncopal spell this morning when she woke up on the floor. She has had  several lightheaded and dizzy spells since that time and she continues  to complain of nausea. She was placed on the monitor and had definitely episodes of asystole,  which certainly could correlate with her symptoms. PHYSICAL EXAMINATION:  GENERAL:  The patient appears to be in no acute distress, but is still  complaining of nausea. HEENT:  Atraumatic and normocephalic. NECK:  She has no obvious jugular venous distention. HEART:  She has gotten regular rate and rhythm. PMI is not laterally  displaced. No obvious murmur, S3, or S4 is noted. LUNGS:  Clear. ABDOMEN:  Overweight. EXTREMITIES:  With trace of edema.     IMAGING DATA:  She had a CT scan, which showed no acute process. She tested positive for COVID-19. Her EKG showed some incomplete left bundle-branch block. There has been  no real change from previous EKGs. In 2019, she had a normal heart catheterization in terms of coronary  artery disease. She also has a history of varicose veins. She also has  a history of apparently a bladder stimulator from [employer]. LABORATORY DATA:  Her lab work shows negative troponins. Her potassium  is low at 3.2. She is COVID-19 positive. Duration is unclear. Her  other laboratory work is notable for the following:  She has a mildly  elevated glucose. She has an elevated white count. She had a CT scan showing no intracranial process. She had a chest  x-ray showing no acute pulmonary disease. Her other past medical history is pending. ASSESSMENT:  1. Asystole, certainly enough to cause the patient to have a fainting  spell and several lightheaded and dizzy spells. Traditionally, with  continued nausea, this is most likely due to high vagal tone. The  patient says that over the last 48 to 72 hours, the patient has had  constant nausea. 2.  COVID-19.  3.  Hypokalemia. PLAN:  1. In the short term, we will try low-dose dopamine and consider  Isuprel if absolutely needed. Thankfully, she will be monitored input  and put on bedrest.  2.  I have asked the emergency room physician as well as the hospitalist  to help control her nausea. 3.  We also talked about a possible pacemaker. Even if she does get  better, this is no guarantee that she would not get nausea in the near  future. The procedure was described to the patient and the patient's  daughter in depth. The risks of the procedure including rare death,  cardiac tamponade, pneumothorax, bleeding, venous damage, contrast media  nephropathy, and lead dislodgement were discussed with the patient and  the patient's daughter.   However, given her COVID status and her  continuous nausea, hopefully, we will be able to quell her acute problem  medically and then consider pacemaker in a more elective fashion if her  symptoms do not resolve. The patient will be admitted to telemetry and  the patient will be started on low-dose dopamine. Hospitalist will  consult for treatment of COVID-19.  4.  Also consult to control her nausea. 5.  Consider GI consultation. 6.  Further recommendations pending. 7.  The patient had a relatively recent echocardiogram _____ that would  necessarily helpless in this particular episode because her symptoms are  mostly electrical without evidence of heart failure or ischemia.         Oscar Holm MD    D: 01/08/2022 14:07:04       T: 01/08/2022 14:11:25     [email or web address]  Job#: [id number]     Doc#: [id number]    CC:

## 2022-01-09 NOTE — PROGRESS NOTES
Spiritual Care Services     Summary of Visit:      Spiritual Assessment/Intervention/Outcomes:    Encounter Summary  Services provided to[de-identified] Patient  Referral/Consult From[de-identified] Rounding  Support System: Children  Continue Visiting: No  Complexity of Encounter: Low  Length of Encounter: 15 minutes  Spiritual Assessment Completed: Yes  Routine  Type: Initial  Assessment: Calm,Approachable,Sleeping  Intervention: [place],Sustaining presence/ Ministry of presence  Outcome: Receptive              Primary Decision Maker (Healthcare Proxy)  Patient's Healthcare Decision Maker is[de-identified] Legal Next of [address] [place] [address],[address]:        [address] [facility]   Electronically signed by Diana Guthrie on 1/9/22 at 3:56 PM EST     To reach a  for emotional and spiritual support, place an [facility] consult request.   If a  is needed immediately, dial 0 and ask to page the on-call .

## 2022-01-09 NOTE — CONSULTS
Pulmonary and Critical Care Medicine  Consult Note  Encounter Date: 2022 12:52 PM    Ms. Dick Domingo is a 78 y.o. female  : 1940  Requesting Provider: Emanuel Fields MD   Reason for request:  Acute respiratory failure/Covid           HISTORY OF PRESENT ILLNESS:    Dick Domingo is a 78 y.o., , female who has a past medical history of  has a past medical history of High blood pressure, HIGH CHOLESTEROL, Overactive bladder, and Thyroid disease. that is hospitalized for syncopal episodes/COVID-19    HPI   Patient has been home with nausea for the past 2 or 3 days. Patient has had multiple syncopal episodes and falls at home over the last couple of weeks. Presented to the emergency room with bradycardia and possible asystole with nausea. Patient has a newly diagnosis of COVID-19. Patient was sent to the Cath Lab and had a transvenous pacer placed in right groin. Currently being paced in the ICU bed 17. Patient denies nausea or any vomiting at this time. Patient is on 3 L nasal cannula sats are 95%. Patient remains on a dopamine drip at 2.5 mcg/kg/min. no fever overnight, urine output 325 overnight. Patient is able to eat and ate about 20% of her breakfast.  Past Medical History:        Diagnosis Date    High blood pressure     HIGH CHOLESTEROL     Overactive bladder     Thyroid disease        Past Surgical History:        Procedure Laterality Date    CHOLECYSTECTOMY      EYE SURGERY Bilateral     cataract    HYSTERECTOMY      NERVE SURGERY N/A 2021    STAGE 1 & STAGE 2 INTERSTIM performed by Blanca Hoover MD at [place] [address] History:     reports that she has never smoked. She has never used smokeless tobacco. She reports current alcohol use. She reports that she does not use drugs. Family History:   History reviewed. No pertinent family history. Allergies:  Patient has no known allergies.     MEDICATIONS during current hospitalization: Continuous Infusions:   DOPamine 2.5 mcg/kg/min (01/08/22 1425)    sodium chloride Stopped (01/08/22 2200)    sodium chloride         Scheduled Meds:   amLODIPine  5 mg Oral Daily    losartan  100 mg Oral Daily    pantoprazole  40 mg Oral QAM AC    [Held by provider] oxybutynin  15 mg Oral Daily    levothyroxine  112 mcg Oral Daily    sodium chloride flush  5-40 mL IntraVENous 2 times per day       PRN Meds:acetaminophen, hydrALAZINE, sodium chloride flush, sodium chloride, ondansetron, morphine **OR** morphine    REVIEW OF SYSTEMS:      Review of Systems    PHYSICAL EXAM:    Vitals:  BP (!) 158/51   Pulse 76   Temp 98.4 °F (36.9 °C) (Oral)   Resp 21   Ht 5' 5\" (1.651 m)   Wt 203 lb 11.3 oz (92.4 kg)   SpO2 95%   BMI 33.90 kg/m²     General: alert, cooperative  Head: normocephalic, atraumatic  Eyes:No gross abnormalities. ENT:  MMM no lesions  Neck:  supple and no masses  Chest : Good air movement, no wheezes no rales nontender tympanic  Heart[de-identified] Transvenously paced  ABD:  bowel sounds normal, soft, non-tender  Musculoskeletal : no cyanosis and no clubbing trace edema   Neuro:  Grossly normal  Skin: No rashes or nodules noted. Lymph node:  no cervical nodes  Urology: Yes [place]   Psychiatric: appropriate    Data Review  Recent Labs     01/08/22  1100 01/09/22  0503   WBC 19.4* 20.2*   HGB 13.6 12.0   HCT 41.9 37.2    298      Recent Labs     01/08/22  1100 01/09/22  0503    136   K 3.2* 3.6   CL 99 102   CO2 23 21   BUN 9 10   CREATININE 0.47* 0.48*   GLUCOSE 172* 129*       MV Settings: ABGs: No results for input(s): PHART, GXL8HOE, PO2ART, KAI7SIV, BEART, C1NZTIXL, FLS9XQH in the last 72 hours. O2 Device: Nasal cannula  O2 Flow Rate (L/min): 3 L/min  Lab Results   Component Value Date    LACTA 0.7 12/15/2011       Radiology     Most recent    Chest CT      WITH CONTRAST:No results found for this or any previous visit.        WITHOUT CONTRAST: No results found for this or any previous visit. CXR      2-view: No results found for this or any previous visit. Portable: Results for orders placed during the hospital encounter of 01/08/22    XR CHEST PORTABLE    Narrative  XR CHEST PORTABLE : 1/9/2022    CLINICAL HISTORY:  Pacemaker placement and rule out pneumothorax . COMPARISON: 1/8/2022. TECHNIQUE: A portable upright AP radiograph of the chest was obtained. FINDINGS:    The heart is mildly enlarged with an inferior vena cava approach pacer lead with its tip overlying the cardiac apex. A poor inspiratory volume is present with mild left basilar infiltrate/atelectasis. Bronchopneumonia should be excluded clinically. There is no sizable pleural effusion, gross vascular congestion, pneumothorax, or displaced fractures identified. Impression  INFERIOR VENA CAVA APPROACH PACER LEADS IN EXPECTED POSITION. POOR INSPIRATION WITH MILD LEFT BASILAR INFILTRATE/ATELECTASIS. BRONCHOPNEUMONIA SHOULD BE EXCLUDED CLINICALLY. Echo No results found for this or any previous visit. Assessment, plan:    This is a critically ill patient at risk of deterioration / death , needing close ICU monitoring and intervention due to below noted problems    COVID-19 without hypoxia   Syncope, most likely sick sinus syndrome, transvenous pacer placed   Nausea and vomiting, improving    Recommendation   Replace electrolytes as needed   Watch urine output and volume status   Monitor electrolyte, fluid management    Monitor blood sugar target 140180   DVT prophylaxis once okay with GI and cardiology   DC IV fluids   Maintain O2 to keep sats above 93%   Procalcitonin tomorrow   Monitor right groin for bleeding, transvenous pacer placed     Thank you for consultation    Electronically signed by NORM Cano CNP on 1/9/2022 at 12:52 PM

## 2022-01-09 NOTE — PROGRESS NOTES
Hospitalist Progress Note      Date of Admission: 1/8/2022  Chief Complaint:    Chief Complaint   Patient presents with    Dizziness    Emesis     Subjective:  No new complaints. No nausea, vomiting, chest pain, or headache      Medications:    Infusion Medications    DOPamine 2.5 mcg/kg/min (01/08/22 1425)    sodium chloride       Scheduled Medications    amLODIPine  5 mg Oral Daily    losartan  100 mg Oral Daily    pantoprazole  40 mg Oral QAM AC    [Held by provider] oxybutynin  15 mg Oral Daily    levothyroxine  112 mcg Oral Daily    sodium chloride flush  5-40 mL IntraVENous 2 times per day     PRN Meds: acetaminophen, hydrALAZINE, sodium chloride flush, sodium chloride, ondansetron, morphine **OR** morphine    Intake/Output Summary (Last 24 hours) at 1/9/2022 1427  Last data filed at 1/9/2022 1200  Gross per 24 hour   Intake 1439 ml   Output 325 ml   Net 1114 ml     Exam:  BP (!) 157/55   Pulse 74   Temp 98.4 °F (36.9 °C) (Oral)   Resp 21   Ht 5' 5\" (1.651 m)   Wt 203 lb 11.3 oz (92.4 kg)   SpO2 91%   BMI 33.90 kg/m²   Head: Normocephalic, atraumatic  Sclera clear  Neck JVD flat  Lungs: normal effort of breathing    Labs:   Recent Labs     01/08/22  1100 01/09/22  0503   WBC 19.4* 20.2*   HGB 13.6 12.0   HCT 41.9 37.2    298     Recent Labs     01/08/22  1100 01/09/22  0503    136   K 3.2* 3.6   CL 99 102   CO2 23 21   BUN 9 10   CREATININE 0.47* 0.48*   CALCIUM 9.4 8.7   AST 16  --    ALT 15  --    BILITOT 0.6  --    ALKPHOS 98  --      No results for input(s): INR in the last 72 hours. Recent Labs     01/08/22  1100   TROPONINI <0.010     Radiology:  XR FOOT RIGHT (MIN 3 VIEWS)   Final Result      ACUTE FRACTURES OF THE SECOND THROUGH FOURTH (AND PROBABLY FIFTH) METATARSALS, AS NOTED. XR CHEST PORTABLE   Final Result      INFERIOR VENA CAVA APPROACH PACER LEADS IN EXPECTED POSITION. POOR INSPIRATION WITH MILD LEFT BASILAR INFILTRATE/ATELECTASIS. BRONCHOPNEUMONIA SHOULD BE EXCLUDED CLINICALLY. XR ANKLE RIGHT (MIN 3 VIEWS)   Final Result      INCOMPLETELY VISUALIZED METATARSAL FRACTURES, AS NOTED. DEDICATED RIGHT FOOT RADIOGRAPHS ARE SUGGESTED. NONDISPLACED MEDIAL MALLEOLAR FRACTURE. The findings were discussed with the patient's ICU nurse at approximately 7:28 AM on 1/9/2022. CT HEAD WO CONTRAST   Final Result      NO ACUTE INTRACRANIAL PROCESS OR SIGNIFICANT CHANGE FROM PRIOR STUDIES IDENTIFIED. XR CHEST PORTABLE   Final Result   NO ACUTE CARDIOPULMONARY DISEASE. Assessment/Plan:    Covid positive without hypoxia. No obvious evidence of pneumonia on chest x-ray. No active symptoms. Patient does complain of weakness and dizziness which may be secondary to Covid however given overall clinical picture is most likely secondary to the patient's substantial bradycardia of 20-30 heart rate. Patient is vaccinated with 2 doses. Ideally should receive a third dose after acute phase of inpatient care has resolved. Continue to monitor vital signs, oxygenation status. No indication for further treatment at this time. Bradycardia and cardiac pause being primarily managed by cardiology. Temporary pacemaker present.   During the time of evaluating patient, backup pacing was occurring intermittently    35 minutes total care time, >1/2 in unit/floor time and care coordination     Cade Benz MD

## 2022-01-09 NOTE — CARE COORDINATION
Daily Update 1/9    From:HOME INDEPENDENT, NO HOME 02 HAS C-PAP DOES NOT WEAR    PMH : HTN, HLP OVERACTIVE BLADDER CURRENT SICK SINUS SYNDROME(HR 20-30'S)W DOPAMINE COVID INF    Anticipated Discharge Date:TBD    Anticipated Discharge Disposition RETURN HOME    Patient Mobility or PT/OT ordered: WILL NEED ORDERED AFTER CLEARED BY ORTHO    Covid Test Date and Result:1/8 COVID ++++    Barriers to Discharge:  K+ 3.2--3.6  3L--SR  ? ? REMDESIVIR   1/8 TEMP PACEMAKER   TOLERATING CLEARS   ANKLE PAIN 2' TO FALL 1/8>>ORTHO CONSULT    Readmission Risk              Risk of Unplanned Readmission:  7       CMI/ ACP DONE      Omkar Ramsay RN  January 9, 2022

## 2022-01-09 NOTE — CONSULTS
Consult Note  Patient: Akshat Longoria  Unit/Bed: IC17/IC17-01  YOB: 1940  MRN: [id number]  Acct: [de-identified]   Admitting Diagnosis: Dehydration [E86.0]  Sick sinus syndrome ([address] [I49.5]  Symptomatic bradycardia [R00.1]  Closed fracture of right ankle, initial encounter [Z20.958F]  COVID-19 virus infection [U07.1]  Date:  1/8/2022  Hospital Day: 1    Complaint:  Right foot and ankle pain    History of Present Illness:  52-year-old female in with multiple medical conditions was complaining of swelling and pain in her foot and ankle had some x-rays of the ankle which showed fracture orthopedic consulted for management. At this point patient is on bedrest due to her recent pacemaker. Does have a fair amount of swelling and tenderness in the foot and ankle particular on the medial malleolus and the metatarsals on the lateral side.     PMHx:  Past Medical History:   Diagnosis Date    High blood pressure     HIGH CHOLESTEROL     Overactive bladder     Thyroid disease        PSHx:  Past Surgical History:   Procedure Laterality Date    CHOLECYSTECTOMY      EYE SURGERY Bilateral     cataract    HYSTERECTOMY      NERVE SURGERY N/A 7/28/2021    STAGE 1 & STAGE 2 INTERSTIM performed by Nghia Hernandez MD at [place] [address] Hx:  Social History     Socioeconomic History    Marital status:      Spouse name: None    Number of children: None    Years of education: None    Highest education level: None   Occupational History    None   Tobacco Use    Smoking status: Never Smoker    Smokeless tobacco: Never Used   Substance and Sexual Activity    Alcohol use: Yes     Comment: occasionally    Drug use: No    Sexual activity: None   Other Topics Concern    None   Social History Narrative    None     Social Determinants of Health     Financial Resource Strain:     Difficulty of Paying Living Expenses: Not on file   Food Insecurity:     Worried About Running Out of Food in the Last Year: Not on file    Ran Out of Food in the Last Year: Not on file   Transportation Needs:     Lack of Transportation (Medical): Not on file    Lack of Transportation (Non-Medical): Not on file   Physical Activity:     Days of Exercise per Week: Not on file    Minutes of Exercise per Session: Not on file   Stress:     Feeling of Stress : Not on file   Social Connections:     Frequency of Communication with Friends and Family: Not on file    Frequency of Social Gatherings with Friends and Family: Not on file    Attends [employer] Services: Not on file    Active Member of [address] [place] [address] [employer] or Organizations: Not on file    Attends Club or Organization Meetings: Not on file    Marital Status: Not on file   Intimate Partner Violence:     Fear of Current or Ex-Partner: Not on file    Emotionally Abused: Not on file    Physically Abused: Not on file    Sexually Abused: Not on file   Housing Stability:     Unable to Pay for Housing in the Last Year: Not on file    Number of [place] in the Last Year: Not on file    Unstable Housing in the Last Year: Not on file       Family Hx:  History reviewed. No pertinent family history. Review of Systems:   Review of Systems  Reviewed per the medical note. Physical Examination:    /63   Pulse 90   Temp 98.4 °F (36.9 °C) (Oral)   Resp 21   Ht 5' 5\" (1.651 m)   Wt 203 lb 11.3 oz (92.4 kg)   SpO2 96%   BMI 33.90 kg/m²    Physical Exam  Resting comfortably in bed  Limited right lower extremity examination shows tenderness along the medial malleolus without obvious deformity limited range of motion due to pain mild swelling tenderness along the lateral aspect of the midfoot. Compartments are soft neurologically intact distally.   LABS:  CBC:   Lab Results   Component Value Date    WBC 20.2 01/09/2022    RBC 4.56 01/09/2022    RBC 4.01 01/13/2012    HGB 12.0 01/09/2022    HCT 37.2 01/09/2022    MCV 81.6 01/09/2022    MCH 26.4 01/09/2022    MCHC 32.3 01/09/2022

## 2022-01-10 LAB
ANION GAP SERPL CALCULATED.3IONS-SCNC: 14 MEQ/L (ref 9–15)
BASOPHILS ABSOLUTE: 0.1 K/UL (ref 0–0.2)
BASOPHILS RELATIVE PERCENT: 0.5 %
BUN BLDV-MCNC: 11 MG/DL (ref 8–23)
CALCIUM SERPL-MCNC: 8.8 MG/DL (ref 8.5–9.9)
CHLORIDE BLD-SCNC: 101 MEQ/L (ref 95–107)
CO2: 19 MEQ/L (ref 20–31)
CREAT SERPL-MCNC: 0.42 MG/DL (ref 0.5–0.9)
EKG ATRIAL RATE: 52 BPM
EKG ATRIAL RATE: 77 BPM
EKG P AXIS: 100 DEGREES
EKG P-R INTERVAL: 302 MS
EKG Q-T INTERVAL: 392 MS
EKG Q-T INTERVAL: 452 MS
EKG QRS DURATION: 108 MS
EKG QRS DURATION: 114 MS
EKG QTC CALCULATION (BAZETT): 443 MS
EKG QTC CALCULATION (BAZETT): 473 MS
EKG R AXIS: -25 DEGREES
EKG R AXIS: -35 DEGREES
EKG T AXIS: 104 DEGREES
EKG T AXIS: 132 DEGREES
EKG VENTRICULAR RATE: 66 BPM
EKG VENTRICULAR RATE: 77 BPM
EOSINOPHILS ABSOLUTE: 0.3 K/UL (ref 0–0.7)
EOSINOPHILS RELATIVE PERCENT: 1.7 %
GFR AFRICAN AMERICAN: >60
GFR NON-AFRICAN AMERICAN: >60
GLUCOSE BLD-MCNC: 100 MG/DL (ref 70–99)
HCT VFR BLD CALC: 36 % (ref 37–47)
HEMOGLOBIN: 11.8 G/DL (ref 12–16)
LYMPHOCYTES ABSOLUTE: 1.5 K/UL (ref 1–4.8)
LYMPHOCYTES RELATIVE PERCENT: 9.6 %
MAGNESIUM: 2 MG/DL (ref 1.7–2.4)
MCH RBC QN AUTO: 26.9 PG (ref 27–31.3)
MCHC RBC AUTO-ENTMCNC: 32.9 % (ref 33–37)
MCV RBC AUTO: 81.8 FL (ref 82–100)
MONOCYTES ABSOLUTE: 1.2 K/UL (ref 0.2–0.8)
MONOCYTES RELATIVE PERCENT: 8.1 %
NEUTROPHILS ABSOLUTE: 12.3 K/UL (ref 1.4–6.5)
NEUTROPHILS RELATIVE PERCENT: 80.1 %
PDW BLD-RTO: 14.3 % (ref 11.5–14.5)
PLATELET # BLD: 264 K/UL (ref 130–400)
POTASSIUM SERPL-SCNC: 3.7 MEQ/L (ref 3.4–4.9)
PROCALCITONIN: 0.16 NG/ML (ref 0–0.15)
RBC # BLD: 4.41 M/UL (ref 4.2–5.4)
SODIUM BLD-SCNC: 134 MEQ/L (ref 135–144)
WBC # BLD: 15.3 K/UL (ref 4.8–10.8)

## 2022-01-10 PROCEDURE — 36415 COLL VENOUS BLD VENIPUNCTURE: CPT

## 2022-01-10 PROCEDURE — 80048 BASIC METABOLIC PNL TOTAL CA: CPT

## 2022-01-10 PROCEDURE — 85025 COMPLETE CBC W/AUTO DIFF WBC: CPT

## 2022-01-10 PROCEDURE — 2580000003 HC RX 258: Performed by: INTERNAL MEDICINE

## 2022-01-10 PROCEDURE — 87040 BLOOD CULTURE FOR BACTERIA: CPT

## 2022-01-10 PROCEDURE — 6370000000 HC RX 637 (ALT 250 FOR IP): Performed by: INTERNAL MEDICINE

## 2022-01-10 PROCEDURE — 84145 PROCALCITONIN (PCT): CPT

## 2022-01-10 PROCEDURE — 2700000000 HC OXYGEN THERAPY PER DAY

## 2022-01-10 PROCEDURE — 99222 1ST HOSP IP/OBS MODERATE 55: CPT | Performed by: INTERNAL MEDICINE

## 2022-01-10 PROCEDURE — 99233 SBSQ HOSP IP/OBS HIGH 50: CPT | Performed by: INTERNAL MEDICINE

## 2022-01-10 PROCEDURE — 83735 ASSAY OF MAGNESIUM: CPT

## 2022-01-10 PROCEDURE — 99231 SBSQ HOSP IP/OBS SF/LOW 25: CPT | Performed by: SPECIALIST

## 2022-01-10 PROCEDURE — 2000000000 HC ICU R&B

## 2022-01-10 PROCEDURE — 6360000002 HC RX W HCPCS: Performed by: INTERNAL MEDICINE

## 2022-01-10 RX ORDER — LOSARTAN POTASSIUM 100 MG/1
100 TABLET ORAL DAILY
Status: DISCONTINUED | OUTPATIENT
Start: 2022-01-12 | End: 2022-01-16 | Stop reason: HOSPADM

## 2022-01-10 RX ADMIN — MORPHINE SULFATE 2 MG: 2 INJECTION, SOLUTION INTRAMUSCULAR; INTRAVENOUS at 12:54

## 2022-01-10 RX ADMIN — AMLODIPINE BESYLATE 5 MG: 5 TABLET ORAL at 08:35

## 2022-01-10 RX ADMIN — ACETAMINOPHEN 1000 MG: 500 TABLET ORAL at 08:51

## 2022-01-10 RX ADMIN — LOSARTAN POTASSIUM 100 MG: 100 TABLET, FILM COATED ORAL at 08:35

## 2022-01-10 RX ADMIN — LEVOTHYROXINE SODIUM 112 MCG: 112 TABLET ORAL at 06:31

## 2022-01-10 RX ADMIN — Medication 10 ML: at 08:37

## 2022-01-10 RX ADMIN — PANTOPRAZOLE SODIUM 40 MG: 40 TABLET, DELAYED RELEASE ORAL at 06:31

## 2022-01-10 ASSESSMENT — PAIN SCALES - GENERAL
PAINLEVEL_OUTOF10: 0
PAINLEVEL_OUTOF10: 0
PAINLEVEL_OUTOF10: 5
PAINLEVEL_OUTOF10: 5
PAINLEVEL_OUTOF10: 0

## 2022-01-10 ASSESSMENT — PAIN DESCRIPTION - LOCATION: LOCATION: LEG

## 2022-01-10 ASSESSMENT — PAIN DESCRIPTION - ORIENTATION: ORIENTATION: RIGHT

## 2022-01-10 NOTE — CONSULTS
Infectious Diseases Inpatient Consult Note      Reason for Consult:   COVID19/clearance for pacemaker  Requesting Physician:   Dr. Jean Welsh  Primary Care Physician:  Cherly Dubin, MD  History Obtained From:   Pt, EPIC    Admit Date: 1/8/2022  Hospital Day: 3      HISTORY OF PRESENT ILLNESS:  This is a 78 y.o. female was admitted to [facility] ICU from home through ER with 3 weeks history of cough, nasal congestion, fevers chills and body aches. 2 days prior to presentation she had nausea vomiting and dizziness. Had multiple episodes of presyncope. Was found to have long pauses of asystole. Had a temporary pacemaker placed in right groin. Patient tested positive for COVID-19 on admission. There is a plan to place a permanent pacemaker in [facility] Patient had leukocytosis since admission and there was concern for possible bacterial infection. Patient was found to have a right medial malleolar fracture and currently has a boot. Patient reports feeling much better. Decreased cough and dizziness. No fever since admission.   History of of COVID-19 vaccination  Patient was receiving Keflex 500 mg 4 times daily prior to admission for possible UTI  Past medical surgical and social history were reviewed and are as detailed below  Past Medical History:   Diagnosis Date    Abdominal aortic aneurysm without rupture (HCC)     Age-related macular degeneration     Dehydration     Esophageal reflux     Falls frequently 01/02/2022    Hearing loss     High blood pressure     HIGH CHOLESTEROL     Hyperlipidemia     Hypothyroidism     Imbalance     Incontinence     Nausea     Obesity     Orthostatic dizziness     Osteoarthrosis     Overactive bladder     Thyroid disease        Past Surgical History:   Procedure Laterality Date    CATARACT REMOVAL      CHOLECYSTECTOMY      CHOLECYSTECTOMY      EYE SURGERY Bilateral     cataract    HYSTERECTOMY      IR NEUROSTIMULATOR PLACEMENT  07/28/2021    NERVE SURGERY N/A 7/28/2021    STAGE 1 & STAGE 2 INTERSTIM performed by Romayne Albright, MD at [facility]       Current Medications:     [START ON 1/12/2022] losartan  100 mg Oral Daily    amLODIPine  5 mg Oral Daily    pantoprazole  40 mg Oral QAM AC    [Held by provider] oxybutynin  15 mg Oral Daily    levothyroxine  112 mcg Oral Daily    sodium chloride flush  5-40 mL IntraVENous 2 times per day       Allergies:  Patient has no known allergies. Social History     Socioeconomic History    Marital status:      Spouse name: Not on file    Number of children: Not on file    Years of education: Not on file    Highest education level: Not on file   Occupational History    Not on file   Tobacco Use    Smoking status: Never Smoker    Smokeless tobacco: Never Used   Substance and Sexual Activity    Alcohol use: Yes     Comment: occasionally    Drug use: No    Sexual activity: Not on file   Other Topics Concern    Not on file   Social History Narrative    Not on file     Social Determinants of Health     Financial Resource Strain:     Difficulty of Paying Living Expenses: Not on file   Food Insecurity:     Worried About Running Out of Food in the Last Year: Not on file    Suzie of Food in the Last Year: Not on file   Transportation Needs:     Lack of Transportation (Medical): Not on file    Lack of Transportation (Non-Medical):  Not on file   Physical Activity:     Days of Exercise per Week: Not on file    Minutes of Exercise per Session: Not on file   Stress:     Feeling of Stress : Not on file   Social Connections:     Frequency of Communication with Friends and Family: Not on file    Frequency of Social Gatherings with Friends and Family: Not on file    Attends Cheondoism Services: Not on file    Active Member of Clubs or Organizations: Not on file    Attends Club or Organization Meetings: Not on file    Marital Status: Not on file   Intimate Partner Violence:     Fear of Current or Ex-Partner: Not on file    Emotionally Abused: Not on file    Physically Abused: Not on file    Sexually Abused: Not on file   Housing Stability:     Unable to Pay for Housing in the Last Year: Not on file    Number of Places Lived in the Last Year: Not on file    Unstable Housing in the Last Year: Not on file         Family History:   History reviewed. No pertinent family history. Review of Systems  14 system review is negative other than HPI    Physical Exam  Vitals:    01/10/22 0800 01/10/22 0900 01/10/22 1000 01/10/22 1200   BP: (!) 148/68 (!) 170/61 (!) 152/63    Pulse: 72 81 78 60   Resp: 19 21 17 20   Temp: 98.4 °F (36.9 °C)   98.6 °F (37 °C)   TempSrc: Axillary   Axillary   SpO2: 93% 96% 93% (!) 87%   Weight:       Height:         General Appearance: alert and oriented to person, place and time,  in no acute distress, oxygen saturation is 95% on room air  Skin: warm and dry, no rash. Head: normocephalic and atraumatic  Eyes: extraocular eye movements intact, conjunctivae normal, anicteric sclerae  ENT: oropharynx clear and moist with normal mucous membranes.  No thrush  Lungs:  Clear Lungs, no rhonchi, no crackles, no wheezes  Heart: nl S1/S2, no murmur  Abdomen: soft, no tenderness, no H-S-megaly, + BS  NEUROLOGICAL: alert and oriented x 3, no focal deficits  No leg edema  No erythema, no warmth, no tenderness  Right groin temporary pacemaker  Right leg walking boot      DATA:    Lab Results   Component Value Date    WBC 20.2 (H) 01/09/2022    HGB 12.0 01/09/2022    HCT 37.2 01/09/2022    MCV 81.6 (L) 01/09/2022     01/09/2022     Lab Results   Component Value Date    CREATININE 0.42 (L) 01/10/2022    BUN 11 01/10/2022     (L) 01/10/2022    K 3.7 01/10/2022     01/10/2022    CO2 19 (L) 01/10/2022       Hepatic Function Panel:   Lab Results   Component Value Date    ALKPHOS 98 01/08/2022    ALT 15 01/08/2022    AST 16 01/08/2022    PROT 6.9 01/08/2022    BILITOT 0.6 01/08/2022    LABALBU 3.8 01/08/2022    LABALBU 4.4 12/15/2011     Procalcitonin is normal=0.05  Urinalysis is negative for leukocyte esterase  No blood cultures done during this admission    No results for input(s): PH, PO2, PCO2, HCO3, BE, O2SAT in the last 72 hours. XR ANKLE RIGHT (MIN 3 VIEWS)    Result Date: 1/9/2022  INCOMPLETELY VISUALIZED METATARSAL FRACTURES, AS NOTED. DEDICATED RIGHT FOOT RADIOGRAPHS ARE SUGGESTED. NONDISPLACED MEDIAL MALLEOLAR FRACTURE. The findings were discussed with the patient's ICU nurse at approximately 7:28 AM on 1/9/2022. XR FOOT RIGHT (MIN 3 VIEWS)    Result Date: 1/9/2022  ACUTE FRACTURES OF THE SECOND THROUGH FOURTH (AND PROBABLY FIFTH) METATARSALS, AS NOTED. CT HEAD WO CONTRAST    Result Date: 1/8/2022  NO ACUTE INTRACRANIAL PROCESS OR SIGNIFICANT CHANGE FROM PRIOR STUDIES IDENTIFIED. XR CHEST PORTABLE    Result Date: 1/9/2022  INFERIOR VENA CAVA APPROACH PACER LEADS IN EXPECTED POSITION. POOR INSPIRATION WITH MILD LEFT BASILAR INFILTRATE/ATELECTASIS. BRONCHOPNEUMONIA SHOULD BE EXCLUDED CLINICALLY. XR CHEST PORTABLE    Result Date: 1/8/2022  NO ACUTE CARDIOPULMONARY DISEASE.          IMPRESSION:    · COVID-19 infection, no evidence of bacterial pneumonia with normal procalcitonin  · Long pauses and asystole, symptomatic  · Leukocytosis on admission, rule out bacteremia    Patient Active Problem List   Diagnosis    Immune thrombocytopenic purpura (HCC)    Thrombocytopenia, unspecified (HCC)    Essential (primary) hypertension    Urge incontinence of urine    Fecal smearing    Other specified soft tissue disorders    Abdominal aortic aneurysm, without rupture (HCC)    Abnormal auditory perception of both ears    Age-related macular degeneration, dry, both eyes    Benign neoplasm of colon    Cellulitis    Decreased ROM of neck    Diarrhea    Orthostatic dizziness    Edema of both legs    Esophageal reflux    Generalized osteoarthrosis, unspecified site    Hypothyroid  Imbalance    Injury of tendon of rotator cuff    Mixed hyperlipidemia    Myogenic ptosis of eyelid of both eyes    Nausea alone    Open angle with borderline findings, low risk, bilateral    Overweight and obesity    Ptosis of both eyelids    PVD (posterior vitreous detachment), both eyes    Sensorineural hearing loss of both ears    Tinnitus, bilateral    Vitreous degeneration    Dehydration    COVID-19 virus infection    Sick sinus syndrome (HCC)    Symptomatic bradycardia    Syncope and collapse       PLAN:  · There is no indication for Remdesivir at this point because of lack of hypoxia/ infiltrates  · Monitor O2   · Blood cultures today  · Patient may go for permanent pacemaker    Discussed with patient and Dr. Deidre Aranda MD

## 2022-01-10 NOTE — PROGRESS NOTES
CARDIOLOGY [address]  [facility] PROGRESS NOTE         1/10/2022      Dee Garcia    [id number]  1940    Rounding MD: Yaya De La Cruz MD ,[facility]    Primary Cardiologist:  Rosas Medina MD NPH [facility]      SUBJECTIVE:    Notes reviewed. Discussed with Nursing. CHB with TPM wire placed for asystole and syncope;  COVID positive in isolation. Pending ID consult and clearance fo Permanent PaceMaker. Discussed with Dr Mark Cadena, planning on possible implant tomorrow. In SR on telemety. No new complaints. No CP or SOB. Nurse states has foot fracture, orthopedic consulted. Cardiac and general ROS otherwise negative and unchanged. ASSESSMENT:    1. Syncope  2. Asystole, CHB - Post TPM, Pending PPM  3. Recurrent falls  4. Lower extremity fractures post casting. 5. Accelerated junctional rhythm by Holter monitor 11 2019.  6. Abnormal resting electrocardiogram (first-degree AV block, poor with ventricular progression, left axis deviation). 7. Negative cardiac catheterization for significant coronary artery disease 12/6/2019.  8. Abnormal Lexiscan Myoview perfusion stress test for mild anterior lateral apical ischemia, November 2019.  9. Abdominal aortic aneurysm, newly diagnosed, 4.5 cm, none by ultrasound 5/2021. 10. Prior negative stress test 1996. 11. Prior negative echocardiogram 1996 and November 2019. 12. Negative carotid ultrasound for significant carotid artery disease, ICAs less than 50%, November 2019. 13. History of varicose veins. 14. Obstructive sleep apnea not on CPAP. 15. Hypertension  16. Hyperlipidemia  17. Lymphedema  18. Peptic ulcer diseaseDegenerative joint disease with bilateral knee discomfort  19. Prior hysterectomy  20. Prior cholecystectomy  21. Family history of cardiovascular (congestive heart failure, coronary bypass, pacemaker). 22. COVID POSITIVE STATUS      PLAN:    Supportive Cardiac Care. ID and General Medical care  Orthopedic consult.   GI evaluation and treatment  TPM for now with PPM with Dr Petra Watt once cleared by ID, hopefully tomorrow. Lovenox or Heparin coverage per others. Serial labs. ECGs Telemetry  Further orders tocome  See orders. OBJECTIVE:     MEDICATIONS:     Scheduled Meds:   amLODIPine  5 mg Oral Daily    losartan  100 mg Oral Daily    pantoprazole  40 mg Oral QAM AC    [Held by provider] oxybutynin  15 mg Oral Daily    levothyroxine  112 mcg Oral Daily    sodium chloride flush  5-40 mL IntraVENous 2 times per day     Continuous Infusions:   DOPamine 2.5 mcg/kg/min (01/08/22 1425)    sodium chloride       PRN Meds:acetaminophen, hydrALAZINE, sodium chloride flush, sodium chloride, ondansetron, morphine **OR** morphine    PHYSICAL EXAM:    CURRENT VITALS: BP (!) 170/61   Pulse 81   Temp 98.4 °F (36.9 °C) (Axillary)   Resp 21   Ht 5' 5\" (1.651 m)   Wt 203 lb 11.3 oz (92.4 kg)   SpO2 96%   BMI 33.90 kg/m²     Not Formally / directly examined due to COVID protocol. Discussed with staff. Data:       LABS:  Recent Results (from the past 24 hour(s))   Basic Metabolic Panel    Collection Time: 01/10/22  5:40 AM   Result Value Ref Range    Sodium 134 (L) 135 - 144 mEq/L    Potassium 3.7 3.4 - 4.9 mEq/L    Chloride 101 95 - 107 mEq/L    CO2 19 (L) 20 - 31 mEq/L    Anion Gap 14 9 - 15 mEq/L    Glucose 100 (H) 70 - 99 mg/dL    BUN 11 8 - 23 mg/dL    CREATININE 0.42 (L) 0.50 - 0.90 mg/dL    GFR Non-African American >60.0 >60    GFR  >60.0 >60    Calcium 8.8 8.5 - 9.9 mg/dL   Magnesium    Collection Time: 01/10/22  5:40 AM   Result Value Ref Range    Magnesium 2.0 1.7 - 2.4 mg/dL   PROCALCITONIN    Collection Time: 01/10/22  5:40 AM   Result Value Ref Range    Procalcitonin 0.16 (H) 0.00 - 0.15 ng/mL       Cardiac Cath  \12/2019  1.  Normal cardiac catheterization. 2.  Normal coronary cineangiography without evidence of significant coronary artery disease as noted above.   4.  Normal LV Function, LVEF 60%  4.  Normal hemodynamics. 5.  No significant valvular heart disease. EKG:   Sinus rhythm with 1st degree AV block, rate 77.    Incomplete left bundle branch block   Left ventricular hypertrophy with repolarization abnormality   Abnormal ECG       Echo: See Report            Fredrick Francois MD ,[facility]

## 2022-01-10 NOTE — PROGRESS NOTES
Hospitalist Progress Note      Date of Admission: 1/8/2022  Chief Complaint:    Chief Complaint   Patient presents with    Dizziness    Emesis     Subjective:  No new complaints. No LH, dizziness. Mild sinus congestion. Medications:    Infusion Medications    DOPamine 2.5 mcg/kg/min (01/08/22 1425)    sodium chloride       Scheduled Medications    [START ON 1/12/2022] losartan  100 mg Oral Daily    amLODIPine  5 mg Oral Daily    pantoprazole  40 mg Oral QAM AC    [Held by provider] oxybutynin  15 mg Oral Daily    levothyroxine  112 mcg Oral Daily    sodium chloride flush  5-40 mL IntraVENous 2 times per day     PRN Meds: acetaminophen, hydrALAZINE, sodium chloride flush, sodium chloride, ondansetron, morphine **OR** morphine    Intake/Output Summary (Last 24 hours) at 1/10/2022 1351  Last data filed at 1/9/2022 1700  Gross per 24 hour   Intake 120 ml   Output 450 ml   Net -330 ml     Exam:  BP (!) 152/63   Pulse 60   Temp 98.6 °F (37 °C) (Axillary)   Resp 20   Ht 5' 5\" (1.651 m)   Wt 203 lb 11.3 oz (92.4 kg)   SpO2 (!) 87%   BMI 33.90 kg/m²   Head: Normocephalic, atraumatic  Sclera clear  Neck JVD flat  Lungs: normal effort of breathing    Labs:   Recent Labs     01/08/22  1100 01/09/22  0503   WBC 19.4* 20.2*   HGB 13.6 12.0   HCT 41.9 37.2    298     Recent Labs     01/08/22  1100 01/09/22  0503 01/10/22  0540    136 134*   K 3.2* 3.6 3.7   CL 99 102 101   CO2 23 21 19*   BUN 9 10 11   CREATININE 0.47* 0.48* 0.42*   CALCIUM 9.4 8.7 8.8   AST 16  --   --    ALT 15  --   --    BILITOT 0.6  --   --    ALKPHOS 98  --   --      No results for input(s): INR in the last 72 hours. Recent Labs     01/08/22  1100   TROPONINI <0.010     Radiology:  XR FOOT RIGHT (MIN 3 VIEWS)   Final Result      ACUTE FRACTURES OF THE SECOND THROUGH FOURTH (AND PROBABLY FIFTH) METATARSALS, AS NOTED.                XR CHEST PORTABLE   Final Result      INFERIOR VENA CAVA APPROACH PACER LEADS IN EXPECTED POSITION. POOR INSPIRATION WITH MILD LEFT BASILAR INFILTRATE/ATELECTASIS. BRONCHOPNEUMONIA SHOULD BE EXCLUDED CLINICALLY. XR ANKLE RIGHT (MIN 3 VIEWS)   Final Result      INCOMPLETELY VISUALIZED METATARSAL FRACTURES, AS NOTED. DEDICATED RIGHT FOOT RADIOGRAPHS ARE SUGGESTED. NONDISPLACED MEDIAL MALLEOLAR FRACTURE. The findings were discussed with the patient's ICU nurse at approximately 7:28 AM on 1/9/2022. CT HEAD WO CONTRAST   Final Result      NO ACUTE INTRACRANIAL PROCESS OR SIGNIFICANT CHANGE FROM PRIOR STUDIES IDENTIFIED. XR CHEST PORTABLE   Final Result   NO ACUTE CARDIOPULMONARY DISEASE. Assessment/Plan:    SSS with sinus pause: plan of pacemaker 1/11. From my standpoint, patient is acceptable risk for surgery. Leukocytosis is related to stress of fracture. No signs of active bacterial infection. ID consulted by EP for clearance. -recommend to hold ARB on day of surgery    Covid positive without hypoxia. Vaccinated but not boosted. Does not require further tx at this time.     R ankle and metatarsal fx: management per Ortho    35 minutes total care time, >1/2 in unit/floor time and care coordination     Yareli Henriquez DO

## 2022-01-10 NOTE — PROGRESS NOTES
Subsequent Care Progress Note        Patient:  Guerita Oconnor  Unit/Bed:  IC17/IC17-01  YOB: 1940  MRN:   [id number]  Admit Date:   1/8/2022  Hospital Day:  2    Managing Cardiologist:  Dr. Mayur Ceja     Interval Changes to HPI:     Subjective Complaint:   Denies any chest pain with exertion or at rest, palpitations, syncope, or edema. .     Physical Examination:     BP (!) 152/63   Pulse 60   Temp 98.6 °F (37 °C) (Axillary)   Resp 20   Ht 5' 5\" (1.651 m)   Wt 203 lb 11.3 oz (92.4 kg)   SpO2 (!) 87%   BMI 33.90 kg/m²       Intake/Output Summary (Last 24 hours) at 1/10/2022 1347  Last data filed at 1/9/2022 1700  Gross per 24 hour   Intake 120 ml   Output 450 ml   Net -330 ml            Guerita Oconnor examined at bedside in alert and oriented times 3. Focused exam reveals:     Cardiac: Heart regular rate and rhythm     Lungs:  clear to auscultation bilaterally- no wheezes, rales or rhonchi, normal air movement, no respiratory distress    Extremities:   negative    Telemetry:    normal sinus  with Venrticular paced rhythm,     Current Medications:   I have reviewed the patient's medications; see Medication Reconciliation. LABS:   CBC:   Recent Labs     01/08/22  1100 01/09/22  0503   WBC 19.4* 20.2*   HGB 13.6 12.0    298      BMP:    Recent Labs     01/08/22  1100 01/09/22  0503 01/10/22  0540    136 134*   K 3.2* 3.6 3.7   CL 99 102 101   CO2 23 21 19*   BUN 9 10 11   CREATININE 0.47* 0.48* 0.42*   GLUCOSE 172* 129* 100*              Troponin: No results for input(s): TROPONINT in the last 72 hours. BNP:   Recent Labs     01/08/22  1100   PROBNP 459      INR: No results for input(s): INR in the last 72 hours. Mg:   Recent Labs     01/10/22  0540   MG 2.0       Cardiac Testing:    none    CLINICAL IMPRESSION:  1. COVID associated pneumonia.   2.  Significant pauses seen on telemetry associated with syncope - sinus  node dysfunction. 3.  History of hypertension. 4.  History of hyperlipidemia. 5.  Normal coronary artery with cardiac catheterization in 2018. 6.  Normal left ventricular function with echocardiogram as described  above. 7.  History of bradycardia with junctional rhythm by Holter monitors in  the past.     PLAN AND RECOMMENDATIONS:    For pacemaker in am tomorrow. Procedure, risk, benefits and possible complications explained to patient and family.  All questions were answered  Appreciate ID recommendations  Will remove temporary pacer wire at the time of the permanent pacer implantation  Continue with Dopamine    Norma Gonzalez MD        Electronically signed by Norma Gonzalez MD on 1/10/2022 at 1:47 PM

## 2022-01-10 NOTE — PROGRESS NOTES
0700: Assumed care of patient from [facility] AND [facility]. 0800: Assessment completed see NPR, morning meds given see MAR for details. Breakfast given to patient. HOB remained at or below 30 degrees due to temporary pacemaker. Patient placed in reverse trendelenberg position to allow patient to sit up to eat. No choking noted at this time. Medication given for headache. Patient denies nausea, need for further assistance at this time. Bed low and locked. 1542: This nurse confirming w lab that stat blood cultures were collected and received by lab.

## 2022-01-10 NOTE — PROGRESS NOTES
George Butler is a 78 y.o. female patient. Current Facility-Administered Medications   Medication Dose Route Frequency Provider Last Rate Last Admin    [START ON 1/12/2022] losartan (COZAAR) tablet 100 mg  100 mg Oral Daily Jeff Asencio DO        acetaminophen (TYLENOL) tablet 1,000 mg  1,000 mg Oral Q6H PRN Jason Burnham MD   1,000 mg at 01/10/22 0851    amLODIPine (NORVASC) tablet 5 mg  5 mg Oral Daily Jason Burnham MD   5 mg at 01/10/22 0835    pantoprazole (PROTONIX) tablet 40 mg  40 mg Oral QAM AC Jason Burnham MD   40 mg at 01/10/22 0631    [Held by provider] oxybutynin (DITROPAN-XL) extended release tablet 15 mg  15 mg Oral Daily Jason Burnham MD        levothyroxine (SYNTHROID) tablet 112 mcg  112 mcg Oral Daily Jason Burnham MD   112 mcg at 01/10/22 0631    DOPamine (INTROPIN) 400 mg in dextrose 5 % 250 mL infusion  2.5 mcg/kg/min IntraVENous Continuous Jason Burnham MD 8.5 mL/hr at 01/08/22 1425 2.5 mcg/kg/min at 01/08/22 1425    hydrALAZINE (APRESOLINE) injection 10 mg  10 mg IntraVENous Q4H PRN Jason Burnham MD   10 mg at 01/09/22 0603    sodium chloride flush 0.9 % injection 5-40 mL  5-40 mL IntraVENous 2 times per day Nicola Bowen MD   10 mL at 01/10/22 0837    sodium chloride flush 0.9 % injection 5-40 mL  5-40 mL IntraVENous PRN Nicola Bowen MD        0.9 % sodium chloride infusion  25 mL IntraVENous PRN Nicola Bowen MD        ondansetron [facility]) injection 4 mg  4 mg IntraVENous Q6H PRN Nicola Bowen MD   4 mg at 01/09/22 2213    morphine (PF) injection 2 mg  2 mg IntraVENous Q2H PRN Nicola Bowen MD   2 mg at 01/10/22 1254    Or    morphine sulfate (PF) injection 4 mg  4 mg IntraVENous Q2H PRN Nicola Bowen MD         No Known Allergies  Active Problems:    COVID-19 virus infection    Sick sinus syndrome (HCC)    Symptomatic bradycardia    Syncope and collapse  Resolved Problems:    * No resolved hospital problems. *    Blood pressure (!) 164/65, pulse 77, temperature 98 °F (36.7 °C), temperature source Axillary, resp. rate 17, height 5' 5\" (1.651 m), weight 203 lb 11.3 oz (92.4 kg), SpO2 92 %.     Subjective no nausea or vomiting tolerating diet very well  Objective abdomen is soft nontender no palpable mass  Assessment & Plan no active GI issues GI shall sign off    Marquis Rikki MD  1/10/2022

## 2022-01-10 NOTE — PROGRESS NOTES
Progress Note  Patient: Christopher Soto  Unit/Bed: IC17/IC17-01  YOB: 1940  MRN: [id number]  Acct: [de-identified]   Admitting Diagnosis: Dehydration [E86.0]  Sick sinus syndrome [facility]) [I49.5]  Symptomatic bradycardia [R00.1]  Closed fracture of right ankle, initial encounter [S12.913Q]  COVID-19 virus infection [U07.1]  Date:  1/8/2022  Hospital Day: 2    Chief Complaint:  Right ankle fracture     Subjective  Reports mild pain to right foot with no pain radiation. Patient has been on bed rest and has not yet ambulated on the right lower extremity. Physical Examination:    BP (!) 170/61   Pulse 81   Temp 98.4 °F (36.9 °C) (Axillary)   Resp 21   Ht 5' 5\" (1.651 m)   Wt 203 lb 11.3 oz (92.4 kg)   SpO2 96%   BMI 33.90 kg/m²    Physical Exam    Patient awake and resting comfortably in bed. Decreased ROM to right lower extremity secondary to to pain. Splint removed and compartments are soft and right lower extremity neurovascular intact.        LABS:  CBC:   Lab Results   Component Value Date    WBC 20.2 01/09/2022    RBC 4.56 01/09/2022    RBC 4.01 01/13/2012    HGB 12.0 01/09/2022    HCT 37.2 01/09/2022    MCV 81.6 01/09/2022    MCH 26.4 01/09/2022    MCHC 32.3 01/09/2022    RDW 14.2 01/09/2022     01/09/2022    MPV 8.2 01/13/2012     CBC with Differential:   Lab Results   Component Value Date    WBC 20.2 01/09/2022    RBC 4.56 01/09/2022    RBC 4.01 01/13/2012    HGB 12.0 01/09/2022    HCT 37.2 01/09/2022     01/09/2022    MCV 81.6 01/09/2022    MCH 26.4 01/09/2022    MCHC 32.3 01/09/2022    RDW 14.2 01/09/2022    LYMPHOPCT 9.2 01/08/2022    MONOPCT 5.9 01/08/2022    EOSPCT 3.5 12/15/2011    BASOPCT 0.3 01/08/2022    MONOSABS 1.1 01/08/2022    LYMPHSABS 1.8 01/08/2022    EOSABS 0.0 01/08/2022    BASOSABS 0.1 01/08/2022     CMP:    Lab Results   Component Value Date     01/10/2022    K 3.7 01/10/2022     01/10/2022    CO2 19 01/10/2022    BUN 11 01/10/2022 CREATININE 0.42 01/10/2022    GFRAA >60.0 01/10/2022    LABGLOM >60.0 01/10/2022    GLUCOSE 100 01/10/2022    GLUCOSE 89 12/03/2019    PROT 6.9 01/08/2022    LABALBU 3.8 01/08/2022    LABALBU 4.4 12/15/2011    CALCIUM 8.8 01/10/2022    BILITOT 0.6 01/08/2022    ALKPHOS 98 01/08/2022    AST 16 01/08/2022    ALT 15 01/08/2022     BMP:    Lab Results   Component Value Date     01/10/2022    K 3.7 01/10/2022     01/10/2022    CO2 19 01/10/2022    BUN 11 01/10/2022    LABALBU 3.8 01/08/2022    LABALBU 4.4 12/15/2011    CREATININE 0.42 01/10/2022    CALCIUM 8.8 01/10/2022    GFRAA >60.0 01/10/2022    LABGLOM >60.0 01/10/2022    GLUCOSE 100 01/10/2022    GLUCOSE 89 12/03/2019     Magnesium:    Lab Results   Component Value Date    MG 2.0 01/10/2022    MG 2.2 12/15/2011     Troponin:    Lab Results   Component Value Date    TROPONINI <0.010 01/08/2022     XR FOOT RIGHT (MIN 3 VIEWS) : 1/9/2022       CLINICAL HISTORY:  fx metatarsal .       COMPARISON: Right ankle radiographs 1/8/2022.       TECHNIQUE: AP, lateral, and oblique radiographs of the right foot were obtained.           FINDINGS:        A minimally comminuted fracture of the proximal third of the right third metatarsal is displaced approximately 5 to 6 mm with nearly 1 cm of overriding and foreshortening.       An oblique fracture of the distal third of the right second metatarsal is present, with approximately 3 to 4 mm of lateral displacement.       There is a nondisplaced fracture of the base of the right fourth metatarsal (and probably fifth metatarsal).       A nondisplaced medial malleolar fracture appears unchanged.                   Impression       ACUTE FRACTURES OF THE SECOND THROUGH FOURTH (AND PROBABLY FIFTH) METATARSALS, AS NOTED.               Assessment:    Active Hospital Problems    Diagnosis Date Noted    COVID-19 virus infection [U07.1]     Sick sinus syndrome ([address] [I49.5]     Symptomatic bradycardia [R00.1]     Syncope and collapse [R55]            Plan:  PT/OT: patient ok for PT/OT when cleared from other specialities. She may wbat to right lower extremity in walking boot. Patient should remain in boot with exception of skin care and hygiene. She may have boot off while in bed  Follow up in outpatient orthopedic clinic in 2-3 weeks.    Electronically signedby Milana Nelson, NORM - CNP on 1/10/2022 at 12:37 PM

## 2022-01-10 NOTE — PROGRESS NOTES
PHARMACY NOTE:   ICU Rounds Attended (10-15 minutes in patient room):    Pt diagnosis: COVID    IV Fluids: none   Renal:   Recent Labs     01/08/22  1100 01/09/22  0503 01/10/22  0540   CREATININE 0.47* 0.48* 0.42*    Estimated Creatinine Clearance: 114 mL/min (A) (based on SCr of 0.42 mg/dL (L)). Antimicrobial Therapy:   none    Recent Labs     01/08/22  1100 01/09/22  0503   WBC 19.4* 20.2*         Pressors:   Dopamine-OFF per RN on rounds    Insulin Therapy (goal: 140-180): NONE    Recent Labs     01/08/22  1100 01/09/22  0503 01/10/22  0540   GLUCOSE 172* 129* 100*       Steroid Therapy: NONE  Stress Ulcer Prophylaxis:   Pantoprazole     DVT Prophylaxis/Anticoagulant Therapy: NONE  Recent Labs     01/08/22  1100 01/09/22  0503    298     No results for input(s): INR in the last 72 hours. Bowel Regimen:   NONE      Follow up/Changes:   No therapy for covid: ID consulted. Keep dopamine drip on profile per Dr. Tangela Echevarria. Follow up bowel agents, DVT prophylaxis, steroids. Per notation anticoagulation once ok with cardio and GI.      Santi Pacheco [facility] [place] [facility], PharmD   1/10/2022 11:18 AM

## 2022-01-10 NOTE — CONSULTS
[place] [address]                      [facility], [address] [facility]                                  CONSULTATION    PATIENT NAME: Sg Wild                 :        1940  MED REC NO:   [id number]                            ROOM:       IC17  ACCOUNT NO:   [de-identified]                           ADMIT DATE: 2022  PROVIDER:     Tanner Mesa MD    CONSULT DATE:  2022    This is a consultation requested by Dr. Morgan Adams. REASON FOR CONSULTATION:  Syncope. HISTORY OF PRESENT ILLNESS:  The patient is an 47-year-old female  followed by [facility]. She has a history of bradycardia  and also recurrent falling episodes that apparently per the patient's  family members, she has been having episodes of falling due to loss of  balance and also questionable syncope. In her note from primary  cardiology team, she has a history of accelerated junctional rhythm by  Holter monitor in 2019 and also negative cardiac catheterization for  significant coronary artery disease in 2019. The patient has a normal  left ventricular function per echocardiogram in 2019. History of  hypertension, hyperlipidemia. Apparently, the patient has not been  feeling well for the last two to three weeks due to cold flu symptoms. She started noticing feeling tired and fatigued during her daily  activities. She had an episode of syncope while she was in the kitchen  a couple of days ago and she was found herself on the ground. Later,  she had another questionable syncope and she was transferred to  emergency department for an evaluation. There is no clear documentation  in the chart, _____ and also discussing case with medical staff, the  patient had at least four episodes of pauses seen on telemetry up to 15  seconds of duration associated with syncope. All these episodes were  associated with nausea.   The patient was placed on dopamine with  continued showing episodes of significant pauses. A temporary pacing  wire has been placed overnight. Since then, she has been doing well. Her nausea is much better controlled. She also was diagnosed with COVID  associated infection during this admission. PAST MEDICAL HISTORY:  As described above. FAMILY HISTORY:  Noncontributory. Positive for CVA, congestive heart  failure, diabetes mellitus. SOCIAL HISTORY:  Never a smoker. No alcohol or illicit drugs. MEDICATIONS:  Please see MAR. The patient is not using any AV madhavi  blocking agents. ALLERGIES:  No known drug allergies. REVIEW OF SYSTEMS:  Per HPI, the rest of them were reviewed and they  were negative. PHYSICAL EXAMINATION:  GENERAL:  The patient was awake, follow commands. VITAL SIGNS:  Heart rate 69 beats per minute, blood pressure of 158/82  mmHg, respiratory rate of 18. HEENT:  Head normocephalic. HEENT normal.  NECK:  Supple. No JVD. LUNGS:  Bilateral rales. CARDIOVASCULAR:  Regular rate and rhythm. No murmurs or gallops. ABDOMEN:  Soft. Bowel sounds present. EXTREMITIES:  No edema in the lower extremities. NEUROLOGIC:  The patient is alert and oriented x3. Hard of hearing. LABORATORY DATA:  Her laboratory data showed on admission sodium 136,  potassium 3.6, chloride 102, CO2 of 21, BUN of 10, creatinine 0.48, GFR  more than 60, magnesium of 2.1, glucose of 129, troponin less than 0.01  with ALT of 15, AST of 16, alkaline phosphatase of 98, bilirubin 0.6,  glucose of 129, TSH of 1.05. WBC of 20.2, hemoglobin 12.0, hematocrit  37.2, platelet count of 387,418. Initial EKG has been consistent with sinus rhythm with first-degree of  AV block and a complete left bundle-branch block at a rate of 77 beats  per minute. QRS duration of 108 milliseconds. QT corrected 143  milliseconds. On telemetry, she has been having episodes of occasional  ventricular paced rhythm. Threshold of the temporary wire at 5 Ohms.     CLINICAL IMPRESSION:  1. COVID associated pneumonia. 2.  Significant pauses seen on telemetry associated with syncope - sinus  node dysfunction. 3.  History of hypertension. 4.  History of hyperlipidemia. 5.  Normal coronary artery with cardiac catheterization in 2018. 6.  Normal left ventricular function with echocardiogram as described  above. 7.  History of bradycardia with junctional rhythm by Holter monitors in  the past.    PLAN AND RECOMMENDATIONS:  1.  I had a lengthy discussion with the patient's family members. I  will plan to follow for management of symptomatic bradycardia associated  with syncope seen on telemetry with pauses up to 15 seconds of duration. The patient now is currently on dopamine and also she is requiring  temporary pacer that is functioning appropriately. So far, no  significant pauses being seen since the temporary pacer has been placed. The patient will benefit via dual chamber pacemaker implantation. We  will request an echocardiogram during this admission to confirm left  ventricular ejection fraction normal as discovered before. 2.  Continue with telemetry with for now. 3.  The patient needs to be cleared by Infectious Disease or primary  care physician due to significant WBC elevation prior to pacemaker  implantation. 4.  We will continue to follow her during this admission.         Nuzhat Brown MD    D: 01/09/2022 15:28:35       T: 01/09/2022 20:43:31     [email or web address]  Job#: [id number]     Doc#: [id number]    CC:

## 2022-01-10 NOTE — PROGRESS NOTES
Critical Care Progress Note    1/10/2022 7:55 AM    Subjective:   Admit Date: 2022  PCP: Lendel Dandy, MD    Chief Complaint   Patient presents with    Dizziness    Emesis     Interval History: Continues to be in isolation. Has minimal respiratory symptoms. Heart rate has been stable. Did not require dopamine overnight. Transvenous pacer is in place. 14 points review of systems has been obtained and negative except to was mentioned in HPI.      Medications:   Scheduled Meds:   amLODIPine  5 mg Oral Daily    losartan  100 mg Oral Daily    pantoprazole  40 mg Oral QAM AC    [Held by provider] oxybutynin  15 mg Oral Daily    levothyroxine  112 mcg Oral Daily    sodium chloride flush  5-40 mL IntraVENous 2 times per day     Continuous Infusions:   DOPamine 2.5 mcg/kg/min (22 1425)    sodium chloride           Objective:   Vitals:   Temp (24hrs), Av.2 °F (37.3 °C), Min:98.4 °F (36.9 °C), Max:100 °F (37.8 °C)    BP (!) 154/64   Pulse 73   Temp 98.9 °F (37.2 °C) (Oral)   Resp 21   Ht 5' 5\" (1.651 m)   Wt 203 lb 11.3 oz (92.4 kg)   SpO2 92%   BMI 33.90 kg/m²   I/O:24HR INTAKE/OUTPUT:      Intake/Output Summary (Last 24 hours) at 1/10/2022 0755  Last data filed at 2022 1700  Gross per 24 hour   Intake 309 ml   Output 450 ml   Net -141 ml      0701 - 01/10 0700  In: 309 [P.O.:309]  Out: 450 [Urine:450]  CVP:                 Physical Exam:  General appearance - alert, ill appearing, and in no distress  Mental status - alert, oriented to person, place, and time  Eyes - pupils equal and reactive, extraocular eye movements intact  Nose - normal and patent, no erythema, discharge or polyps  Neck - supple, no significant adenopathy  Chest - Diminished B/L clear to auscultation, no wheezes, rales or rhonchi, symmetric air entry  Heart - normal rate, regular rhythm, normal S1, S2, no murmurs, rubs, clicks or gallops  Abdomen - soft, nontender, nondistended, no masses or organomegaly  Rectal - deferred, not clinically indicated  Neurological - alert, oriented, normal speech, no focal findings or movement disorder noted, motor and sensory grossly normal bilaterally  Musculoskeletal - no joint tenderness, deformity or swelling  Extremities - peripheral pulses normal, no pedal edema, no clubbing or cyanosis  Skin - normal coloration and turgor, no rashes, no suspicious skin lesions noted              BMP:    Recent Labs     01/08/22  1100 01/08/22  1100 01/09/22  0503 01/10/22  0540     --  136  --    K 3.2*   < > 3.6 3.7   CL 99   < > 102 101   CO2 23  --  21 19*   BUN 9   < > 10 11   CREATININE 0.47*   < > 0.48* 0.42*   GLUCOSE 172*   < > 129* 100*    < > = values in this interval not displayed. .  MG:3,PHOS:3)@  Ionized Calcium: No results found for: IONCA  CBC:   Recent Labs     01/08/22  1100 01/09/22  0503   WBC 19.4* 20.2*   HGB 13.6 12.0    298      ABG: No results for input(s): PH, PCO2, PO2 in the last 72 hours. Assessment and Plan:     Impression:    - Syncope most likely sick sinus syndrome status post transvenous pacer. Required dopamine but has been off overnight  - COVID-19 infection, asymptomatic  - Left basilar atelectasis    -Leukocytosis. No evidence of infectious process. Recommendations :    -Continue to watch in droplet plus oxygen.  -Observe off dopamine. Plan for pacemaker by cardiology today.  -Leukocytosis is likely reactive. No clear signs of infection. Observe off antibiotics. -Does not need remdesivir at this point. Consider steroids if she becomes hypoxic. -DVT and GI prophylaxis.       Prophylaxis:  Stress ulcer: [] PPI Agent [] H2RA [] Sucralfate [] Other:   VTE: [] Enoxaparin  [] SC Heparin  [] SCD      Full Code       Electronically signed by Vitaliy Parisi MD on 1/10/2022 at 7:55 AM

## 2022-01-11 ENCOUNTER — APPOINTMENT (OUTPATIENT)
Dept: CARDIAC CATH/INVASIVE PROCEDURES | Age: 82
DRG: 242 | End: 2022-01-11
Payer: MEDICARE

## 2022-01-11 ENCOUNTER — APPOINTMENT (OUTPATIENT)
Dept: GENERAL RADIOLOGY | Age: 82
DRG: 242 | End: 2022-01-11
Payer: MEDICARE

## 2022-01-11 LAB
ALBUMIN SERPL-MCNC: 2.9 G/DL (ref 3.5–4.6)
ALP BLD-CCNC: 82 U/L (ref 40–130)
ALT SERPL-CCNC: 15 U/L (ref 0–33)
ANION GAP SERPL CALCULATED.3IONS-SCNC: 12 MEQ/L (ref 9–15)
AST SERPL-CCNC: 16 U/L (ref 0–35)
BASOPHILS ABSOLUTE: 0 K/UL (ref 0–0.2)
BASOPHILS RELATIVE PERCENT: 0.3 %
BILIRUB SERPL-MCNC: 0.5 MG/DL (ref 0.2–0.7)
BUN BLDV-MCNC: 14 MG/DL (ref 8–23)
CALCIUM SERPL-MCNC: 8.8 MG/DL (ref 8.5–9.9)
CHLORIDE BLD-SCNC: 98 MEQ/L (ref 95–107)
CO2: 24 MEQ/L (ref 20–31)
CREAT SERPL-MCNC: 0.36 MG/DL (ref 0.5–0.9)
EOSINOPHILS ABSOLUTE: 0.2 K/UL (ref 0–0.7)
EOSINOPHILS RELATIVE PERCENT: 1.5 %
GFR AFRICAN AMERICAN: >60
GFR NON-AFRICAN AMERICAN: >60
GLOBULIN: 2.8 G/DL (ref 2.3–3.5)
GLUCOSE BLD-MCNC: 121 MG/DL (ref 70–99)
HCT VFR BLD CALC: 36.7 % (ref 37–47)
HEMOGLOBIN: 11.9 G/DL (ref 12–16)
LYMPHOCYTES ABSOLUTE: 1 K/UL (ref 1–4.8)
LYMPHOCYTES RELATIVE PERCENT: 8.1 %
MAGNESIUM: 1.9 MG/DL (ref 1.7–2.4)
MCH RBC QN AUTO: 26.9 PG (ref 27–31.3)
MCHC RBC AUTO-ENTMCNC: 32.6 % (ref 33–37)
MCV RBC AUTO: 82.8 FL (ref 82–100)
MONOCYTES ABSOLUTE: 1.3 K/UL (ref 0.2–0.8)
MONOCYTES RELATIVE PERCENT: 9.8 %
NEUTROPHILS ABSOLUTE: 10.4 K/UL (ref 1.4–6.5)
NEUTROPHILS RELATIVE PERCENT: 80.3 %
PDW BLD-RTO: 14 % (ref 11.5–14.5)
PLATELET # BLD: 240 K/UL (ref 130–400)
POTASSIUM SERPL-SCNC: 3.8 MEQ/L (ref 3.4–4.9)
RBC # BLD: 4.43 M/UL (ref 4.2–5.4)
SODIUM BLD-SCNC: 134 MEQ/L (ref 135–144)
TOTAL PROTEIN: 5.7 G/DL (ref 6.3–8)
WBC # BLD: 12.9 K/UL (ref 4.8–10.8)

## 2022-01-11 PROCEDURE — 6370000000 HC RX 637 (ALT 250 FOR IP): Performed by: INTERNAL MEDICINE

## 2022-01-11 PROCEDURE — 0JH606Z INSERTION OF PACEMAKER, DUAL CHAMBER INTO CHEST SUBCUTANEOUS TISSUE AND FASCIA, OPEN APPROACH: ICD-10-PCS | Performed by: INTERNAL MEDICINE

## 2022-01-11 PROCEDURE — 93005 ELECTROCARDIOGRAM TRACING: CPT | Performed by: INTERNAL MEDICINE

## 2022-01-11 PROCEDURE — 71045 X-RAY EXAM CHEST 1 VIEW: CPT

## 2022-01-11 PROCEDURE — 92953 TEMPORARY EXTERNAL PACING: CPT

## 2022-01-11 PROCEDURE — 2500000003 HC RX 250 WO HCPCS

## 2022-01-11 PROCEDURE — 2580000003 HC RX 258: Performed by: INTERNAL MEDICINE

## 2022-01-11 PROCEDURE — 85025 COMPLETE CBC W/AUTO DIFF WBC: CPT

## 2022-01-11 PROCEDURE — 33225 L VENTRIC PACING LEAD ADD-ON: CPT

## 2022-01-11 PROCEDURE — 2000000000 HC ICU R&B

## 2022-01-11 PROCEDURE — C1892 INTRO/SHEATH,FIXED,PEEL-AWAY: HCPCS

## 2022-01-11 PROCEDURE — 6360000002 HC RX W HCPCS

## 2022-01-11 PROCEDURE — 80053 COMPREHEN METABOLIC PANEL: CPT

## 2022-01-11 PROCEDURE — 36415 COLL VENOUS BLD VENIPUNCTURE: CPT

## 2022-01-11 PROCEDURE — 2580000003 HC RX 258

## 2022-01-11 PROCEDURE — 02HK3JZ INSERTION OF PACEMAKER LEAD INTO RIGHT VENTRICLE, PERCUTANEOUS APPROACH: ICD-10-PCS | Performed by: INTERNAL MEDICINE

## 2022-01-11 PROCEDURE — C1785 PMKR, DUAL, RATE-RESP: HCPCS

## 2022-01-11 PROCEDURE — 6360000002 HC RX W HCPCS: Performed by: INTERNAL MEDICINE

## 2022-01-11 PROCEDURE — 99233 SBSQ HOSP IP/OBS HIGH 50: CPT | Performed by: INTERNAL MEDICINE

## 2022-01-11 PROCEDURE — C1898 LEAD, PMKR, OTHER THAN TRANS: HCPCS

## 2022-01-11 PROCEDURE — 83735 ASSAY OF MAGNESIUM: CPT

## 2022-01-11 PROCEDURE — 33206 INSERT HEART PM ATRIAL: CPT

## 2022-01-11 PROCEDURE — 2709999900 HC NON-CHARGEABLE SUPPLY

## 2022-01-11 PROCEDURE — 99232 SBSQ HOSP IP/OBS MODERATE 35: CPT | Performed by: INTERNAL MEDICINE

## 2022-01-11 RX ORDER — SODIUM CHLORIDE 0.9 % (FLUSH) 0.9 %
5-40 SYRINGE (ML) INJECTION EVERY 12 HOURS SCHEDULED
Status: DISCONTINUED | OUTPATIENT
Start: 2022-01-11 | End: 2022-01-16

## 2022-01-11 RX ORDER — ONDANSETRON 2 MG/ML
4 INJECTION INTRAMUSCULAR; INTRAVENOUS EVERY 6 HOURS PRN
Status: DISCONTINUED | OUTPATIENT
Start: 2022-01-11 | End: 2022-01-16 | Stop reason: HOSPADM

## 2022-01-11 RX ORDER — SODIUM CHLORIDE 0.9 % (FLUSH) 0.9 %
5-40 SYRINGE (ML) INJECTION PRN
Status: DISCONTINUED | OUTPATIENT
Start: 2022-01-11 | End: 2022-01-16

## 2022-01-11 RX ORDER — TRAMADOL HYDROCHLORIDE 50 MG/1
100 TABLET ORAL EVERY 6 HOURS PRN
Status: DISCONTINUED | OUTPATIENT
Start: 2022-01-11 | End: 2022-01-16 | Stop reason: SDUPTHER

## 2022-01-11 RX ORDER — CHLORHEXIDINE GLUCONATE 4 G/100ML
SOLUTION TOPICAL ONCE
Status: COMPLETED | OUTPATIENT
Start: 2022-01-11 | End: 2022-01-11

## 2022-01-11 RX ORDER — SODIUM CHLORIDE 9 MG/ML
INJECTION, SOLUTION INTRAVENOUS CONTINUOUS
Status: DISCONTINUED | OUTPATIENT
Start: 2022-01-11 | End: 2022-01-13

## 2022-01-11 RX ORDER — SODIUM CHLORIDE 9 MG/ML
25 INJECTION, SOLUTION INTRAVENOUS PRN
Status: DISCONTINUED | OUTPATIENT
Start: 2022-01-11 | End: 2022-01-16 | Stop reason: HOSPADM

## 2022-01-11 RX ORDER — TRAMADOL HYDROCHLORIDE 50 MG/1
50 TABLET ORAL EVERY 6 HOURS PRN
Status: DISCONTINUED | OUTPATIENT
Start: 2022-01-11 | End: 2022-01-16 | Stop reason: SDUPTHER

## 2022-01-11 RX ADMIN — SODIUM CHLORIDE 75 ML/HR: 9 INJECTION, SOLUTION INTRAVENOUS at 17:36

## 2022-01-11 RX ADMIN — CHLORHEXIDINE GLUCONATE: 213 SOLUTION TOPICAL at 05:54

## 2022-01-11 RX ADMIN — Medication 10 ML: at 20:43

## 2022-01-11 RX ADMIN — MUPIROCIN: 20 OINTMENT TOPICAL at 05:53

## 2022-01-11 RX ADMIN — SODIUM CHLORIDE: 9 INJECTION, SOLUTION INTRAVENOUS at 05:00

## 2022-01-11 RX ADMIN — ONDANSETRON 4 MG: 2 INJECTION INTRAMUSCULAR; INTRAVENOUS at 06:59

## 2022-01-11 RX ADMIN — MORPHINE SULFATE 4 MG: 4 INJECTION INTRAVENOUS at 00:06

## 2022-01-11 RX ADMIN — Medication 10 ML: at 08:54

## 2022-01-11 RX ADMIN — ONDANSETRON 4 MG: 2 INJECTION INTRAMUSCULAR; INTRAVENOUS at 00:03

## 2022-01-11 RX ADMIN — Medication 10 ML: at 20:44

## 2022-01-11 RX ADMIN — AMLODIPINE BESYLATE 5 MG: 5 TABLET ORAL at 08:53

## 2022-01-11 ASSESSMENT — PAIN SCALES - GENERAL
PAINLEVEL_OUTOF10: 0
PAINLEVEL_OUTOF10: 0
PAINLEVEL_OUTOF10: 2
PAINLEVEL_OUTOF10: 0
PAINLEVEL_OUTOF10: 3
PAINLEVEL_OUTOF10: 0
PAINLEVEL_OUTOF10: 8

## 2022-01-11 ASSESSMENT — PAIN DESCRIPTION - PROGRESSION: CLINICAL_PROGRESSION: NOT CHANGED

## 2022-01-11 ASSESSMENT — ENCOUNTER SYMPTOMS
RESPIRATORY NEGATIVE: 1
GASTROINTESTINAL NEGATIVE: 1

## 2022-01-11 ASSESSMENT — PAIN - FUNCTIONAL ASSESSMENT: PAIN_FUNCTIONAL_ASSESSMENT: PREVENTS OR INTERFERES WITH MANY ACTIVE NOT PASSIVE ACTIVITIES

## 2022-01-11 NOTE — PROGRESS NOTES
Hospitalist Progress Note      Date of Admission: 1/8/2022  Chief Complaint:    Chief Complaint   Patient presents with    Dizziness    Emesis     Subjective:  She had some lightheadedness and nausea earlier today. Now it has resolved. No sinus congestion, dyspnea, cough, chest pain. .       Medications:    Infusion Medications    sodium chloride 75 mL/hr at 01/11/22 0500    sodium chloride      DOPamine 2.5 mcg/kg/min (01/08/22 1425)    sodium chloride       Scheduled Medications    sodium chloride flush  5-40 mL IntraVENous 2 times per day    vancomycin (VANCOCIN) IV  1,000 mg IntraVENous On Call to OR    [START ON 1/12/2022] losartan  100 mg Oral Daily    amLODIPine  5 mg Oral Daily    pantoprazole  40 mg Oral QAM AC    [Held by provider] oxybutynin  15 mg Oral Daily    levothyroxine  112 mcg Oral Daily    sodium chloride flush  5-40 mL IntraVENous 2 times per day     PRN Meds: sodium chloride flush, sodium chloride, acetaminophen, hydrALAZINE, sodium chloride flush, sodium chloride, ondansetron, morphine **OR** morphine  No intake or output data in the 24 hours ending 01/11/22 1125  Exam:  BP (!) 150/82   Pulse 67   Temp 98.3 °F (36.8 °C) (Oral)   Resp 16   Ht 5' 5\" (1.651 m)   Wt 203 lb 11.3 oz (92.4 kg)   SpO2 97%   BMI 33.90 kg/m²   Head: Normocephalic, atraumatic  Sclera clear  Neck JVD flat  Lungs: normal effort of breathing    Labs:   Recent Labs     01/09/22  0503 01/10/22  1354 01/11/22  0515   WBC 20.2* 15.3* 12.9*   HGB 12.0 11.8* 11.9*   HCT 37.2 36.0* 36.7*    264 240     Recent Labs     01/09/22  0503 01/10/22  0540 01/11/22  0515    134* 134*   K 3.6 3.7 3.8    101 98   CO2 21 19* 24   BUN 10 11 14   CREATININE 0.48* 0.42* 0.36*   CALCIUM 8.7 8.8 8.8   AST  --   --  16   ALT  --   --  15   BILITOT  --   --  0.5   ALKPHOS  --   --  82     No results for input(s): INR in the last 72 hours.   No results for input(s): Vicki Roper in the last 72 hours. Radiology:  XR FOOT RIGHT (MIN 3 VIEWS)   Final Result      ACUTE FRACTURES OF THE SECOND THROUGH FOURTH (AND PROBABLY FIFTH) METATARSALS, AS NOTED. XR CHEST PORTABLE   Final Result      INFERIOR VENA CAVA APPROACH PACER LEADS IN EXPECTED POSITION. POOR INSPIRATION WITH MILD LEFT BASILAR INFILTRATE/ATELECTASIS. BRONCHOPNEUMONIA SHOULD BE EXCLUDED CLINICALLY. XR ANKLE RIGHT (MIN 3 VIEWS)   Final Result      INCOMPLETELY VISUALIZED METATARSAL FRACTURES, AS NOTED. DEDICATED RIGHT FOOT RADIOGRAPHS ARE SUGGESTED. NONDISPLACED MEDIAL MALLEOLAR FRACTURE. The findings were discussed with the patient's ICU nurse at approximately 7:28 AM on 1/9/2022. CT HEAD WO CONTRAST   Final Result      NO ACUTE INTRACRANIAL PROCESS OR SIGNIFICANT CHANGE FROM PRIOR STUDIES IDENTIFIED. XR CHEST PORTABLE   Final Result   NO ACUTE CARDIOPULMONARY DISEASE. Assessment/Plan:    SSS with sinus pause: plan of pacemaker 1/11.  -recommend to hold ARB on day of surgery    Covid positive without hypoxia. Vaccinated but not boosted. Does not require further tx at this time.     R ankle and metatarsal fx: management per Ortho    35 minutes total care time, >1/2 in unit/floor time and care coordination     Hardy Rodriguez DO

## 2022-01-11 NOTE — PROGRESS NOTES
Patient ID:  Jack Arias  [id number]  43 y.o.  1940    19:15 Assumed Care of Patient. Disaster Documentation initiated as per policy / SOP. Report Received from [facility]. 20:15 Assessment Complete, please see flow sheets. Labs, orders, plan of care and meds reviewed. Patient lined changed and janine care given, New urine collection device given . Reviewed plan of care regarding Pacemaker insertion scheduled tomm. Patient verbalizes an understanding. Patient to be NPO after midnight. 0100 Patient asleep Pulse ox dropping to 88%. Placed on 2L O2 per nasal cannula  0200 Patient resting with eyes closed. VSS.    0600 Complete bath given using Hibclens as ordered. Bactroban to Bilateral Nares for Pre-op Prep. 0.9 Normal Saline Infusion started. 12 Lead EKG performed as ordered. [address] [facility] to read. Consent for Pacemaker Insertion obtained. 0700 Patient having small emesis of light green bile. Medicated with Zofran 4 mg.     Labs:   Recent Labs     01/08/22  1100 01/09/22  0503 01/10/22  1354   WBC 19.4* 20.2* 15.3*   HGB 13.6 12.0 11.8*   HCT 41.9 37.2 36.0*    298 264     Recent Labs     01/08/22  1100 01/09/22  0503 01/10/22  0540    136 134*   K 3.2* 3.6 3.7   CL 99 102 101   CO2 23 21 19*   BUN 9 10 11   CREATININE 0.47* 0.48* 0.42*   CALCIUM 9.4 8.7 8.8     Recent Labs     01/08/22  1100   AST 16   ALT 15   BILITOT 0.6   ALKPHOS 98               Electronically signed by Anil Figueroa RN

## 2022-01-11 NOTE — PROGRESS NOTES
Infectious Disease     Patient Name: Dee Colon  Date: 1/11/2022  YOB: 1940  Medical Record Number: [id number]          COVID-19 infection    Vaccinated patient  No evidence of pneumonia  Normal procalcitonin      Syncopal episodes temporary pacemaker to have permanent      Review of Systems   Constitutional: Negative. Respiratory: Negative. Cardiovascular: Negative. Gastrointestinal: Negative. Physical Exam  Constitutional:       Appearance: She is not ill-appearing. Cardiovascular:      Heart sounds: Normal heart sounds. No murmur heard. Pulmonary:      Effort: Pulmonary effort is normal. No respiratory distress. Breath sounds: No stridor. No wheezing, rhonchi or rales. Abdominal:      General: Abdomen is flat. Bowel sounds are normal. There is no distension. Palpations: Abdomen is soft. There is no mass. Tenderness: There is no abdominal tenderness. There is no guarding. Blood pressure (!) 150/82, pulse 67, temperature 98.3 °F (36.8 °C), temperature source Oral, resp. rate 16, height 5' 5\" (1.651 m), weight 203 lb 11.3 oz (92.4 kg), SpO2 97 %.       .   Lab Results   Component Value Date    WBC 12.9 (H) 01/11/2022    HGB 11.9 (L) 01/11/2022    HCT 36.7 (L) 01/11/2022    MCV 82.8 01/11/2022     01/11/2022     Lab Results   Component Value Date     01/11/2022    K 3.8 01/11/2022    CL 98 01/11/2022    CO2 24 01/11/2022    BUN 14 01/11/2022    CREATININE 0.36 01/11/2022    GLUCOSE 121 01/11/2022    GLUCOSE 89 12/03/2019    CALCIUM 8.8 01/11/2022         Urine Reflex to Culture [[id number]] (Abnormal) Collected: 01/08/22 1100     Specimen: Urine, clean catch Updated: 01/08/22 1243      Color, UA Yellow      Clarity, UA Clear      Glucose, Ur Negative mg/dL       Bilirubin Urine Negative      Ketones, Urine 15 mg/dL       Specific Gravity, UA 1.015      Blood, Urine Negative      pH, UA 6.5      Protein, UA TRACE mg/dL      Urobilinogen, Urine 0.2 E.U./dL       Nitrite, Urine Negative      Leukocyte Esterase, Urine Negative      Urine Reflex to Culture Not Indicated       XR CHEST PORTABLE : 1/9/2022       CLINICAL HISTORY:  Pacemaker placement and rule out pneumothorax .       COMPARISON: 1/8/2022.       TECHNIQUE: A portable upright AP radiograph of the chest was obtained.           FINDINGS:       The heart is mildly enlarged with an inferior vena cava approach pacer lead with its tip overlying the cardiac apex.       A poor inspiratory volume is present with mild left basilar infiltrate/atelectasis. Bronchopneumonia should be excluded clinically.       There is no sizable pleural effusion, gross vascular congestion, pneumothorax, or displaced fractures identified.                   Impression       INFERIOR VENA CAVA APPROACH PACER LEADS IN EXPECTED POSITION.       POOR INSPIRATION WITH MILD LEFT BASILAR INFILTRATE/ATELECTASIS.       BRONCHOPNEUMONIA SHOULD BE EXCLUDED CLINICALLY. XR FOOT RIGHT (MIN 3 VIEWS) : 1/9/2022       CLINICAL HISTORY:  fx metatarsal .       COMPARISON: Right ankle radiographs 1/8/2022.       TECHNIQUE: AP, lateral, and oblique radiographs of the right foot were obtained.           FINDINGS:        A minimally comminuted fracture of the proximal third of the right third metatarsal is displaced approximately 5 to 6 mm with nearly 1 cm of overriding and foreshortening.       An oblique fracture of the distal third of the right second metatarsal is present, with approximately 3 to 4 mm of lateral displacement.       There is a nondisplaced fracture of the base of the right fourth metatarsal (and probably fifth metatarsal).       A nondisplaced medial malleolar fracture appears unchanged.                   Impression       ACUTE FRACTURES OF THE SECOND THROUGH FOURTH (AND PROBABLY FIFTH) METATARSALS, AS NOTED.                    ASSESSMENT:  PLAN:      COVID-19 infection  No therapy indicated  Leukocytosis improving  No evidence of active bacterial infection    Patient to have permanent pacemaker

## 2022-01-11 NOTE — FLOWSHEET NOTE
0800 Assessment complete, see flow sheet. Patient A/O X4, following all commands. Right groin site remains clean dry and intact with no bleeding and or hematoma noted. Resting at present time without complaints. 46 Seen by Dr Shae Kim on morning rounds update given. 1200 Incont of moderate amount of urine. Patient cleansed and bed linen changed, new purwick applied. Patient repositioned for comfort. No changes in assessment noted. 1525 To cath lab via bed with cath lab staff in stable condition. No changes in assessment noted. 1720 Returned from cath lab via bed with cath lab staff. Patient A/O but sleepy. Left arm in a sling, pacer site with aqucel dressing, clean dry and intact. Rite groin site dressing clean dry and intact, no bleeding and or hematoma noted. Jessica Buffy in place and draining well.

## 2022-01-11 NOTE — CARE COORDINATION
TEAM ICU ROUNDS DONE. PT HAS BEEN ON TPM/BEDREST. CONTINUES ON DOPAMINE DRIP. PT GOING FOR PACEMAKER TODAY. PT WILL NEED PT/OT TO DETERMINE DISCHARGE NEEDS. PER REPORT, PT HAS BOOT ON FOR ANKLE FX, WEIGHT BEARING BEDSIDE NURSE REPORT.

## 2022-01-11 NOTE — PROGRESS NOTES
Critical Care Progress Note    2022 3:01 PM    Subjective:   Admit Date: 2022  PCP: Maryann Parekh MD    Chief Complaint   Patient presents with    Dizziness    Emesis     Interval History: Heart rate has been good overnight. Not requiring dopamine. Not required to be paced. Going for pacemaker today. 14 points review of systems has been obtained and negative except to was mentioned in HPI. Medications:   Scheduled Meds:   sodium chloride flush  5-40 mL IntraVENous 2 times per day    irrigation builder   Irrigation On Call to OR    vancomycin  1,000 mg IntraVENous On Call to [address] 2022] losartan  100 mg Oral Daily    amLODIPine  5 mg Oral Daily    pantoprazole  40 mg Oral QAM AC    [Held by provider] oxybutynin  15 mg Oral Daily    levothyroxine  112 mcg Oral Daily    sodium chloride flush  5-40 mL IntraVENous 2 times per day     Continuous Infusions:   sodium chloride 75 mL/hr at 22 0500    sodium chloride      DOPamine 2.5 mcg/kg/min (22 1425)    sodium chloride           Objective:   Vitals:   Temp (24hrs), Av.2 °F (36.8 °C), Min:98 °F (36.7 °C), Max:98.4 °F (36.9 °C)    BP (!) 160/64   Pulse 65   Temp 98.2 °F (36.8 °C) (Oral)   Resp 17   Ht 5' 5\" (1.651 m)   Wt 203 lb 11.3 oz (92.4 kg)   SpO2 96%   BMI 33.90 kg/m²   I/O:24HR INTAKE/OUTPUT:    No intake or output data in the 24 hours ending 22 1501  No intake/output data recorded.   CVP:                 Physical Exam:  General appearance - alert, ill appearing, and in no distress  Mental status - alert, oriented to person, place, and time  Eyes - pupils equal and reactive, extraocular eye movements intact  Nose - normal and patent, no erythema, discharge or polyps  Neck - supple, no significant adenopathy  Chest - Diminished B/L clear to auscultation, no wheezes, rales or rhonchi, symmetric air entry  Heart - normal rate, regular rhythm, normal S1, S2, no murmurs, rubs, clicks or gallops  Abdomen - soft, nontender, nondistended, no masses or organomegaly  Rectal - deferred, not clinically indicated  Neurological - alert, oriented, normal speech, no focal findings or movement disorder noted, motor and sensory grossly normal bilaterally  Musculoskeletal - no joint tenderness, deformity or swelling  Extremities - peripheral pulses normal, no pedal edema, no clubbing or cyanosis  Skin - normal coloration and turgor, no rashes, no suspicious skin lesions noted              BMP:    Recent Labs     01/09/22  0503 01/09/22  0503 01/10/22  0540 01/11/22  0515      < > 134* 134*   K 3.6   < > 3.7 3.8      < > 101 98   CO2 21  --  19* 24   BUN 10   < > 11 14   CREATININE 0.48*   < > 0.42* 0.36*   GLUCOSE 129*   < > 100* 121*    < > = values in this interval not displayed. .  MG:3,PHOS:3)@  Ionized Calcium: No results found for: IONCA  CBC:   Recent Labs     01/10/22  1354 01/11/22  0515   WBC 15.3* 12.9*   HGB 11.8* 11.9*    240      ABG: No results for input(s): PH, PCO2, PO2 in the last 72 hours. Assessment and Plan:     Impression:    - Syncope most likely sick sinus syndrome status post transvenous pacer. Required dopamine but has been off more than 24 hours  - COVID-19 infection, asymptomatic.   - Left basilar atelectasis    -Leukocytosis. No evidence of infectious process. Recommendations :    -Continue to watch in droplet plus oxygen.  -Observe off dopamine. Plan for pacemaker by cardiology today.  -Leukocytosis is likely reactive. No clear signs of infection. Observe off antibiotics. -Does not need remdesivir at this point. Consider steroids if she becomes hypoxic. -DVT and GI prophylaxis. Can likely leave the ICU after pacemaker is placed.     Prophylaxis:  Stress ulcer: [] PPI Agent [] H2RA [] Sucralfate [] Other:   VTE: [] Enoxaparin  [] SC Heparin  [] SCD      Full Code       Electronically signed by Loraine Bryan MD on 1/11/2022 at 3:01 PM

## 2022-01-11 NOTE — BRIEF OP NOTE
Brief Postoperative Note      Patient: Marco A Bagley  YOB: 1940  MRN: [id number]    Date of Procedure:  01/11/22    Successful implantation of a dual chamber pacemaker    Electronically signed by London Koo MD on 1/11/2022 at 4:55 PM

## 2022-01-12 ENCOUNTER — APPOINTMENT (OUTPATIENT)
Dept: GENERAL RADIOLOGY | Age: 82
DRG: 242 | End: 2022-01-12
Payer: MEDICARE

## 2022-01-12 ENCOUNTER — APPOINTMENT (OUTPATIENT)
Dept: CT IMAGING | Age: 82
DRG: 242 | End: 2022-01-12
Payer: MEDICARE

## 2022-01-12 LAB
BASOPHILS ABSOLUTE: 0.1 K/UL (ref 0–0.2)
BASOPHILS RELATIVE PERCENT: 0.6 %
D DIMER: 2.16 MG/L FEU (ref 0–0.5)
EKG ATRIAL RATE: 117 BPM
EKG Q-T INTERVAL: 396 MS
EKG QRS DURATION: 98 MS
EKG QTC CALCULATION (BAZETT): 528 MS
EKG R AXIS: -26 DEGREES
EKG T AXIS: 36 DEGREES
EKG VENTRICULAR RATE: 107 BPM
EOSINOPHILS ABSOLUTE: 0.3 K/UL (ref 0–0.7)
EOSINOPHILS RELATIVE PERCENT: 2.7 %
HCT VFR BLD CALC: 33.7 % (ref 37–47)
HEMOGLOBIN: 10.9 G/DL (ref 12–16)
LYMPHOCYTES ABSOLUTE: 1 K/UL (ref 1–4.8)
LYMPHOCYTES RELATIVE PERCENT: 9.3 %
MCH RBC QN AUTO: 26.6 PG (ref 27–31.3)
MCHC RBC AUTO-ENTMCNC: 32.5 % (ref 33–37)
MCV RBC AUTO: 81.7 FL (ref 82–100)
MONOCYTES ABSOLUTE: 1 K/UL (ref 0.2–0.8)
MONOCYTES RELATIVE PERCENT: 9.9 %
NEUTROPHILS ABSOLUTE: 8.1 K/UL (ref 1.4–6.5)
NEUTROPHILS RELATIVE PERCENT: 77.5 %
PDW BLD-RTO: 13.9 % (ref 11.5–14.5)
PLATELET # BLD: 261 K/UL (ref 130–400)
RBC # BLD: 4.12 M/UL (ref 4.2–5.4)
WBC # BLD: 10.4 K/UL (ref 4.8–10.8)

## 2022-01-12 PROCEDURE — 6370000000 HC RX 637 (ALT 250 FOR IP): Performed by: INTERNAL MEDICINE

## 2022-01-12 PROCEDURE — 92953 TEMPORARY EXTERNAL PACING: CPT

## 2022-01-12 PROCEDURE — 99233 SBSQ HOSP IP/OBS HIGH 50: CPT | Performed by: INTERNAL MEDICINE

## 2022-01-12 PROCEDURE — 2580000003 HC RX 258: Performed by: INTERNAL MEDICINE

## 2022-01-12 PROCEDURE — 2060000000 HC ICU INTERMEDIATE R&B

## 2022-01-12 PROCEDURE — 71046 X-RAY EXAM CHEST 2 VIEWS: CPT

## 2022-01-12 PROCEDURE — 36415 COLL VENOUS BLD VENIPUNCTURE: CPT

## 2022-01-12 PROCEDURE — 71250 CT THORAX DX C-: CPT

## 2022-01-12 PROCEDURE — 85379 FIBRIN DEGRADATION QUANT: CPT

## 2022-01-12 PROCEDURE — 2700000000 HC OXYGEN THERAPY PER DAY

## 2022-01-12 PROCEDURE — 6360000002 HC RX W HCPCS: Performed by: INTERNAL MEDICINE

## 2022-01-12 PROCEDURE — 85025 COMPLETE CBC W/AUTO DIFF WBC: CPT

## 2022-01-12 RX ORDER — METOPROLOL SUCCINATE 25 MG/1
25 TABLET, EXTENDED RELEASE ORAL DAILY
Status: DISCONTINUED | OUTPATIENT
Start: 2022-01-12 | End: 2022-01-16 | Stop reason: HOSPADM

## 2022-01-12 RX ORDER — POLYETHYLENE GLYCOL 3350 17 G/17G
17 POWDER, FOR SOLUTION ORAL DAILY
Status: COMPLETED | OUTPATIENT
Start: 2022-01-12 | End: 2022-01-13

## 2022-01-12 RX ORDER — SENNA PLUS 8.6 MG/1
1 TABLET ORAL NIGHTLY
Status: COMPLETED | OUTPATIENT
Start: 2022-01-12 | End: 2022-01-13

## 2022-01-12 RX ADMIN — Medication 10 ML: at 08:23

## 2022-01-12 RX ADMIN — METOPROLOL SUCCINATE 25 MG: 25 TABLET, FILM COATED, EXTENDED RELEASE ORAL at 13:17

## 2022-01-12 RX ADMIN — MORPHINE SULFATE 2 MG: 2 INJECTION, SOLUTION INTRAMUSCULAR; INTRAVENOUS at 20:07

## 2022-01-12 RX ADMIN — SODIUM CHLORIDE: 9 INJECTION, SOLUTION INTRAVENOUS at 20:06

## 2022-01-12 RX ADMIN — POLYETHYLENE GLYCOL 3350 17 G: 17 POWDER, FOR SOLUTION ORAL at 14:06

## 2022-01-12 RX ADMIN — LEVOTHYROXINE SODIUM 112 MCG: 112 TABLET ORAL at 05:27

## 2022-01-12 RX ADMIN — ONDANSETRON 4 MG: 2 INJECTION INTRAMUSCULAR; INTRAVENOUS at 10:34

## 2022-01-12 RX ADMIN — Medication 10 ML: at 22:02

## 2022-01-12 RX ADMIN — Medication 10 ML: at 20:07

## 2022-01-12 RX ADMIN — ONDANSETRON 4 MG: 2 INJECTION INTRAMUSCULAR; INTRAVENOUS at 05:37

## 2022-01-12 RX ADMIN — PANTOPRAZOLE SODIUM 40 MG: 40 TABLET, DELAYED RELEASE ORAL at 05:26

## 2022-01-12 RX ADMIN — TRAMADOL HYDROCHLORIDE 100 MG: 50 TABLET, COATED ORAL at 00:44

## 2022-01-12 RX ADMIN — SODIUM CHLORIDE: 9 INJECTION, SOLUTION INTRAVENOUS at 05:27

## 2022-01-12 RX ADMIN — SENNOSIDES 8.6 MG: 8.6 TABLET, COATED ORAL at 20:07

## 2022-01-12 RX ADMIN — LOSARTAN POTASSIUM 100 MG: 100 TABLET, FILM COATED ORAL at 08:22

## 2022-01-12 RX ADMIN — Medication 10 ML: at 08:24

## 2022-01-12 RX ADMIN — VANCOMYCIN HYDROCHLORIDE 1000 MG: 1 INJECTION, POWDER, LYOPHILIZED, FOR SOLUTION INTRAVENOUS at 08:22

## 2022-01-12 RX ADMIN — MORPHINE SULFATE 4 MG: 4 INJECTION INTRAVENOUS at 23:49

## 2022-01-12 RX ADMIN — AMLODIPINE BESYLATE 5 MG: 5 TABLET ORAL at 08:22

## 2022-01-12 ASSESSMENT — PAIN SCALES - GENERAL
PAINLEVEL_OUTOF10: 0
PAINLEVEL_OUTOF10: 6
PAINLEVEL_OUTOF10: 8
PAINLEVEL_OUTOF10: 0
PAINLEVEL_OUTOF10: 3
PAINLEVEL_OUTOF10: 0
PAINLEVEL_OUTOF10: 9
PAINLEVEL_OUTOF10: 6
PAINLEVEL_OUTOF10: 0

## 2022-01-12 ASSESSMENT — PAIN DESCRIPTION - FREQUENCY: FREQUENCY: INTERMITTENT

## 2022-01-12 ASSESSMENT — PAIN DESCRIPTION - PROGRESSION: CLINICAL_PROGRESSION: GRADUALLY WORSENING

## 2022-01-12 ASSESSMENT — PAIN DESCRIPTION - PAIN TYPE: TYPE: ACUTE PAIN

## 2022-01-12 ASSESSMENT — PAIN - FUNCTIONAL ASSESSMENT: PAIN_FUNCTIONAL_ASSESSMENT: PREVENTS OR INTERFERES WITH MANY ACTIVE NOT PASSIVE ACTIVITIES

## 2022-01-12 ASSESSMENT — PAIN DESCRIPTION - ONSET: ONSET: PROGRESSIVE

## 2022-01-12 ASSESSMENT — PAIN DESCRIPTION - ORIENTATION: ORIENTATION: RIGHT

## 2022-01-12 ASSESSMENT — PAIN DESCRIPTION - DESCRIPTORS: DESCRIPTORS: ACHING;DISCOMFORT

## 2022-01-12 NOTE — CARE COORDINATION
PT TRANSFERRED TO 1WT. RECEIVED ORDER FOT PT/OT TO HELP DETERMINE ANY D/C NEEDS. PROGRESSING WITH POC AS WRITTEN. ATTEMPTED TO CONTACT PT AT 1673 TO INFORM OF NEW ORDER FOR THERAPY.

## 2022-01-12 NOTE — OP NOTE
[address]                      [address], [address] [facility]                                OPERATIVE REPORT    PATIENT NAME: Elba Archer                 :        1940  MED REC NO:   [id number]                            ROOM:       17  ACCOUNT NO:   [de-identified]                           ADMIT DATE: 2022  PROVIDER:     Tony Osorio MD    DATE OF PROCEDURE:  2022    This is a procedure that was performed in the electrophysiology  laboratory at [facility]. OPERATION PERFORMED:  1. Fluoroscopy. 2.  Pocket flushing. 3.  Dual-chamber pacemaker implantation. 4.  Removal of a temporary pacer wire. SURGEON:  Tony Osorio MD    PATIENT HISTORY:  Please refer to the history and physical on the  patient's medical chart. History of COVID-19 pneumonia associated with  nausea, vomiting, and syncope associated with significant pauses on  telemetry up to 15 seconds of duration. The patient is status post  temporary pacer wire placed in the right groin. The patient is  scheduled for a dual-chamber pacemaker implantation. PROCEDURE NARRATIVE:  The risks, benefits, and possible complications  were explained to the patient and informed consent was obtained. The  patient was in a fasting state. A grounding pad was placed. Defibrillation pads were placed. The patient was in continuous  monitoring for pulse oximetry on 12-lead EKG. The upper chest was  prepped and draped in usual sterile fashion. After preoperative IV  antibiotic was completely infused, subcutaneous tissues medial to the  left deltopectoral area were infiltrated with lidocaine 1% for local  anesthesia. Using #15 scalpel, an incision was made which was extended  to the prepectoral fascia using blunt dissection. The left subclavian  vein was accessed using a Seldinger technique.   A pacemaker lead was  advanced to the heart under fluoroscopic guidance and a sheath was  pulled away. The lead was placed in the right ventricular low septum. Appropriate sensing and impedances were obtained. No diaphragmatic  pacing occurred at 10 V at 1.5 msec. Using the wire, a sheath was  placed over the wire and the dilator and the wire were removed. A  pacemaker lead was advanced to the heart under fluoroscopic guidance and  a sheath was pulled away. The lead was placed in the right atrial  appendage. Appropriate sensing and impedances were obtained. No  diaphragmatic pacing occurred at 10 V at 1.5 msec. The sheath was  peeled away. The leads were sutured in place to the pectoralis muscle  using 0 Ethibond suture. Hemostasis was obtained with electrocautery,  one pursestring of 0 Ethibond and Adwoa. Lead measurements were  reevaluated. A left prepectoral pocket was fashioned. A dual-chamber  pacemaker (Medtronic [place] XTDR MRI model number [id number], serial number  [id number]) was attached to the leads and implanted. The device was  implanted. Telemetry electrograms and pacing and sensing thresholds  were measured. The pocket was flushed with solution of saline and  vancomycin. Wound hemostasis was obtained with electrocautery. The  wound was closed in three layers. The skin was approximated with  subcuticular suture and Steri-Strips. A pressure dressing was applied. At the end of the procedure, the temporary pacer wire was removed under  fluoroscopy guidance intact. The sheath was removed and manual pressure  was performed for 20 minutes with no evidence of bleeding. The patient  left the EP laboratory in stable condition. COMPLICATIONS:  The patient tolerated the procedure without any  complications or incidents. No complications occurred. LEAD PARAMETERS  LEAD #1:  Chamber:  Right atrium. Model Information:  Medtronic [id number], serial number [id number],  implanted on 01/11/2022. Location:  Right atrial appendage.   Capture:  2.125 V at 0.4 milliseconds. Impedance:  475 ohms. EGM Amplitude:  1.375 V. Diaphragmatic Stimulation:  None. Status:  Active. LEAD #2:  Chamber:  Right ventricle. Model Information:  Medtronic [id number], serial number [id number],  implanted on 01/11/2022. Location:  Right ventricular low septum. Capture:  0.5 V at 0.4 milliseconds. Impedance:  760 ohms. EGM Amplitude:  5.0 mV. Diaphragmatic Stimulation:  None. Status:  Active. SUMMARY:  1. Successful implantation of dual-chamber pacemaker. 2.  Successful removal of a temporary pacer wire under fluoroscopy  guidance. FOLLOWUP:  1. The patient will remain in the hospital overnight for telemetry  monitoring and observation with anticipated discharge when okay by  primary team.  2.  Follow up in my office in seven days for wound assessment. 3.  Follow up with the [facility] as scheduled. 4.  The patient was instructed for bleeding, swelling, or signs of  infection. This is a complex procedure due to the patient has a COVID-19  pneumonia/infection.         Fredrick Willoughby MD    D: 01/11/2022 17:07:01       T: 01/12/2022 0:11:14     [email or web address]  Job#: [id number]     Doc#: [id number]    CC:

## 2022-01-12 NOTE — PROGRESS NOTES
Hospitalist Progress Note      Date of Admission: 1/8/2022  Chief Complaint:    Chief Complaint   Patient presents with    Dizziness    Emesis     Subjective:  She will have intermittent lightheadedness and nausea but at the moment she feels fine.       Medications:    Infusion Medications    sodium chloride 75 mL/hr at 01/12/22 0527    sodium chloride      sodium chloride      sodium chloride       Scheduled Medications    metoprolol succinate  25 mg Oral Daily    polyethylene glycol  17 g Oral Daily    senna  1 tablet Oral Nightly    sodium chloride flush  5-40 mL IntraVENous 2 times per day    sodium chloride flush  5-40 mL IntraVENous 2 times per day    losartan  100 mg Oral Daily    amLODIPine  5 mg Oral Daily    pantoprazole  40 mg Oral QAM AC    levothyroxine  112 mcg Oral Daily    sodium chloride flush  5-40 mL IntraVENous 2 times per day     PRN Meds: sodium chloride flush, sodium chloride, sodium chloride flush, sodium chloride, traMADol **OR** traMADol, ondansetron, acetaminophen, hydrALAZINE, sodium chloride flush, sodium chloride, ondansetron, morphine **OR** morphine    Intake/Output Summary (Last 24 hours) at 1/12/2022 1357  Last data filed at 1/12/2022 1056  Gross per 24 hour   Intake 2211 ml   Output 751 ml   Net 1460 ml     Exam:  BP (!) 129/44   Pulse 65   Temp 99 °F (37.2 °C) (Oral)   Resp 19   Ht 5' 5\" (1.651 m)   Wt 206 lb 9.1 oz (93.7 kg)   SpO2 95%   BMI 34.38 kg/m²   Head: Normocephalic, atraumatic  Sclera clear  Neck JVD flat  Lungs: normal effort of breathing    Labs:   Recent Labs     01/10/22  1354 01/11/22  0515 01/12/22  0457   WBC 15.3* 12.9* 10.4   HGB 11.8* 11.9* 10.9*   HCT 36.0* 36.7* 33.7*    240 261     Recent Labs     01/10/22  0540 01/11/22  0515   * 134*   K 3.7 3.8    98   CO2 19* 24   BUN 11 14   CREATININE 0.42* 0.36*   CALCIUM 8.8 8.8   AST  --  16   ALT  --  15   BILITOT  --  0.5   ALKPHOS  --  82     No results for input(s): INR in the last 72 hours. No results for input(s): Cherrie Dumas in the last 72 hours. Radiology:  XR CHEST PORTABLE   Final Result   NO ACUTE CARDIOPULMONARY DISEASE      XR FOOT RIGHT (MIN 3 VIEWS)   Final Result      ACUTE FRACTURES OF THE SECOND THROUGH FOURTH (AND PROBABLY FIFTH) METATARSALS, AS NOTED. XR CHEST PORTABLE   Final Result      INFERIOR VENA CAVA APPROACH PACER LEADS IN EXPECTED POSITION. POOR INSPIRATION WITH MILD LEFT BASILAR INFILTRATE/ATELECTASIS. BRONCHOPNEUMONIA SHOULD BE EXCLUDED CLINICALLY. XR ANKLE RIGHT (MIN 3 VIEWS)   Final Result      INCOMPLETELY VISUALIZED METATARSAL FRACTURES, AS NOTED. DEDICATED RIGHT FOOT RADIOGRAPHS ARE SUGGESTED. NONDISPLACED MEDIAL MALLEOLAR FRACTURE. The findings were discussed with the patient's ICU nurse at approximately 7:28 AM on 1/9/2022. CT HEAD WO CONTRAST   Final Result      NO ACUTE INTRACRANIAL PROCESS OR SIGNIFICANT CHANGE FROM PRIOR STUDIES IDENTIFIED. XR CHEST PORTABLE   Final Result   NO ACUTE CARDIOPULMONARY DISEASE. XR CHEST (2 VW)    (Results Pending)     Assessment/Plan:    SSS with sinus pause: S/p dual-chamber pacemaker on 1/11    Covid positive without hypoxia. Vaccinated but not boosted. Does not require further tx at this time.     R ankle and metatarsal fx: management per Ortho    25 minutes total care time, >1/2 in unit/floor time and care coordination     Myron Mccray DO

## 2022-01-12 NOTE — FLOWSHEET NOTE
0800 Assessment complete, see flow sheet. Patient A/O X4, following all commands. Left chest pacer site  dressing clean dry and intact, sling to left arm maintained. Patient resting at present time without complaints. 1940 Report called to [facility], update given, questions answered. 1010 Transferred to [facility] via bed in stable condition with all belongings including glasses, hearing aides, hearing aide  and cell phone. No changes in assessment noted.

## 2022-01-12 NOTE — PROGRESS NOTES
Physical Therapy Missed Treatment   Facility/Department: [facility] MED SURG Y523/T560-68    NAME: Odilon Vidal    : 1940 (78 y.o.)  MRN: [id number]    Account: [de-identified]  Gender: female      PT evaluation and treatment orders received. Chart reviewed. PT evaluation attempted. Pt interview conducted (social/functional recorded). However when PT requesting mobility, pt declining. \"I just need rest. I'm sick. \" Nursing staff notified. Will follow and attempt PT evaluation again at earliest availability.        Esther Parrish, PT, 22 at 4:33 PM

## 2022-01-12 NOTE — PROGRESS NOTES
19:00 Pt remains on droplet plus isolation r/t dx of COVID-19. Handoff report received from ROSHNI KELLY JR. [facility] outside of pts room. 20:00-21:00 Assessments completed (see flowsheets). Pt A&Ox4, follows all commands and able to make needs and wants known. Pt c/o feeling in a mental fog. Pt educated on medications that were administered and side effects during pacemaker placement procedure, pt accepting of information. Pt compliant with left arm sling. Dressing to pacemaker site C/D/I, no redness, swelling or discoloration noted to site. Mike Cruel remains in place a functioning. Right groin dressing remains C/D/I, no swelling, redness or discoloration noted. Walking boot remains in place to RLE per pts request. Pt denies pain or discomfort at this time.  updated on pts condition by this nurse. Per  ok to transfer pt to intermediate/progressive care floor ([facility]) with tele. Safety measures in place. 22:00 Sandee-care and purewick replaced. Pt tolerated well.     00:00-01:00 Assessments completed (see flowsheets), no change in assessment noted. Pt yelled out for nurse. When asked what is needed. Pt requested water, food and pain medication. While this nurse was obtaining items and PRN medication pt requested, pt yelled out for nurse. Pt notified that the nurse will be in shortly once all requested items were obtained. This nurse entered pts room pt pleasant and cooperative with nurse and apologized for yelling out. Pt given small glass of water then applesauce once opened and finished with water, pt able to feed self. PRN pain medication administered per pt request and MD order. Pt took PO medication whole with thin liquids w/o difficulty. Pt demonstrated proper use of call light. Call light with in reach, safety measures in place. 02:00 Pt currently resting in bed with eyes closed. Resp even and unlabored. No s/s of distress noted. Safety measures in place.      04:00 Reassessment completed (see

## 2022-01-12 NOTE — PROGRESS NOTES
INPATIENT PROGRESS NOTES    PATIENT NAME: Rc Georges  MRN: [id number]  SERVICE DATE:  January 12, 2022   SERVICE TIME:  2:47 PM      PRIMARY SERVICE: Pulmonary Disease    CHIEF COMPLAINTS: COVID-19 infection    INTERVAL HPI: Patient seen and examined at bedside, Interval Notes, orders reviewed. Nursing notes noted    Patient report no shortness of breath, no chest pain, she has nausea, did not like her food today, denies chest pain, she is on 2 L O2 saturation 95%, Temperature 99 °F.    Review of system:     GI Abdominal pain No  Skin Rash No    Social History     Tobacco Use    Smoking status: Never Smoker    Smokeless tobacco: Never Used   Substance Use Topics    Alcohol use: Yes     Comment: occasionally     History reviewed. No pertinent family history. OBJECTIVE    Body mass index is 34.38 kg/m². PHYSICAL EXAM:  Vitals:  BP (!) 129/44   Pulse 65   Temp 99 °F (37.2 °C) (Oral)   Resp 19   Ht 5' 5\" (1.651 m)   Wt 206 lb 9.1 oz (93.7 kg)   SpO2 95%   BMI 34.38 kg/m²     General: alert, cooperative, no distress  Head: normocephalic, atraumatic  Eyes:No gross abnormalities. ENT:  MMM no lesions  Neck:  supple and no masses  Chest : Good air movement, no wheezing, rales, nontender, tympanic, left anterior pacemaker with dressing on  Heart[de-identified] Heart sounds are normal.  Regular rate and rhythm without murmur, gallop or rub. ABD:  symmetric, soft, non-tender, no guarding or rebound  Musculoskeletal : no cyanosis, no clubbing and no edema  Neuro:  Grossly normal  Skin: No rashes or nodules noted.   Lymph node:  no cervical nodes  Urology: No [place]   Psychiatric: appropriate    DATA:   Recent Labs     01/11/22  0515 01/12/22  0457   WBC 12.9* 10.4   HGB 11.9* 10.9*   HCT 36.7* 33.7*   MCV 82.8 81.7*    261     Recent Labs     01/10/22  0540 01/10/22  0540 01/11/22  0515   *  --  134*   K 3.7  --  3.8     --  98   CO2 19*  --  24   BUN 11  --  14   CREATININE 0.42*  --  0.36* GLUCOSE 100*  --  121*   CALCIUM 8.8  --  8.8   PROT  --   --  5.7*   LABALBU  --   --  2.9*   BILITOT  --   --  0.5   ALKPHOS  --   --  82   AST  --   --  16   ALT  --   --  15   LABGLOM >60.0   < > >60.0   GFRAA >60.0   < > >60.0   GLOB  --   --  2.8    < > = values in this interval not displayed. MV Settings:          No results for input(s): PHART, DOQ5ZJJ, PO2ART, TEX6JRM, BEART, Y9MLBUAG in the last 72 hours. O2 Device: Nasal cannula  O2 Flow Rate (L/min): 2 L/min    ADULT ORAL NUTRITION SUPPLEMENT; Breakfast, Dinner; Standard High Calorie/High Protein Oral Supplement  ADULT ORAL NUTRITION SUPPLEMENT; Lunch; Frozen Oral Supplement  ADULT DIET; Regular     MEDICATIONS during current hospitalization:    Continuous Infusions:   sodium chloride 75 mL/hr at 01/12/22 0467    sodium chloride      sodium chloride      sodium chloride         Scheduled Meds:   metoprolol succinate  25 mg Oral Daily    polyethylene glycol  17 g Oral Daily    senna  1 tablet Oral Nightly    sodium chloride flush  5-40 mL IntraVENous 2 times per day    sodium chloride flush  5-40 mL IntraVENous 2 times per day    losartan  100 mg Oral Daily    amLODIPine  5 mg Oral Daily    pantoprazole  40 mg Oral QAM AC    levothyroxine  112 mcg Oral Daily    sodium chloride flush  5-40 mL IntraVENous 2 times per day       PRN Meds:sodium chloride flush, sodium chloride, sodium chloride flush, sodium chloride, traMADol **OR** traMADol, ondansetron, acetaminophen, hydrALAZINE, sodium chloride flush, sodium chloride, ondansetron, morphine **OR** morphine    Radiology  XR ANKLE RIGHT (MIN 3 VIEWS)    Result Date: 1/9/2022  XR ANKLE RIGHT (MIN 3 VIEWS): 1/8/2022 CLINICAL HISTORY: Pain after fall. COMPARISON: None available. TECHNIQUE: Portable AP, lateral and oblique radiographs of the left ankle were obtained.  FINDINGS: Best visualized on the AP view, fractures of the second metatarsal and probably third, fourth and fifth metatarsals are noted. Dedicated radiographs of the right foot are suggested. A nondisplaced fracture of the medial malleolus is present. There is no other fracture, dislocation, radiodense foreign bodies, pathologic calcifications, or worrisome bone destruction identified. Mild soft tissue swelling and degenerative changes of the ankle are noted. INCOMPLETELY VISUALIZED METATARSAL FRACTURES, AS NOTED. DEDICATED RIGHT FOOT RADIOGRAPHS ARE SUGGESTED. NONDISPLACED MEDIAL MALLEOLAR FRACTURE. The findings were discussed with the patient's ICU nurse at approximately 7:28 AM on 1/9/2022. XR FOOT RIGHT (MIN 3 VIEWS)    Result Date: 1/9/2022  XR FOOT RIGHT (MIN 3 VIEWS) : 1/9/2022 CLINICAL HISTORY:  fx metatarsal . COMPARISON: Right ankle radiographs 1/8/2022. TECHNIQUE: AP, lateral, and oblique radiographs of the right foot were obtained. FINDINGS: A minimally comminuted fracture of the proximal third of the right third metatarsal is displaced approximately 5 to 6 mm with nearly 1 cm of overriding and foreshortening. An oblique fracture of the distal third of the right second metatarsal is present, with approximately 3 to 4 mm of lateral displacement. There is a nondisplaced fracture of the base of the right fourth metatarsal (and probably fifth metatarsal). A nondisplaced medial malleolar fracture appears unchanged. ACUTE FRACTURES OF THE SECOND THROUGH FOURTH (AND PROBABLY FIFTH) METATARSALS, AS NOTED. CT HEAD WO CONTRAST    Result Date: 1/8/2022  CT HEAD WO CONTRAST : 1/8/2022 CLINICAL HISTORY: Dizziness. COMPARISON: Head MRI 1/30/2012 and head CT 12/16/2011. TECHNIQUE: Spiral unenhanced images were obtained of the head, with routine multiplanar reconstructions performed. All CT scans at this facility use dose modulation, iterative reconstruction, and/or weight based dosing when appropriate to reduce radiation dose to as low as reasonably achievable.  FINDINGS: There is no intracranial hemorrhage, mass effect, midline shift, extra-axial collection, evidence of hydrocephalus, recent ischemic infarct, or skull fracture identified. Mild to moderate generalized cerebral volume loss and mild white matter changes have mildly progressed from the prior studies. The mastoid air cells and visualized paranasal sinuses are essentially clear. NO ACUTE INTRACRANIAL PROCESS OR SIGNIFICANT CHANGE FROM PRIOR STUDIES IDENTIFIED. XR CHEST PORTABLE    Result Date: 1/12/2022  EXAMINATION: XR CHEST PORTABLE CLINICAL HISTORY: PACEMAKER PLACEMENT COMPARISONS: JANUARY 9, 2022 FINDINGS: Pacemaker generator and wires unchanged. Osseous structures intact. Cardiopericardial silhouette normal. Lungs clear. NO ACUTE CARDIOPULMONARY DISEASE    XR CHEST PORTABLE    Result Date: 1/9/2022  XR CHEST PORTABLE : 1/9/2022 CLINICAL HISTORY:  Pacemaker placement and rule out pneumothorax . COMPARISON: 1/8/2022. TECHNIQUE: A portable upright AP radiograph of the chest was obtained. FINDINGS: The heart is mildly enlarged with an inferior vena cava approach pacer lead with its tip overlying the cardiac apex. A poor inspiratory volume is present with mild left basilar infiltrate/atelectasis. Bronchopneumonia should be excluded clinically. There is no sizable pleural effusion, gross vascular congestion, pneumothorax, or displaced fractures identified. INFERIOR VENA CAVA APPROACH PACER LEADS IN EXPECTED POSITION. POOR INSPIRATION WITH MILD LEFT BASILAR INFILTRATE/ATELECTASIS. BRONCHOPNEUMONIA SHOULD BE EXCLUDED CLINICALLY. XR CHEST PORTABLE    Result Date: 1/8/2022  EXAMINATION: XR CHEST PORTABLE CLINICAL HISTORY: WEAKNESS COMPARISONS: DECEMBER 15, 2011 FINDINGS: Osseous structures intact. Cardiopericardial silhouette normal. Pulmonary vasculature normal. Lungs clear. NO ACUTE CARDIOPULMONARY DISEASE.               IMPRESSION AND SUGGESTION:  Patient is at risk due to   · COVID-19 pneumonia, currently on 2 L O2 saturation 95%,   · Sinus pauses status post pacemaker      Recommendation  · Patient requiring 2 L O2, with sat 95%, will try on room air if patient is desaturating below 94% then will start dexamethasone 6 mg daily for 10 days  · We will obtain CT chest,   · consider remdesivir, will review after CT chest and room air challenge  · Maintain euvolemic, and avoid volume overload  · Check D-dimer      DW RN       I spent 30 min with this patient, greater the 50% of this time was spent in counseling and/or coordinating of care.     Electronically signed by Inna Moseley MD,  [facility]   on 1/12/2022 at 2:47 PM

## 2022-01-12 NOTE — PROGRESS NOTES
CARDIOLOGY [facility] PROGRESS NOTE         1/12/2022      Dee Colon    [id number]  1940    Rounding MD: Janae Hoover MD ,[facility] [place]    Primary Cardiologist:  Elder Bae MD NPH Wei      SUBJECTIVE:    Notes reviewed. COVID positive still in isolation. Doing well overall. Post permanent pacemaker implant yesterday by Dr Yoni Romero. Telemetry shows V paced with Atrial lead dysfunction. No new complaints. Still very tired and nauseated. No CP or SOB. Discharge planning, lives alone, probably will need [facility] or [facility] temporary care. Cardiac and general ROS otherwise negative and unchanged. ASSESSMENT:    1. Syncope  2. Asystole, SSS / CHB, recent temporary PM, now removed. 3. 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 Recurrent falls  5. Lower extremity fractures post casting. 6. Accelerated junctional rhythm by Holter monitor 11 2019.  7. Abnormal resting electrocardiogram (first-degree AV block, poor with ventricular progression, left axis deviation). 8. Negative cardiac catheterization for significant coronary artery disease 12/6/2019.  9. Abnormal Lexiscan Myoview perfusion stress test for mild anterior lateral apical ischemia, November 2019. 10. Abdominal aortic aneurysm, newly diagnosed, 4.5 cm, none by ultrasound 5/2021. 11. Prior negative stress test 1996. 12. Prior negative echocardiogram 1996 and November 2019. 13. Negative carotid ultrasound for significant carotid artery disease, ICAs less than 50%, November 2019. 14. History of varicose veins. 15. Obstructive sleep apnea not on CPAP. 16. Hypertension  17. Hyperlipidemia  18. Lymphedema  19. Peptic ulcer diseaseDegenerative joint disease with bilateral knee discomfort  20. Prior hysterectomy  21. Prior cholecystectomy  22. Family history of cardiovascular (congestive heart failure, coronary bypass, pacemaker).   [place] 38 pg    MCHC 32.5 (L) 33.0 - 37.0 %    RDW 13.9 11.5 - 14.5 %    Platelets 482 978 - 834 K/uL    Neutrophils % 77.5 %    Lymphocytes % 9.3 %    Monocytes % 9.9 %    Eosinophils % 2.7 %    Basophils % 0.6 %    Neutrophils Absolute 8.1 (H) 1.4 - 6.5 K/uL    Lymphocytes Absolute 1.0 1.0 - 4.8 K/uL    Monocytes Absolute 1.0 (H) 0.2 - 0.8 K/uL    Eosinophils Absolute 0.3 0.0 - 0.7 K/uL    Basophils Absolute 0.1 0.0 - 0.2 K/uL       Cardiac Cath  \12/2019  1.  Normal cardiac catheterization. 2.  Normal coronary cineangiography without evidence of significant coronary artery disease as noted above. 4.  Normal LV Function, LVEF 60%  4.  Normal hemodynamics. 5.  No significant valvular heart disease. EKG:   Sinus rhythm with 1st degree AV block, rate 77. Incomplete left bundle branch block   Left ventricular hypertrophy with repolarization abnormality   Abnormal ECG       Telemetry:  V paced with Atrial lead dysfunction noted.             Leopoldo Ken, MD ,[facility]

## 2022-01-13 ENCOUNTER — APPOINTMENT (OUTPATIENT)
Dept: CT IMAGING | Age: 82
DRG: 242 | End: 2022-01-13
Payer: MEDICARE

## 2022-01-13 LAB
ALBUMIN SERPL-MCNC: 2.5 G/DL (ref 3.5–4.6)
ALP BLD-CCNC: 105 U/L (ref 40–130)
ALT SERPL-CCNC: 31 U/L (ref 0–33)
ANION GAP SERPL CALCULATED.3IONS-SCNC: 10 MEQ/L (ref 9–15)
AST SERPL-CCNC: 20 U/L (ref 0–35)
BASOPHILS ABSOLUTE: 0.1 K/UL (ref 0–0.2)
BASOPHILS RELATIVE PERCENT: 0.5 %
BILIRUB SERPL-MCNC: 0.4 MG/DL (ref 0.2–0.7)
BUN BLDV-MCNC: 11 MG/DL (ref 8–23)
CALCIUM SERPL-MCNC: 8.6 MG/DL (ref 8.5–9.9)
CHLORIDE BLD-SCNC: 99 MEQ/L (ref 95–107)
CO2: 25 MEQ/L (ref 20–31)
CREAT SERPL-MCNC: 0.34 MG/DL (ref 0.5–0.9)
D DIMER: 2.61 MG/L FEU (ref 0–0.5)
EOSINOPHILS ABSOLUTE: 0.5 K/UL (ref 0–0.7)
EOSINOPHILS RELATIVE PERCENT: 5.2 %
GFR AFRICAN AMERICAN: >60
GFR NON-AFRICAN AMERICAN: >60
GLOBULIN: 2.9 G/DL (ref 2.3–3.5)
GLUCOSE BLD-MCNC: 99 MG/DL (ref 70–99)
HCT VFR BLD CALC: 33.6 % (ref 37–47)
HEMOGLOBIN: 10.9 G/DL (ref 12–16)
LYMPHOCYTES ABSOLUTE: 1 K/UL (ref 1–4.8)
LYMPHOCYTES RELATIVE PERCENT: 10.7 %
MAGNESIUM: 1.8 MG/DL (ref 1.7–2.4)
MCH RBC QN AUTO: 26.5 PG (ref 27–31.3)
MCHC RBC AUTO-ENTMCNC: 32.4 % (ref 33–37)
MCV RBC AUTO: 81.9 FL (ref 82–100)
MONOCYTES ABSOLUTE: 1.3 K/UL (ref 0.2–0.8)
MONOCYTES RELATIVE PERCENT: 13.7 %
NEUTROPHILS ABSOLUTE: 6.6 K/UL (ref 1.4–6.5)
NEUTROPHILS RELATIVE PERCENT: 69.9 %
PDW BLD-RTO: 14.3 % (ref 11.5–14.5)
PLATELET # BLD: 240 K/UL (ref 130–400)
POTASSIUM SERPL-SCNC: 3.6 MEQ/L (ref 3.4–4.9)
RBC # BLD: 4.1 M/UL (ref 4.2–5.4)
SODIUM BLD-SCNC: 134 MEQ/L (ref 135–144)
TOTAL PROTEIN: 5.4 G/DL (ref 6.3–8)
WBC # BLD: 9.4 K/UL (ref 4.8–10.8)

## 2022-01-13 PROCEDURE — 83735 ASSAY OF MAGNESIUM: CPT

## 2022-01-13 PROCEDURE — 6370000000 HC RX 637 (ALT 250 FOR IP): Performed by: INTERNAL MEDICINE

## 2022-01-13 PROCEDURE — 2580000003 HC RX 258: Performed by: INTERNAL MEDICINE

## 2022-01-13 PROCEDURE — 97162 PT EVAL MOD COMPLEX 30 MIN: CPT

## 2022-01-13 PROCEDURE — 2500000003 HC RX 250 WO HCPCS

## 2022-01-13 PROCEDURE — 97535 SELF CARE MNGMENT TRAINING: CPT

## 2022-01-13 PROCEDURE — 85379 FIBRIN DEGRADATION QUANT: CPT

## 2022-01-13 PROCEDURE — 92953 TEMPORARY EXTERNAL PACING: CPT

## 2022-01-13 PROCEDURE — 6360000002 HC RX W HCPCS: Performed by: INTERNAL MEDICINE

## 2022-01-13 PROCEDURE — 99232 SBSQ HOSP IP/OBS MODERATE 35: CPT | Performed by: INTERNAL MEDICINE

## 2022-01-13 PROCEDURE — 97166 OT EVAL MOD COMPLEX 45 MIN: CPT

## 2022-01-13 PROCEDURE — 99233 SBSQ HOSP IP/OBS HIGH 50: CPT | Performed by: INTERNAL MEDICINE

## 2022-01-13 PROCEDURE — 6360000004 HC RX CONTRAST MEDICATION: Performed by: INTERNAL MEDICINE

## 2022-01-13 PROCEDURE — 36415 COLL VENOUS BLD VENIPUNCTURE: CPT

## 2022-01-13 PROCEDURE — 2580000003 HC RX 258

## 2022-01-13 PROCEDURE — 2060000000 HC ICU INTERMEDIATE R&B

## 2022-01-13 PROCEDURE — 2700000000 HC OXYGEN THERAPY PER DAY

## 2022-01-13 PROCEDURE — 71275 CT ANGIOGRAPHY CHEST: CPT

## 2022-01-13 PROCEDURE — 85025 COMPLETE CBC W/AUTO DIFF WBC: CPT

## 2022-01-13 PROCEDURE — 80053 COMPREHEN METABOLIC PANEL: CPT

## 2022-01-13 RX ADMIN — Medication 10 ML: at 20:13

## 2022-01-13 RX ADMIN — ENOXAPARIN SODIUM 30 MG: 100 INJECTION SUBCUTANEOUS at 09:50

## 2022-01-13 RX ADMIN — AMLODIPINE BESYLATE 5 MG: 5 TABLET ORAL at 09:50

## 2022-01-13 RX ADMIN — ONDANSETRON 4 MG: 2 INJECTION INTRAMUSCULAR; INTRAVENOUS at 06:34

## 2022-01-13 RX ADMIN — MORPHINE SULFATE 4 MG: 4 INJECTION INTRAVENOUS at 03:12

## 2022-01-13 RX ADMIN — SENNOSIDES 8.6 MG: 8.6 TABLET, COATED ORAL at 23:06

## 2022-01-13 RX ADMIN — LOSARTAN POTASSIUM 100 MG: 100 TABLET, FILM COATED ORAL at 09:50

## 2022-01-13 RX ADMIN — METOPROLOL SUCCINATE 25 MG: 25 TABLET, FILM COATED, EXTENDED RELEASE ORAL at 09:50

## 2022-01-13 RX ADMIN — IOPAMIDOL 100 ML: 612 INJECTION, SOLUTION INTRAVENOUS at 18:06

## 2022-01-13 RX ADMIN — LEVOTHYROXINE SODIUM 112 MCG: 112 TABLET ORAL at 06:35

## 2022-01-13 RX ADMIN — ENOXAPARIN SODIUM 30 MG: 100 INJECTION SUBCUTANEOUS at 20:12

## 2022-01-13 RX ADMIN — SODIUM CHLORIDE, PRESERVATIVE FREE 10 ML: 5 INJECTION INTRAVENOUS at 06:35

## 2022-01-13 RX ADMIN — POLYETHYLENE GLYCOL 3350 17 G: 17 POWDER, FOR SOLUTION ORAL at 09:51

## 2022-01-13 RX ADMIN — PANTOPRAZOLE SODIUM 40 MG: 40 TABLET, DELAYED RELEASE ORAL at 06:35

## 2022-01-13 ASSESSMENT — ENCOUNTER SYMPTOMS
GASTROINTESTINAL NEGATIVE: 1
RESPIRATORY NEGATIVE: 1

## 2022-01-13 ASSESSMENT — PAIN SCALES - GENERAL
PAINLEVEL_OUTOF10: 8
PAINLEVEL_OUTOF10: 0

## 2022-01-13 ASSESSMENT — PAIN DESCRIPTION - LOCATION
LOCATION: ANKLE
LOCATION: ANKLE;FOOT

## 2022-01-13 ASSESSMENT — PAIN DESCRIPTION - PAIN TYPE
TYPE: ACUTE PAIN
TYPE: ACUTE PAIN

## 2022-01-13 ASSESSMENT — PAIN DESCRIPTION - DESCRIPTORS: DESCRIPTORS: ACHING;DISCOMFORT

## 2022-01-13 ASSESSMENT — PAIN DESCRIPTION - ORIENTATION
ORIENTATION: RIGHT
ORIENTATION: RIGHT

## 2022-01-13 NOTE — PROGRESS NOTES
Physical Therapy Med Surg Daily Treatment Note  Facility/Department: [facility] TELEMETRY  Room: [facility]/A080-17       NAME: Al Sims  : 1940 (78 y.o.)  MRN: [id number]  CODE STATUS: Full Code    Date of Service: 2022    Patient Diagnosis(es): Dehydration [E86.0]  Sick sinus syndrome ([address] [I49.5]  Symptomatic bradycardia [R00.1]  Closed fracture of right ankle, initial encounter [S82.891A]  COVID-19 virus infection [U07.1]  Syncope and collapse [R55]   Chief Complaint   Patient presents with    Dizziness    Emesis     Patient Active Problem List    Diagnosis Date Noted    Dehydration     COVID-19 virus infection     Sick sinus syndrome ([address]     Symptomatic bradycardia     Syncope and collapse     Cellulitis 2021    Edema of both legs 2021    Urge incontinence of urine     Fecal smearing     Abdominal aortic aneurysm, without rupture ([address] 2021    Decreased ROM of neck 2020    Orthostatic dizziness 2019    Imbalance 2019    Immune thrombocytopenic purpura ([address] 2019    Thrombocytopenia, unspecified ([address] 2019    Essential (primary) hypertension 2019    Diarrhea 2018    Abnormal auditory perception of both ears 2018    Tinnitus, bilateral 2018    Other specified soft tissue disorders 09/15/2017    PVD (posterior vitreous detachment), both eyes 2017    Ptosis of both eyelids 2017    Injury of tendon of rotator cuff 2017    Age-related macular degeneration, dry, both eyes 2016    Myogenic ptosis of eyelid of both eyes 2016    Open angle with borderline findings, low risk, bilateral 2016    Vitreous degeneration 2015    Nausea alone 2015    Sensorineural hearing loss of both ears 2013    Hypothyroid 2013    Mixed hyperlipidemia 2013    Benign neoplasm of colon 10/30/2003    Esophageal reflux 10/22/2002    Generalized osteoarthrosis, unspecified site 10/22/2002    Overweight and obesity 10/22/2002        Past Medical History:   Diagnosis Date    Abdominal aortic aneurysm without rupture (HCC)     Age-related macular degeneration     Dehydration     Esophageal reflux     Falls frequently 01/02/2022    Hearing loss     High blood pressure     HIGH CHOLESTEROL     Hyperlipidemia     Hypothyroidism     Imbalance     Incontinence     Nausea     Obesity     Orthostatic dizziness     Osteoarthrosis     Overactive bladder     Thyroid disease      Past Surgical History:   Procedure Laterality Date    CATARACT REMOVAL      CHOLECYSTECTOMY      CHOLECYSTECTOMY      EYE SURGERY Bilateral     cataract    HYSTERECTOMY      IR NEUROSTIMULATOR PLACEMENT  07/28/2021    NERVE SURGERY N/A 7/28/2021    STAGE 1 & STAGE 2 INTERSTIM performed by Nghia Hernandez MD at [address] [place] [address]  Restrictions/Precautions: Weight Bearing; Fall Risk  Lower Extremity Weight Bearing Restrictions  Right Lower Extremity Weight Bearing: Weight Bearing As Tolerated (in walking boot)  Position Activity Restriction  Other position/activity restrictions: New pacemaker placed 1/11/22    SUBJECTIVE   General  Chart Reviewed: Yes  Family / Caregiver Present: No  Subjective  Subjective: \"I feel nauseous, I want to go back to bed. \"    Pre-Session Pain Report  Pre Treatment Pain Screening  Pain at present: 8  Scale Used: Numeric Score  Intervention List: Patient able to continue with treatment  Pain Screening  Patient Currently in Pain: Yes       Post-Session Pain Report  Pain Assessment  Pain Assessment: 0-10  Pain Level: 8  Pain Type: Acute pain  Pain Location: Ankle  Pain Orientation: Right         OBJECTIVE        Bed mobility  Sit to Supine: Maximum assistance  Comment: [place], increased cues for technique and initiation.     Transfers  Sit to Stand: Maximum Assistance;2 Person Assistance  Stand to sit: Maximum Assistance  Bed to Chair: Maximum assistance;2 Person Assistance  Comment: Pt requires +2 assist to complete transfer from chair back to bed, pt using [address], assistance needed for weight shifting and advancing BLE. increased cues for sequencing weight shifting and utilizing UE's on [address]. increased time and effort to complete. Activity Tolerance  Activity Tolerance: Patient limited by fatigue;Patient limited by pain          ASSESSMENT   Assessment: pt limited by [facility] and pain., Max cues needed for sequencing during transfers. pt able to use WW to stand and take pivot steps back to bed with Max A +2     Discharge Recommendations:  Continue to assess pending progress,Patient would benefit from continued therapy after discharge    Goals  Long term goals  Long term goal 1: Pt to complete bed mobility with CGA  Long term goal 2: Pt to complete transfers with Poly  Long term goal 3: Pt to ambualte 10ft with LRD and Poly    PLAN    Times per week: 5-7  Times per day:  (QB/1-2)  Safety Devices  Type of devices: All fall risk precautions in place,Call light within reach,Bed alarm in place,Left in bed,Nurse notified     [facility] ([address] [place] [address]) [address] Inpatient Mobility Raw Score : 9      Therapy Time   Individual   Time In 1134   Time Out 1157   Minutes 23     BM/trsf: [address] PTA, 01/13/22 at 1:50 PM         Definitions for assistance levels  Independent = pt does not require any physical supervision or assistance from another person for activity completion. Device may be needed.   Stand by assistance = pt requires verbal cues or instructions from another person, close to but not touching, to perform the activity  Minimal assistance= pt performs 75% or more of the activity; assistance is required to complete the activity  Moderate assistance= pt performs 50% of the activity; assistance is required to complete the activity  Maximal assistance = pt performs 25% of the activity; assistance is required to complete the activity  Dependent = pt requires total physical assistance to accomplish the task

## 2022-01-13 NOTE — PROGRESS NOTES
Physical Therapy Med Surg Initial Assessment  Facility/Department: [facility] TELEMETRY  [facility]       NAME: Courtney Schwarz  : 1940 (78 y.o.)  MRN: [id number]  CODE STATUS: Full Code    Date of Service: 2022    Patient Diagnosis(es): Dehydration [E86.0]  Sick sinus syndrome ([address] [I49.5]  Symptomatic bradycardia [R00.1]  Closed fracture of right ankle, initial encounter [S82.891A]  COVID-19 virus infection [U07.1]  Syncope and collapse [R55]   Chief Complaint   Patient presents with    Dizziness    Emesis     Patient Active Problem List    Diagnosis Date Noted    Dehydration     COVID-19 virus infection     Sick sinus syndrome ([address]     Symptomatic bradycardia     Syncope and collapse     Cellulitis 2021    Edema of both legs 2021    Urge incontinence of urine     Fecal smearing     Abdominal aortic aneurysm, without rupture ([address] 2021    Decreased ROM of neck 2020    Orthostatic dizziness 2019    Imbalance 2019    Immune thrombocytopenic purpura ([address] 2019    Thrombocytopenia, unspecified ([address] 2019    Essential (primary) hypertension 2019    Diarrhea 2018    Abnormal auditory perception of both ears 2018    Tinnitus, bilateral 2018    Other specified soft tissue disorders 09/15/2017    PVD (posterior vitreous detachment), both eyes 2017    Ptosis of both eyelids 2017    Injury of tendon of rotator cuff 2017    Age-related macular degeneration, dry, both eyes 2016    Myogenic ptosis of eyelid of both eyes 2016    Open angle with borderline findings, low risk, bilateral 2016    Vitreous degeneration 2015    Nausea alone 2015    Sensorineural hearing loss of both ears 2013    Hypothyroid 2013    Mixed hyperlipidemia 2013    Benign neoplasm of colon 10/30/2003    Esophageal reflux 10/22/2002    Generalized osteoarthrosis, unspecified site 10/22/2002    Overweight and obesity 10/22/2002        Past Medical History:   Diagnosis Date    Abdominal aortic aneurysm without rupture (HCC)     Age-related macular degeneration     Dehydration     Esophageal reflux     Falls frequently 01/02/2022    Hearing loss     High blood pressure     HIGH CHOLESTEROL     Hyperlipidemia     Hypothyroidism     Imbalance     Incontinence     Nausea     Obesity     Orthostatic dizziness     Osteoarthrosis     Overactive bladder     Thyroid disease      Past Surgical History:   Procedure Laterality Date    CATARACT REMOVAL      CHOLECYSTECTOMY      CHOLECYSTECTOMY      EYE SURGERY Bilateral     cataract    HYSTERECTOMY      IR NEUROSTIMULATOR PLACEMENT  07/28/2021    NERVE SURGERY N/A 7/28/2021    STAGE 1 & STAGE 2 INTERSTIM performed by Romayne Albright, MD at [address] [facility] OR       Chart Reviewed: Yes  Patient assessed for rehabilitation services?: Yes  Family / Caregiver Present: No  General Comment  Comments: Pt resting in bed - agreeable to PT evaluation    Restrictions:  Restrictions/Precautions: Weight Bearing,Fall Risk  Lower Extremity Weight Bearing Restrictions  Right Lower Extremity Weight Bearing: Weight Bearing As Tolerated (in walking boot)  Position Activity Restriction  Other position/activity restrictions: New pacemaker placed 1/11/22     SUBJECTIVE: Subjective: \"I'm so nauseated. \"    Pain  Pre Treatment Pain Screening  Pain at present: 8  Scale Used: Numeric Score  Intervention List: Patient able to continue with treatment;Nurse/physician notified; Patient declined any intervention  Comments / Details: R LE    Post Treatment Pain Screening:   Pain Assessment  Pain Assessment:  (unchanged)    Prior Level of Function:  Social/Functional History  Lives With: Alone  Type of Home: House  Home Layout: One level  Home Access: Stairs to enter with rails  Entrance Stairs - Number of Steps: 3  Bathroom Shower/Tub: Tub/Shower unit,Walk-in shower  Bathroom Equipment:  (Can borrow a shower chair)  Home Equipment: Rolling walker,Cane  ADL Assistance: Independent  Homemaking Assistance: Independent  Ambulation Assistance: Independent ([address] usually, however recently used [employer])  Transfer Assistance: Independent  Active : Yes    OBJECTIVE:   Vision Exceptions: Wears glasses at all times  Hearing Exceptions: Hard of hearing/hearing concerns;Bilateral hearing aid    Cognition:  Overall Orientation Status: Within Functional Limits  Follows Commands: Within Functional Limits    Observation/Palpation  Observation: Pt alert and attentive - anxious. Sling present for comfort and reminder of PPM precautions; educated on PPM precautions and adjusted for fit but removed for mobility. ROM:  RLE PROM: WFL  RLE General PROM: ankle NT d/t boot donned  LLE PROM: WFL    Strength:  Strength RLE  Comment: grossly 2-/5  Strength LLE  Comment: Grossly 2-/5  Strength Other  Other: Tunk strength grossly 2/5 functionally assessed    Neuro:  Balance  Sitting - Static: Good;Fair  Sitting - Dynamic: Fair  Standing - Static: Fair  Standing - Dynamic: Poor     Motor Control  Gross Motor?: WFL  Sensation  Overall Sensation Status: WFL    Bed mobility  Supine to Sit: Moderate assistance;Maximum assistance  Comment: hand over hand assist to complete all aspects of transition. Pt requires assist to initiate mobility B LEs. Increased time to complete. Cues for maintaining precautions for pacemaker throughout. Transfers  Sit to Stand: Maximum Assistance;2 Person Assistance (bed elevated)  Stand to sit: Maximum Assistance  Bed to Chair: Maximum assistance;2 Person Assistance (+2 for safety during SPT)  Stand Pivot Transfers: 2 Person Assistance;Maximum Assistance  Comment: Pt requires +2 assist initially as well as hand over hand assist for set up and safety prior and during transition. SPT complete following initial STS d/t pt's inability to advance LEs.  MaxA +2 to complete safely. Ambulation 1  Surface: level tile  Device: Rolling Walker  Other Apparatus: O2  Assistance: Maximum assistance;2 Person assistance  Quality of Gait: Requiring step by step assist to work through advancing LEs. Pt however unable to bear weight through R LE. Distance: 1 step forward  Comments: Multiple attempts. Stairs/Curb  Stairs?: No         Activity Tolerance  Activity Tolerance: Patient Tolerated treatment well  Activity Tolerance: maintained 96% SaO2 throughout. PT Education  PT Education: Goals;PT Role;Plan of Care    ASSESSMENT:   Body structures, Functions, Activity limitations: Decreased functional mobility ; Decreased ADL status; Decreased strength;Decreased safe awareness;Decreased endurance;Decreased balance;Decreased ROM; Increased pain;Decreased coordination  Decision Making: Medium Complexity  History: High  Exam: High  Clinical Presentation: Med    Prognosis: Good  Barriers to Learning: [place]    DISCHARGE RECOMMENDATIONS:  Discharge Recommendations: Continue to assess pending progress,Patient would benefit from continued therapy after discharge    Assessment: Continued PT indicated to progress mobility and faciltiate DC at highest level of indep and safety. Unsafe to return home and would benefit from therapy stay prior to DC home. REQUIRES PT FOLLOW UP: Yes      PLAN OF CARE:  Plan  Times per week: 5-7  Times per day:  (QB/1-2)  Current Treatment Recommendations: Strengthening,ROM,Balance Training,Functional Mobility Training,Transfer Daniela Hernandez Re-education,Patient/Caregiver Education & Training,Gait Training,Home Exercise Program,Safety Education & Training,Equipment Evaluation, Education, & procurement,Positioning,Modalities,Manual Therapy - Soft Tissue Mobilization  Safety Devices  Type of devices:  All fall risk precautions in place,Chair alarm in place,Call light within reach    Goals:  Long term goals  Long term goal 1: Pt to complete bed mobility with CGA  Long term goal 2: Pt to complete transfers with Poly  Long term goal 3: Pt to ambualte 10ft with LRD and Poly    AMPAC (6 CLICK) Henry. Mckenna [facility] 28 Inpatient Mobility Raw Score : 8     Therapy Time:   Individual   Time In 1021   Time Out 1053   Minutes 32   Timed Code Treatment Minutes: 8 Minutes (transfers 6min; gait 2min)       Raymond Levin, PT, 01/13/22 at 11:23 AM         Definitions for assistance levels  Independent = pt does not require any physical supervision or assistance from another person for activity completion. Device may be needed.   Stand by assistance = pt requires verbal cues or instructions from another person, close to but not touching, to perform the activity  Minimal assistance= pt performs 75% or more of the activity; assistance is required to complete the activity  Moderate assistance= pt performs 50% of the activity; assistance is required to complete the activity  Maximal assistance = pt performs 25% of the activity; assistance is required to complete the activity  Dependent = pt requires total physical assistance to accomplish the task

## 2022-01-13 NOTE — PROGRESS NOTES
CARDIOLOGY [address] [facility] PROGRESS NOTE         1/13/2022      Marco A Bagley    [id number]  1940    Rounding MD: Josh Tan MD ,[address] [facility]    Primary Cardiologist:  MD CATHRYN Reina      SUBJECTIVE:    Notes reviewed. COVID positive still in isolation. Doing well overall. Post permanent pacemaker implant Tuesday by Dr Lorena Eubanks. Telemetry shows V paced with Atrial lead dysfunction. Pending PM revision later today with Dr Lorena Eubanks. No new complaints. Still very tired and nauseated. No CP or SOB. Discharge planning, lives alone, probably will need [facility] or [facility] temporary care. Cardiac and general ROS otherwise negative and unchanged. ASSESSMENT:    1. Syncope  2. Asystole, SSS / CHB, recent temporary PM, now removed. 3. Post PPM 1/11/22, probable Atrial lead dysfunction (([employer]ley XTDR MRI model number [id number], serial number [id number])  4. Recurrent falls  5. Lower extremity fractures post casting. 6. Accelerated junctional rhythm by Holter monitor 11 2019.  7. Abnormal resting electrocardiogram (first-degree AV block, poor with ventricular progression, left axis deviation). 8. Negative cardiac catheterization for significant coronary artery disease 12/6/2019.  9. Abnormal Lexiscan Myoview perfusion stress test for mild anterior lateral apical ischemia, November 2019. 10. Abdominal aortic aneurysm, newly diagnosed, 4.5 cm, none by ultrasound 5/2021. 11. Prior negative stress test 1996. 12. Prior negative echocardiogram 1996 and November 2019. 13. Negative carotid ultrasound for significant carotid artery disease, ICAs less than 50%, November 2019. 14. History of varicose veins. 15. Obstructive sleep apnea not on CPAP. 16. Hypertension  17. Hyperlipidemia  18. Lymphedema  19. Peptic ulcer diseaseDegenerative joint disease with bilateral knee discomfort  20. Prior hysterectomy  21. Prior cholecystectomy  22.  Family history of cardiovascular (congestive heart failure, coronary bypass, pacemaker). 23. COVID POSITIVE STATUS      PLAN:    Supportive Cardiac Care. ID and General Medical care. Orthopedic care. GI care. PPM revision with Dr Christophe Hatch today for atrial lead re implanted. Chest Xray today. Lovenox or Heparin coverage per others. Hold for now til PPM re addressed by EP. Serial labs. DC planning, HHC or Temporary NHome placement. Further orders to come  See orders. -------------------------------------------------------------------------------------------    OBJECTIVE:     MEDICATIONS:     Scheduled Meds:   enoxaparin  30 mg SubCUTAneous BID    metoprolol succinate  25 mg Oral Daily    senna  1 tablet Oral Nightly    sodium chloride flush  5-40 mL IntraVENous 2 times per day    sodium chloride flush  5-40 mL IntraVENous 2 times per day    losartan  100 mg Oral Daily    amLODIPine  5 mg Oral Daily    pantoprazole  40 mg Oral QAM AC    levothyroxine  112 mcg Oral Daily    sodium chloride flush  5-40 mL IntraVENous 2 times per day     Continuous Infusions:   sodium chloride      sodium chloride      sodium chloride       PRN Meds:sodium chloride flush, sodium chloride, sodium chloride flush, sodium chloride, traMADol **OR** traMADol, ondansetron, acetaminophen, hydrALAZINE, sodium chloride flush, sodium chloride, ondansetron, morphine **OR** morphine    PHYSICAL EXAM:    CURRENT VITALS: BP (!) 142/54   Pulse 67   Temp 97.7 °F (36.5 °C) (Oral)   Resp 18   Ht 5' 5\" (1.651 m)   Wt 206 lb 9.1 oz (93.7 kg)   SpO2 95%   BMI 34.38 kg/m²     Not Formally / directly examined due to COVID protocol. Discussed with staff and communicated with patient via phone.        Data:       LABS:  Recent Results (from the past 24 hour(s))   D-Dimer, Quantitative    Collection Time: 01/12/22  4:06 PM   Result Value Ref Range    D-Dimer, Quant 2.16 (HH) 0.00 - 0.50 mg/L FEU   CBC Auto Differential    Collection Time: 01/13/22  5:10 AM   Result Value Ref Range WBC 9.4 4.8 - 10.8 K/uL    RBC 4.10 (L) 4.20 - 5.40 M/uL    Hemoglobin 10.9 (L) 12.0 - 16.0 g/dL    Hematocrit 33.6 (L) 37.0 - 47.0 %    MCV 81.9 (L) 82.0 - 100.0 fL    MCH 26.5 (L) 27.0 - 31.3 pg    MCHC 32.4 (L) 33.0 - 37.0 %    RDW 14.3 11.5 - 14.5 %    Platelets 906 500 - 065 K/uL    Neutrophils % 69.9 %    Lymphocytes % 10.7 %    Monocytes % 13.7 %    Eosinophils % 5.2 %    Basophils % 0.5 %    Neutrophils Absolute 6.6 (H) 1.4 - 6.5 K/uL    Lymphocytes Absolute 1.0 1.0 - 4.8 K/uL    Monocytes Absolute 1.3 (H) 0.2 - 0.8 K/uL    Eosinophils Absolute 0.5 0.0 - 0.7 K/uL    Basophils Absolute 0.1 0.0 - 0.2 K/uL   Comprehensive Metabolic Panel    Collection Time: 01/13/22  5:10 AM   Result Value Ref Range    Sodium 134 (L) 135 - 144 mEq/L    Potassium 3.6 3.4 - 4.9 mEq/L    Chloride 99 95 - 107 mEq/L    CO2 25 20 - 31 mEq/L    Anion Gap 10 9 - 15 mEq/L    Glucose 99 70 - 99 mg/dL    BUN 11 8 - 23 mg/dL    CREATININE 0.34 (L) 0.50 - 0.90 mg/dL    GFR Non-African American >60.0 >60    GFR  >60.0 >60    Calcium 8.6 8.5 - 9.9 mg/dL    Total Protein 5.4 (L) 6.3 - 8.0 g/dL    Albumin 2.5 (L) 3.5 - 4.6 g/dL    Total Bilirubin 0.4 0.2 - 0.7 mg/dL    Alkaline Phosphatase 105 40 - 130 U/L    ALT 31 0 - 33 U/L    AST 20 0 - 35 U/L    Globulin 2.9 2.3 - 3.5 g/dL   Magnesium    Collection Time: 01/13/22  5:10 AM   Result Value Ref Range    Magnesium 1.8 1.7 - 2.4 mg/dL   D-Dimer, Quantitative    Collection Time: 01/13/22  8:15 AM   Result Value Ref Range    D-Dimer, Quant 2.61 (HH) 0.00 - 0.50 mg/L FEU       Cardiac Cath  \12/2019  1.  Normal cardiac catheterization. 2.  Normal coronary cineangiography without evidence of significant coronary artery disease as noted above. 4.  Normal LV Function, LVEF 60%  4.  Normal hemodynamics. 5.  No significant valvular heart disease. EKG:   Sinus rhythm with 1st degree AV block, rate 77.    Incomplete left bundle branch block   Left ventricular hypertrophy with repolarization abnormality   Abnormal ECG       Telemetry:  V paced with Atrial lead dysfunction noted.             Katiuska Hylton MD ,[facility]

## 2022-01-13 NOTE — PROGRESS NOTES
Subsequent Care Progress Note        Patient:  [facility]  Unit/Bed:  F473/G771-85  YOB: 1940  MRN:   [id number]  Admit Date:   1/8/2022  Hospital Day:  5    Managing Cardiologist:  Dr. Raine Fierro     Interval Changes to HPI:     Subjective Complaint:   Nauseated. Telemetry NSR/ v paced rhythm. Physical Examination:     BP (!) 146/59   Pulse 72   Temp 98.6 °F (37 °C)   Resp 18   Ht 5' 5\" (1.651 m)   Wt 206 lb 9.1 oz (93.7 kg)   SpO2 96%   BMI 34.38 kg/m²       Intake/Output Summary (Last 24 hours) at 1/13/2022 1854  Last data filed at 1/13/2022 3627  Gross per 24 hour   Intake 1165 ml   Output 950 ml   Net 215 ml            [facility] examined at bedside in alert and oriented times 3. Focused exam reveals:     Cardiac: Heart regular rate and rhythm     Lungs:  clear to auscultation bilaterally- no wheezes, rales or rhonchi, normal air movement, no respiratory distress    Extremities:   negative    Telemetry:    normal sinus       Current Medications:   I have reviewed the patient's medications; see Medication Reconciliation. LABS:   CBC:   Recent Labs     01/11/22  0515 01/12/22  0457 01/13/22  0510   WBC 12.9* 10.4 9.4   HGB 11.9* 10.9* 10.9*    261 240      BMP:    Recent Labs     01/11/22  0515 01/13/22  0510   * 134*   K 3.8 3.6   CL 98 99   CO2 24 25   BUN 14 11   CREATININE 0.36* 0.34*   GLUCOSE 121* 99              Troponin: No results for input(s): TROPONINT in the last 72 hours. BNP: No results for input(s): PROBNP in the last 72 hours. INR: No results for input(s): INR in the last 72 hours. Mg:   Recent Labs     01/13/22  0510   MG 1.8       Cardiac Testing:    none    CLINICAL IMPRESSION:  1. COVID associated pneumonia. 2.  Significant pauses seen on telemetry associated with syncope - sinus  node dysfunction. S/p dual chamber pacemaker, now with right atrial lead dislodgement  3.   History of hypertension. 4.  History of hyperlipidemia. 5.  Normal coronary artery with cardiac catheterization in 2018. 6.  Normal left ventricular function with echocardiogram as described  above.   7.  History of bradycardia with junctional rhythm by Holter monitors in  the past.     PLAN AND RECOMMENDATIONS:  Chest X Ray reviewed  Continue with telemetry  For right atrial lead repositioning in AM Friday  Vancomycin on call EP lab  NPO after midnight    Kennie Opitz, MD      Electronically signed by Kennie Opitz, MD on 1/13/2022 at 6:54 PM

## 2022-01-13 NOTE — PROGRESS NOTES
approach pacer lead with its tip overlying the cardiac apex.       A poor inspiratory volume is present with mild left basilar infiltrate/atelectasis. Bronchopneumonia should be excluded clinically.       There is no sizable pleural effusion, gross vascular congestion, pneumothorax, or displaced fractures identified.                   Impression       INFERIOR VENA CAVA APPROACH PACER LEADS IN EXPECTED POSITION.       POOR INSPIRATION WITH MILD LEFT BASILAR INFILTRATE/ATELECTASIS.       BRONCHOPNEUMONIA SHOULD BE EXCLUDED CLINICALLY.              ASSESSMENT:  PLAN:      COVID-19 infection  Hypercoagulable    Given the patient is on low amount of oxygen given negative chest x-ray minimal symptoms will not start dexamethasone at this time      Leukocytosis improving  No evidence of active bacterial infection

## 2022-01-13 NOTE — DISCHARGE INSTR - COC
Continuity of Care Form    Patient Name: Satinder Perkins   :  1940  MRN:  [id number]    Admit date:  2022  Discharge date:  ***    Code Status Order: Full Code   Advance Directives:      Admitting Physician:  Talia ePck MD  PCP: Reanna Caal MD    Discharging Nurse: [facility] Unit/Room#: R253/G654-37  Discharging Unit Phone Number: ***    Emergency Contact:   Extended Emergency Contact Information  Primary Emergency Contact: Narciso Serrano  Address: [address] [facility], [address] [address] Phone: [phone]  Mobile Phone: [phone]  Relation: Child  Secondary Emergency Contact: Ellen 46, [address] [facility] Phone: [phone]  Mobile Phone: [phone]  Relation: Child    Past Surgical History:  Past Surgical History:   Procedure Laterality Date    CATARACT REMOVAL      CHOLECYSTECTOMY      CHOLECYSTECTOMY      EYE SURGERY Bilateral     cataract    HYSTERECTOMY      IR NEUROSTIMULATOR PLACEMENT  2021    NERVE SURGERY N/A 2021    STAGE 1 & STAGE 2 INTERSTIM performed by Federico Carreon MD at [facility]       Immunization History:   Immunization History   Administered Date(s) Administered    COVID-19, Erling Nipper, Primary or Immunocompromised, PF, 100mcg/0.5mL 2021, 2021       Active Problems:  Patient Active Problem List   Diagnosis Code    Immune thrombocytopenic purpura ([facility] [address] D69.3    Thrombocytopenia, unspecified (HCC) D69.6    Essential (primary) hypertension I10    Urge incontinence of urine N39.41    Fecal smearing R15.1    Other specified soft tissue disorders M79.89    Abdominal aortic aneurysm, without rupture (HCC) I71.4    Abnormal auditory perception of both ears H93.293    Age-related macular degeneration, dry, both eyes H35.3130    Benign neoplasm of colon D12.6    Cellulitis L03.90    Decreased ROM of neck R29.898    Diarrhea R19.7    Orthostatic dizziness R42    Edema of both legs R60.0    Esophageal reflux K21.9    Generalized osteoarthrosis, unspecified site M15.9    Hypothyroid E03.9    Imbalance R26.89    Injury of tendon of rotator cuff S46.009A    Mixed hyperlipidemia E78.2    Myogenic ptosis of eyelid of both eyes H02.423    Nausea alone R11.0    Open angle with borderline findings, low risk, bilateral H40.013    Overweight and obesity E66.3, E66.9    Ptosis of both eyelids H02.403    PVD (posterior vitreous detachment), both eyes H43.813    Sensorineural hearing loss of both ears H90.3    Tinnitus, bilateral H93.13    Vitreous degeneration H43.819    Dehydration E86.0    COVID-19 virus infection U07.1    Sick sinus syndrome (HCC) I49.5    Symptomatic bradycardia R00.1    Syncope and collapse R55       Isolation/Infection:   Isolation            Droplet Plus          Patient Infection Status       Infection Onset Added Last Indicated Last Indicated By Review Planned Expiration Resolved Resolved By    COVID-19 01/08/22 01/08/22 01/08/22 COVID-19, Rapid 01/15/22 01/22/22      Resolved    COVID-19 (Rule Out) 01/08/22 01/08/22 01/08/22 COVID-19, Rapid (Ordered)   01/08/22 Rule-Out Test Resulted            Nurse Assessment:  Last Vital Signs: BP (!) 142/54   Pulse 74   Temp 97.7 °F (36.5 °C) (Oral)   Resp 18   Ht 5' 5\" (1.651 m)   Wt 206 lb 9.1 oz (93.7 kg)   SpO2 95%   BMI 34.38 kg/m²     Last documented pain score (0-10 scale): Pain Level: 8  Last Weight:   Wt Readings from Last 1 Encounters:   01/12/22 206 lb 9.1 oz (93.7 kg)     Mental Status:  {IP PT MENTAL STATUS:73336}    IV Access:  {[facility] IV ACCESS:168980123}    Nursing Mobility/ADLs:  Walking   {CHP DME OCXJ:951693474}  Transfer  {CHP DME MOXJ:429800971}  Bathing  {CHP DME GPGL:163141637}  Dressing  {CHP DME TODF:909060531}  Toileting  {CHP DME VIYR:490013786}  Feeding  {CHP DME BLLV:553699621}  Med Admin  {CHP DME YBMF:967235016}  Med Delivery   {[facility] MED Delivery:575862044}    Wound Care Documentation and Therapy: Elimination:  Continence: Bowel: {YES / TT:41318}  Bladder: {YES / KJ:95253}  Urinary Catheter: {Urinary Catheter:102436556}   Colostomy/Ileostomy/Ileal Conduit: {YES / ZT:33533}       Date of Last BM: ***    Intake/Output Summary (Last 24 hours) at 2022 1231  Last data filed at 2022 5275  Gross per 24 hour   Intake 1165 ml   Output 950 ml   Net 215 ml     I/O last 3 completed shifts: In: 1135 [P.O.:150;  I.V.:]  Out: 1151 [Urine:1150; Emesis/NG output:1]    Safety Concerns:     [employer] Safety Concerns:328715123}    Impairments/Disabilities:      [employer] Impairments/Disabilities:907660888}    Nutrition Therapy:  Current Nutrition Therapy:   [employer] Diet List:350421713}    Routes of Feeding: {CHP DME Other Feedings:059772224}  Liquids: {Slp liquid thickness:21936}  Daily Fluid Restriction: {CHP DME Yes amt example:319903223}  Last Modified Barium Swallow with Video (Video Swallowing Test): {Done Not Done MBRB:272804604}    Treatments at the Time of Hospital Discharge:   Respiratory Treatments: ***  Oxygen Therapy:  {Therapy; copd oxygen:17558}  Ventilator:    {MH CC Vent CPZR:751450807}    Rehab Therapies: {THERAPEUTIC INTERVENTION:2870127204}  Weight Bearing Status/Restrictions: [employer]  Weight Bearin}  Other Medical Equipment (for information only, NOT a DME order):  {EQUIPMENT:420478184}  Other Treatments: ***    Patient's personal belongings (please select all that are sent with patient):  {CHP DME Belongings:836338820}    RN SIGNATURE:  {Esignature:803650077}    CASE MANAGEMENT/SOCIAL WORK SECTION    Inpatient Status Date: ***    Readmission Risk Assessment Score:  Readmission Risk              Risk of Unplanned Readmission:  10           Discharging to Facility/ Agency   Name:  [facility]  Address:  Phone: [phone]  Fax:        / signature: Electronically signed by Latonya Flaherty RN on 22 at 5:25 PM EST    PHYSICIAN SECTION    Prognosis: Guarded, improved    Condition at Discharge: stable, improved    Rehab Potential (if transferring to Rehab): good    Recommended Labs or Other Treatments After Discharge: wound care, left pacemaker site. Physician Certification: I certify the above information and transfer of Dick Domingo  is necessary for the continuing treatment of the diagnosis listed and that she requires Acute Rehab for less 30 days. Update Admission H&P: No change in H&P, Patient positive for COVID and post permanent pacemaker this admission.       PHYSICIAN SIGNATURE:  Electronically signed by Kyung Sanchez MD on 1/13/22 at 12:36 PM EST

## 2022-01-13 NOTE — PROGRESS NOTES
Hospitalist Progress Note      Date of Admission: 1/8/2022  Chief Complaint:    Chief Complaint   Patient presents with    Dizziness    Emesis     Subjective:  No complaints. Talking on the phone      Medications:    Infusion Medications    sodium chloride      sodium chloride      sodium chloride       Scheduled Medications    enoxaparin  30 mg SubCUTAneous BID    metoprolol succinate  25 mg Oral Daily    senna  1 tablet Oral Nightly    sodium chloride flush  5-40 mL IntraVENous 2 times per day    sodium chloride flush  5-40 mL IntraVENous 2 times per day    losartan  100 mg Oral Daily    amLODIPine  5 mg Oral Daily    pantoprazole  40 mg Oral QAM AC    levothyroxine  112 mcg Oral Daily    sodium chloride flush  5-40 mL IntraVENous 2 times per day     PRN Meds: sodium chloride flush, sodium chloride, sodium chloride flush, sodium chloride, traMADol **OR** traMADol, ondansetron, acetaminophen, hydrALAZINE, sodium chloride flush, sodium chloride, ondansetron, morphine **OR** morphine    Intake/Output Summary (Last 24 hours) at 1/13/2022 1010  Last data filed at 1/13/2022 8844  Gross per 24 hour   Intake 1165 ml   Output 951 ml   Net 214 ml     Exam:  BP (!) 142/54   Pulse 67   Temp 97.7 °F (36.5 °C) (Oral)   Resp 18   Ht 5' 5\" (1.651 m)   Wt 206 lb 9.1 oz (93.7 kg)   SpO2 95%   BMI 34.38 kg/m²   Head: Normocephalic, atraumatic  Sclera clear  Neck JVD flat  Lungs: normal effort of breathing    Labs:   Recent Labs     01/11/22  0515 01/12/22  0457 01/13/22  0510   WBC 12.9* 10.4 9.4   HGB 11.9* 10.9* 10.9*   HCT 36.7* 33.7* 33.6*    261 240     Recent Labs     01/11/22  0515 01/13/22  0510   * 134*   K 3.8 3.6   CL 98 99   CO2 24 25   BUN 14 11   CREATININE 0.36* 0.34*   CALCIUM 8.8 8.6   AST 16 20   ALT 15 31   BILITOT 0.5 0.4   ALKPHOS 82 105     No results for input(s): INR in the last 72 hours. No results for input(s): Darlene Ends in the last 72 hours.   Radiology:  CT CHEST WO CONTRAST   Final Result    There is atelectasis in the posterior dependent portions of the lung bases and there is trace pleural fluid. There is no infiltrate or consolidation. XR CHEST (2 VW)   Final Result   No radiographic evidence of acute intrathoracic process. XR CHEST PORTABLE   Final Result   NO ACUTE CARDIOPULMONARY DISEASE      XR FOOT RIGHT (MIN 3 VIEWS)   Final Result      ACUTE FRACTURES OF THE SECOND THROUGH FOURTH (AND PROBABLY FIFTH) METATARSALS, AS NOTED. XR CHEST PORTABLE   Final Result      INFERIOR VENA CAVA APPROACH PACER LEADS IN EXPECTED POSITION. POOR INSPIRATION WITH MILD LEFT BASILAR INFILTRATE/ATELECTASIS. BRONCHOPNEUMONIA SHOULD BE EXCLUDED CLINICALLY. XR ANKLE RIGHT (MIN 3 VIEWS)   Final Result      INCOMPLETELY VISUALIZED METATARSAL FRACTURES, AS NOTED. DEDICATED RIGHT FOOT RADIOGRAPHS ARE SUGGESTED. NONDISPLACED MEDIAL MALLEOLAR FRACTURE. The findings were discussed with the patient's ICU nurse at approximately 7:28 AM on 1/9/2022. CT HEAD WO CONTRAST   Final Result      NO ACUTE INTRACRANIAL PROCESS OR SIGNIFICANT CHANGE FROM PRIOR STUDIES IDENTIFIED. XR CHEST PORTABLE   Final Result   NO ACUTE CARDIOPULMONARY DISEASE. Assessment/Plan:    SSS with sinus pause: S/p dual-chamber pacemaker on 1/11  -stop IVF    Covid positive without hypoxia. Vaccinated but not boosted. Does not require further tx at this time. Recommend isolation through 1/18. R ankle and metatarsal fx: management per Ortho    Mobilize with PT/OT.  D/c planning to SNF    25 minutes total care time, >1/2 in unit/floor time and care coordination     Tha Rubio DO

## 2022-01-13 NOTE — PLAN OF CARE
See OT evaluation for all goals and OT POC.  Electronically signed by VANESA Hyde/L on 1/13/2022 at 11:20 AM

## 2022-01-13 NOTE — CARE COORDINATION
SPOKE TO KIM, PTS DTR, PT HAS BEEN FORGETFUL AND ALSO [place]. DISCUSSED DC PLAN Dudley Fontenot AGREES TO SNF IF OK WITH PT. #1 [facility], #2 [facility], #3 [facility]. DTR AWARE SNF WILL BE BASED ON COVID/ACCEPTANCE. DTR TO DISCUSS WITH PT.  DTR WOULD WANT [facility] SET UP UPON DC FROM SNF. ATTEMPTED TO CALL PT IN ROOM, NO ANSWER. DISCUSSED WITH PTS RN TO PLEASE TALK WITH PT ABOUT DC PLAN.      1400  REFERRAL FAXED TO [facility], [address] NOTIFIED. MESSAGE LEFT WITH DONG SPEARS.     1555  NO BEDS AVAILABLE AT THIS TIME FOR [facility]. SPOKE TO DONG SPEARS SNF IS TAKING + COVID PTS CASE BY CASE, REFERRAL GIVEN, TO START PRECERT IF ACCEPTED. 1640  PER TORY AT [facility] THEY CAN ACCEPT PT AND PRECERT WILL BE STARTED.

## 2022-01-13 NOTE — FLOWSHEET NOTE
Patient was helped repositioned in bed per her level of comfort. crackles and jello was provided per her requested. 20:07 patient was medicated with morphine 2 mg IV push slowly for right leg pain rated 6/10.she voiced no numbness or tingling  But she expressed that her left calf is cramping,elevating her legs on a pillow provided some minute relief from her discomfort. 22:50 patient complain that her right arm iv is leaking,so a new iv site was located into her right accessory vein. inserted was a no. 22 gauge  And she tolerated the iv insertion well. IV fluid resumed. 23:49 patient complain of pain in her pacemaker insertion site,her arm was supported with a pillow,she was repositioned and Eliz Khan is in to assist,morphine 4 mg IVP was given for pain 9/10  .  00:00 patient is resting quietly in bed,her breathing is effortless. 0315 morphine 4 mg for patient pain 8/10,her vital signs noted.

## 2022-01-13 NOTE — PROGRESS NOTES
INPATIENT PROGRESS NOTES    PATIENT NAME: Adarsh Keating  MRN: [id number]  SERVICE DATE:  January 13, 2022   SERVICE TIME:  7:42 AM      PRIMARY SERVICE: Pulmonary Disease    CHIEF COMPLAINTS: COVID-19 infection    INTERVAL HPI: Patient seen and examined at bedside, Interval Notes, orders reviewed. Nursing notes noted    Patient reports nausea, denies chest pain, no difficulty breathing, she is on 2 L O2 saturation 95 to 97%, no fever, no chest pain, no coughing, no worsening lower extremity edema, no abdominal pain. She has not lost did not. Review of system:     GI Abdominal pain No  Skin Rash No    Social History     Tobacco Use    Smoking status: Never Smoker    Smokeless tobacco: Never Used   Substance Use Topics    Alcohol use: Yes     Comment: occasionally     History reviewed. No pertinent family history. OBJECTIVE    Body mass index is 34.38 kg/m². PHYSICAL EXAM:  Vitals:  BP (!) 151/72   Pulse 66   Temp 97.9 °F (36.6 °C)   Resp 18   Ht 5' 5\" (1.651 m)   Wt 206 lb 9.1 oz (93.7 kg)   SpO2 97%   BMI 34.38 kg/m²     General: alert, cooperative, no distress  Head: normocephalic, atraumatic  Eyes:No gross abnormalities. ENT:  MMM no lesions  Neck:  supple and no masses  Chest :  productive movement, no wheezing, no rales, nontender, tympanic  Heart[de-identified] Heart sounds are normal.  Regular rate and rhythm without murmur, gallop or rub. ABD:  symmetric, soft, nontender, no guarding or rebound  Musculoskeletal : no cyanosis, no clubbing and no edema  Neuro:  Grossly normal  Skin: No rashes or nodules noted.   Lymph node:  no cervical nodes  Urology: No [place]   Psychiatric: appropriate    DATA:   Recent Labs     01/12/22  0457 01/13/22  0510   WBC 10.4 9.4   HGB 10.9* 10.9*   HCT 33.7* 33.6*   MCV 81.7* 81.9*    240     Recent Labs     01/11/22  0515 01/11/22  0515 01/13/22  0510   *  --  134*   K 3.8  --  3.6   CL 98  --  99   CO2 24  --  25   BUN 14  --  11   CREATININE 0.36*  -- 0.34*   GLUCOSE 121*  --  99   CALCIUM 8.8  --  8.6   PROT 5.7*  --  5.4*   LABALBU 2.9*  --  2.5*   BILITOT 0.5  --  0.4   ALKPHOS 82  --  105   AST 16  --  20   ALT 15   < > 31   LABGLOM >60.0   < > >60.0   GFRAA >60.0   < > >60.0   GLOB 2.8   < > 2.9    < > = values in this interval not displayed. MV Settings:          No results for input(s): PHART, KTG6DSI, PO2ART, PHU9XXF, BEART, M3JPSGCV in the last 72 hours. O2 Device: Nasal cannula  O2 Flow Rate (L/min): 2 L/min    ADULT ORAL NUTRITION SUPPLEMENT; Breakfast, Dinner; Standard High Calorie/High Protein Oral Supplement  ADULT ORAL NUTRITION SUPPLEMENT; Lunch; Frozen Oral Supplement  ADULT DIET; Regular     MEDICATIONS during current hospitalization:    Continuous Infusions:   sodium chloride 75 mL/hr at 01/12/22 2006    sodium chloride      sodium chloride      sodium chloride         Scheduled Meds:   metoprolol succinate  25 mg Oral Daily    polyethylene glycol  17 g Oral Daily    senna  1 tablet Oral Nightly    sodium chloride flush  5-40 mL IntraVENous 2 times per day    sodium chloride flush  5-40 mL IntraVENous 2 times per day    losartan  100 mg Oral Daily    amLODIPine  5 mg Oral Daily    pantoprazole  40 mg Oral QAM AC    levothyroxine  112 mcg Oral Daily    sodium chloride flush  5-40 mL IntraVENous 2 times per day       PRN Meds:sodium chloride flush, sodium chloride, sodium chloride flush, sodium chloride, traMADol **OR** traMADol, ondansetron, acetaminophen, hydrALAZINE, sodium chloride flush, sodium chloride, ondansetron, morphine **OR** morphine    Radiology  XR ANKLE RIGHT (MIN 3 VIEWS)    Result Date: 1/9/2022  XR ANKLE RIGHT (MIN 3 VIEWS): 1/8/2022 CLINICAL HISTORY: Pain after fall. COMPARISON: None available. TECHNIQUE: Portable AP, lateral and oblique radiographs of the left ankle were obtained.  FINDINGS: Best visualized on the AP view, fractures of the second metatarsal and probably third, fourth and fifth metatarsals are noted. Dedicated radiographs of the right foot are suggested. A nondisplaced fracture of the medial malleolus is present. There is no other fracture, dislocation, radiodense foreign bodies, pathologic calcifications, or worrisome bone destruction identified. Mild soft tissue swelling and degenerative changes of the ankle are noted. INCOMPLETELY VISUALIZED METATARSAL FRACTURES, AS NOTED. DEDICATED RIGHT FOOT RADIOGRAPHS ARE SUGGESTED. NONDISPLACED MEDIAL MALLEOLAR FRACTURE. The findings were discussed with the patient's ICU nurse at approximately 7:28 AM on 1/9/2022. XR FOOT RIGHT (MIN 3 VIEWS)    Result Date: 1/9/2022  XR FOOT RIGHT (MIN 3 VIEWS) : 1/9/2022 CLINICAL HISTORY:  fx metatarsal . COMPARISON: Right ankle radiographs 1/8/2022. TECHNIQUE: AP, lateral, and oblique radiographs of the right foot were obtained. FINDINGS: A minimally comminuted fracture of the proximal third of the right third metatarsal is displaced approximately 5 to 6 mm with nearly 1 cm of overriding and foreshortening. An oblique fracture of the distal third of the right second metatarsal is present, with approximately 3 to 4 mm of lateral displacement. There is a nondisplaced fracture of the base of the right fourth metatarsal (and probably fifth metatarsal). A nondisplaced medial malleolar fracture appears unchanged. ACUTE FRACTURES OF THE SECOND THROUGH FOURTH (AND PROBABLY FIFTH) METATARSALS, AS NOTED. CT HEAD WO CONTRAST    Result Date: 1/8/2022  CT HEAD WO CONTRAST : 1/8/2022 CLINICAL HISTORY: Dizziness. COMPARISON: Head MRI 1/30/2012 and head CT 12/16/2011. TECHNIQUE: Spiral unenhanced images were obtained of the head, with routine multiplanar reconstructions performed. All CT scans at this facility use dose modulation, iterative reconstruction, and/or weight based dosing when appropriate to reduce radiation dose to as low as reasonably achievable.  FINDINGS: There is no intracranial hemorrhage, mass effect, midline shift, extra-axial collection, evidence of hydrocephalus, recent ischemic infarct, or skull fracture identified. Mild to moderate generalized cerebral volume loss and mild white matter changes have mildly progressed from the prior studies. The mastoid air cells and visualized paranasal sinuses are essentially clear. NO ACUTE INTRACRANIAL PROCESS OR SIGNIFICANT CHANGE FROM PRIOR STUDIES IDENTIFIED. XR CHEST PORTABLE    Result Date: 1/12/2022  EXAMINATION: XR CHEST PORTABLE CLINICAL HISTORY: PACEMAKER PLACEMENT COMPARISONS: JANUARY 9, 2022 FINDINGS: Pacemaker generator and wires unchanged. Osseous structures intact. Cardiopericardial silhouette normal. Lungs clear. NO ACUTE CARDIOPULMONARY DISEASE    XR CHEST PORTABLE    Result Date: 1/9/2022  XR CHEST PORTABLE : 1/9/2022 CLINICAL HISTORY:  Pacemaker placement and rule out pneumothorax . COMPARISON: 1/8/2022. TECHNIQUE: A portable upright AP radiograph of the chest was obtained. FINDINGS: The heart is mildly enlarged with an inferior vena cava approach pacer lead with its tip overlying the cardiac apex. A poor inspiratory volume is present with mild left basilar infiltrate/atelectasis. Bronchopneumonia should be excluded clinically. There is no sizable pleural effusion, gross vascular congestion, pneumothorax, or displaced fractures identified. INFERIOR VENA CAVA APPROACH PACER LEADS IN EXPECTED POSITION. POOR INSPIRATION WITH MILD LEFT BASILAR INFILTRATE/ATELECTASIS. BRONCHOPNEUMONIA SHOULD BE EXCLUDED CLINICALLY. XR CHEST PORTABLE    Result Date: 1/8/2022  EXAMINATION: XR CHEST PORTABLE CLINICAL HISTORY: WEAKNESS COMPARISONS: DECEMBER 15, 2011 FINDINGS: Osseous structures intact. Cardiopericardial silhouette normal. Pulmonary vasculature normal. Lungs clear. NO ACUTE CARDIOPULMONARY DISEASE.        CT chest shows bilateral atelectasis, and small bilateral effusion, no evidence of       IMPRESSION AND SUGGESTION:  Patient is at risk due to   · COVID-19 infection  · Hypercoagulable state secondary to COVID-19 infection  · Sinus pauses status post pacemaker  · Probable volume overload      Recommendation  · O2 to keep sat 90 to 92%  · Repeat D-dimer  · Start DVT prophylaxis  · Lasix 20 mg IV x1 and monitor renal function and electrolyte  · Incentive spirometry and flutter device  · Maintain euvolemic        I spent 30 min with this patient, greater the 50% of this time was spent in counseling and/or coordinating of care.       Lorin Diaz MD  1/13/2022  7:50 AM

## 2022-01-13 NOTE — PROGRESS NOTES
[facility]CC[facility] EVALUATION - ACUTE     NAME: Dick Domingo  : 1940 (78 y.o.)  MRN: [id number]  CODE STATUS: Full Code  [facility]    Date of Service: 2022    Patient Diagnosis(es): Dehydration [E86.0]  Sick sinus syndrome ([address] [I49.5]  Symptomatic bradycardia [R00.1]  Closed fracture of right ankle, initial encounter [S82.891A]  COVID-19 virus infection [U07.1]  Syncope and collapse [R55]   Chief Complaint   Patient presents with    Dizziness    Emesis     Patient Active Problem List    Diagnosis Date Noted    Dehydration     COVID-19 virus infection     Sick sinus syndrome ([address]     Symptomatic bradycardia     Syncope and collapse     Cellulitis 2021    Edema of both legs 2021    Urge incontinence of urine     Fecal smearing     Abdominal aortic aneurysm, without rupture ([address] 2021    Decreased ROM of neck 2020    Orthostatic dizziness 2019    Imbalance 2019    Immune thrombocytopenic purpura ([address] 2019    Thrombocytopenia, unspecified ([address] 2019    Essential (primary) hypertension 2019    Diarrhea 2018    Abnormal auditory perception of both ears 2018    Tinnitus, bilateral 2018    Other specified soft tissue disorders 09/15/2017    PVD (posterior vitreous detachment), both eyes 2017    Ptosis of both eyelids 2017    Injury of tendon of rotator cuff 2017    Age-related macular degeneration, dry, both eyes 2016    Myogenic ptosis of eyelid of both eyes 2016    Open angle with borderline findings, low risk, bilateral 2016    Vitreous degeneration 2015    Nausea alone 2015    Sensorineural hearing loss of both ears 2013    Hypothyroid 2013    Mixed hyperlipidemia 2013    Benign neoplasm of colon 10/30/2003    Esophageal reflux 10/22/2002    Generalized osteoarthrosis, unspecified site 10/22/2002    Overweight and obesity 10/22/2002        Past Medical History:   Diagnosis Date    Abdominal aortic aneurysm without rupture (HCC)     Age-related macular degeneration     Dehydration     Esophageal reflux     Falls frequently 01/02/2022    Hearing loss     High blood pressure     HIGH CHOLESTEROL     Hyperlipidemia     Hypothyroidism     Imbalance     Incontinence     Nausea     Obesity     Orthostatic dizziness     Osteoarthrosis     Overactive bladder     Thyroid disease      Past Surgical History:   Procedure Laterality Date    CATARACT REMOVAL      CHOLECYSTECTOMY      CHOLECYSTECTOMY      EYE SURGERY Bilateral     cataract    HYSTERECTOMY      IR NEUROSTIMULATOR PLACEMENT  07/28/2021    NERVE SURGERY N/A 7/28/2021    STAGE 1 & STAGE 2 INTERSTIM performed by Jessica Mann MD at [facility]      Restrictions  Restrictions/Precautions: Weight Bearing,Fall Risk  Lower Extremity Weight Bearing Restrictions  Right Lower Extremity Weight Bearing: Weight Bearing As Tolerated (in walking boot)  Position Activity Restriction  Other position/activity restrictions: New pacemaker placed 1/11/22     Safety Devices: Safety Devices  Safety Devices in place: Yes  Type of devices:  All fall risk precautions in place        Subjective  Pre Treatment Pain Screening  Pain at present: 8  Scale Used: Numeric Score  Intervention List: Patient able to continue with treatment,Patient declined any intervention    Pain Reassessment:   Pain Assessment  Patient Currently in Pain: Yes  Pain Assessment: 0-10  Pain Level: 8  Pain Type: Acute pain  Pain Location: Ankle,Foot  Pain Orientation: Right  Pain Descriptors: Aching,Discomfort     Prior Level of Function:  Social/Functional History  Lives With: Alone  Type of Home: House  Home Layout: One level  Home Access: Stairs to enter with rails  Entrance Stairs - Number of Steps: 3  Bathroom Shower/Tub: Tub/Shower unit,Walk-in shower  Bathroom Equipment:  (Can borrow a shower chair)  Home Equipment: Rolling walker,Cane  ADL Assistance: Independent  Homemaking Assistance: Independent  Ambulation Assistance: Independent ([facility] usually, however recently used [address])  Transfer Assistance: Independent  Active : Yes    OBJECTIVE:     Orientation Status:  Orientation  Overall Orientation Status: Within Functional Limits    Observation:  Observation/Palpation  Posture: Good  Observation: Pt alert and attentive, anxious. Sling present for comfort and reminder of PPM precautions; educated on PPM precautions and adjusted for fit but removed for mobility    Cognition Status:  Cognition  Overall Cognitive Status: Exceptions  Arousal/Alertness: Delayed responses to stimuli  Following Commands:  Follows one step commands with repetition  Attention Span: Difficulty attending to directions,Difficulty dividing attention  Memory: Decreased recall of precautions,Decreased recall of recent events,Decreased short term memory,Decreased long term memory  Safety Judgement: Decreased awareness of need for assistance,Decreased awareness of need for safety  Problem Solving: Assistance required to generate solutions,Assistance required to identify errors made,Assistance required to implement solutions,Assistance required to correct errors made  Insights: Decreased awareness of deficits  Initiation: Requires cues for some  Sequencing: Requires cues for some  Cognition Comment: Verbal cues for sequencing and safety    Perception Status:  Perception  Overall Perceptual Status: WFL    Sensation Status:  Sensation  Overall Sensation Status: [facility]    Vision and Hearing Status:  Vision  Vision: Impaired  Vision Exceptions: Wears glasses at all times  Hearing  Hearing: Exceptions to [facility]  Hearing Exceptions: Hard of hearing/hearing concerns,Bilateral hearing aid     ROM:   LUE AROM (degrees)  LUE General AROM: Within pacemaker precautions  Left Hand AROM (degrees)  Left Hand AROM: WFL  RUE AROM (degrees)  RUE AROM : WFL  Right Hand AROM (degrees)  Right Hand AROM: WFL    Strength:  LUE Strength  Gross LUE Strength: Exceptions to [facility]  L Hand General: 3+/5  LUE Strength Comment: Grossly decreased, not fully assessed d/t pacemaker precautions  RUE Strength  Gross RUE Strength: Exceptions to [facility]  R Hand General: 3+/5  RUE Strength Comment: 3+/5 all planes    Coordination, Tone, Quality of Movement: Tone RUE  RUE Tone: Normotonic  Tone LUE  LUE Tone: Normotonic  Coordination  Movements Are Fluid And Coordinated: No  Coordination and Movement description: Fine motor impairments,Decreased accuracy,Right UE,Left UE  Quality of Movement Other  Comment: Arthritic changes    Hand Dominance:  Hand Dominance  Hand Dominance: Left    ADL Status:  ADL  Feeding: Setup  Grooming: Supervision  UE Bathing: Stand by assistance  LE Bathing: Dependent/Total  UE Dressing: Stand by assistance  LE Dressing: Dependent/Total  Toileting: Dependent/Total  Additional Comments: Pt simulated ADLs as above. Limited d/t anxiety and requires frequent cues and redirection. Requires BUE support for standing.  Decreased safety awareness and sequencing noted  Toilet Transfers  Toilet Transfer: Unable to assess  Toilet Transfers Comments: Anticipate Max +2 based on other mobility     Therapy key for assistance levels    Independent = Pt. is able to perform task with no assistance but may require a device   Stand by assistance = Pt. does not perform task at an independent level but does not need physical assistance, requires verbal cues  Minimal, Moderate, Maximal Assistance = Pt. requires physical assistance (25%, 50%, 75% assist from helper) for task but is able to actively participate in task   Dependent = Pt. requires total assistance with task and is not able to actively participate with task completion     Functional Mobility:  Functional Mobility  Functional Mobility Comments: Unable to take any steps  Transfers  Stand Pivot Transfers: Maximum assistance  Sit to stand: Maximum assistance,2 Person assistance  Stand to sit: Minimal assistance    Bed Mobility  Bed mobility  Supine to Sit: Moderate assistance,Maximum assistance  Comment: [place] assist to complete all aspects of transition, requiring assist to initiate mobility with BLEs. Increased time to complete. Cues for maintaining PPM precautions throughout    Seated and Standing Balance:  Balance  Sitting Balance: Supervision  Standing Balance: Minimal assistance    Functional Endurance:  Activity Tolerance  Activity Tolerance: Patient limited by fatigue,Patient limited by pain,Treatment limited secondary to decreased cognition    D/C Recommendations:  OT D/C RECOMMENDATIONS  REQUIRES OT FOLLOW UP: Yes    Equipment Recommendations:  OT Equipment Recommendations  Other: Continue to assess    OT Education:   OT Education  OT Education: [facility]  Patient Education: Educated pt. on role of acute care OT  Barriers to Learning: None    OT Follow Up:  OT D/C RECOMMENDATIONS  REQUIRES OT FOLLOW UP: Yes     Assessment/Discharge Disposition:  Assessment: Pt is an 78year old woman from home with family support who presents to [address] [place] [address] with the above deficits which impact her ability to perform ADLs and IADLs. Pt. limited d/t fatigue.  Pt. would benefit from continued OT to maximize safety and independence with transfers and mobility, including bathing tasks  Performance deficits / Impairments: Decreased functional mobility ,Decreased ADL status,Decreased strength,Decreased endurance,Decreased balance,Decreased high-level IADLs,Decreased fine motor control,Decreased coordination  Prognosis: Good  Discharge Recommendations: Continue to assess pending progress  Decision Making: Medium Complexity  History: Pt's medical history is moderately complex  Exam: Pt. has 8 performance deficits  Assistance / Modification: Pt. requires max A    Six Click Score   How much help for putting on and taking off regular lower body clothing?: Total  How much help for Bathing?: A Lot  How much help for Toileting?: Total  How much help for putting on and taking off regular upper body clothing?: A Little  How much help for taking care of personal grooming?: A Little  How much help for eating meals?: A Little  AM-PAC Inpatient Daily Activity Raw Score: 13  AM-PAC Inpatient ADL T-Scale Score : 32.03  ADL Inpatient CMS 0-100% Score: 63.03    Plan:  Plan  Times per week: 1-4x  Plan weeks: Length of acute stay  Current Treatment Recommendations: Strengthening,Balance Training,Functional Mobility Training,Endurance Training,Pain Management,Safety Education & Training,Patient/Caregiver Education & Training,Equipment Evaluation, Education, & procurement,Home Management Training,Cognitive/Perceptual Training,Self-Care / ADL    Goals:   Patient will:    - Improve functional endurance to tolerate/complete 30 mins of ADL's  - Be Min A in UB ADLs   - Be Min A in LB ADLs  - Be Min A in ADL transfers without LOB  - Be Min A in toileting tasks  - Improve R UE strength and endurance to 4-/5 in order to participate in self-care activities as projected. - Access appropriate D/C site with as few architectural barriers as possible. - Sequence self-care tasks with 25% verbal cues for sequencing    Patient Goal: Patient goals : \"I want to go home\"     Discussed and agreed upon: Yes Comments:     Therapy Time:   OT Individual Minutes  Time In: 1021  Time Out: 1053  Minutes: 32    Eval: 32 minutes     Electronically signed by:     Angelo Sultana OTR/L, OTR/L  1/13/2022, 11:15 AM

## 2022-01-14 ENCOUNTER — APPOINTMENT (OUTPATIENT)
Dept: CARDIAC CATH/INVASIVE PROCEDURES | Age: 82
DRG: 242 | End: 2022-01-14
Payer: MEDICARE

## 2022-01-14 ENCOUNTER — APPOINTMENT (OUTPATIENT)
Dept: GENERAL RADIOLOGY | Age: 82
DRG: 242 | End: 2022-01-14
Payer: MEDICARE

## 2022-01-14 LAB
ALBUMIN SERPL-MCNC: 2.5 G/DL (ref 3.5–4.6)
ALP BLD-CCNC: 91 U/L (ref 40–130)
ALT SERPL-CCNC: 24 U/L (ref 0–33)
ANION GAP SERPL CALCULATED.3IONS-SCNC: 11 MEQ/L (ref 9–15)
AST SERPL-CCNC: 15 U/L (ref 0–35)
BASOPHILS ABSOLUTE: 0.1 K/UL (ref 0–0.2)
BASOPHILS RELATIVE PERCENT: 1.1 %
BILIRUB SERPL-MCNC: 0.4 MG/DL (ref 0.2–0.7)
BUN BLDV-MCNC: 11 MG/DL (ref 8–23)
CALCIUM SERPL-MCNC: 8.9 MG/DL (ref 8.5–9.9)
CHLORIDE BLD-SCNC: 102 MEQ/L (ref 95–107)
CO2: 26 MEQ/L (ref 20–31)
CREAT SERPL-MCNC: 0.32 MG/DL (ref 0.5–0.9)
EOSINOPHILS ABSOLUTE: 0.5 K/UL (ref 0–0.7)
EOSINOPHILS RELATIVE PERCENT: 4.9 %
GFR AFRICAN AMERICAN: >60
GFR NON-AFRICAN AMERICAN: >60
GLOBULIN: 3.2 G/DL (ref 2.3–3.5)
GLUCOSE BLD-MCNC: 93 MG/DL (ref 70–99)
HCT VFR BLD CALC: 34.9 % (ref 37–47)
HEMOGLOBIN: 11.3 G/DL (ref 12–16)
LYMPHOCYTES ABSOLUTE: 1.5 K/UL (ref 1–4.8)
LYMPHOCYTES RELATIVE PERCENT: 15.1 %
MCH RBC QN AUTO: 26.6 PG (ref 27–31.3)
MCHC RBC AUTO-ENTMCNC: 32.4 % (ref 33–37)
MCV RBC AUTO: 81.9 FL (ref 82–100)
MONOCYTES ABSOLUTE: 1.2 K/UL (ref 0.2–0.8)
MONOCYTES RELATIVE PERCENT: 12.3 %
NEUTROPHILS ABSOLUTE: 6.8 K/UL (ref 1.4–6.5)
NEUTROPHILS RELATIVE PERCENT: 66.6 %
PDW BLD-RTO: 14 % (ref 11.5–14.5)
PLATELET # BLD: 271 K/UL (ref 130–400)
POTASSIUM SERPL-SCNC: 3.9 MEQ/L (ref 3.4–4.9)
RBC # BLD: 4.26 M/UL (ref 4.2–5.4)
SODIUM BLD-SCNC: 139 MEQ/L (ref 135–144)
TOTAL PROTEIN: 5.7 G/DL (ref 6.3–8)
WBC # BLD: 10.1 K/UL (ref 4.8–10.8)

## 2022-01-14 PROCEDURE — 94667 MNPJ CHEST WALL 1ST: CPT

## 2022-01-14 PROCEDURE — 6360000002 HC RX W HCPCS: Performed by: NURSE PRACTITIONER

## 2022-01-14 PROCEDURE — 2060000000 HC ICU INTERMEDIATE R&B

## 2022-01-14 PROCEDURE — 2580000003 HC RX 258: Performed by: INTERNAL MEDICINE

## 2022-01-14 PROCEDURE — 0JH606Z INSERTION OF PACEMAKER, DUAL CHAMBER INTO CHEST SUBCUTANEOUS TISSUE AND FASCIA, OPEN APPROACH: ICD-10-PCS | Performed by: INTERNAL MEDICINE

## 2022-01-14 PROCEDURE — 0JWT0PZ REVISION OF CARDIAC RHYTHM RELATED DEVICE IN TRUNK SUBCUTANEOUS TISSUE AND FASCIA, OPEN APPROACH: ICD-10-PCS | Performed by: INTERNAL MEDICINE

## 2022-01-14 PROCEDURE — 6370000000 HC RX 637 (ALT 250 FOR IP): Performed by: INTERNAL MEDICINE

## 2022-01-14 PROCEDURE — 36415 COLL VENOUS BLD VENIPUNCTURE: CPT

## 2022-01-14 PROCEDURE — 99232 SBSQ HOSP IP/OBS MODERATE 35: CPT | Performed by: INTERNAL MEDICINE

## 2022-01-14 PROCEDURE — 33215 REPOSITION PACING-DEFIB LEAD: CPT

## 2022-01-14 PROCEDURE — 92953 TEMPORARY EXTERNAL PACING: CPT

## 2022-01-14 PROCEDURE — 71046 X-RAY EXAM CHEST 2 VIEWS: CPT

## 2022-01-14 PROCEDURE — 2700000000 HC OXYGEN THERAPY PER DAY

## 2022-01-14 PROCEDURE — 0JPT0PZ REMOVAL OF CARDIAC RHYTHM RELATED DEVICE FROM TRUNK SUBCUTANEOUS TISSUE AND FASCIA, OPEN APPROACH: ICD-10-PCS | Performed by: INTERNAL MEDICINE

## 2022-01-14 PROCEDURE — 6360000002 HC RX W HCPCS

## 2022-01-14 PROCEDURE — 2580000003 HC RX 258: Performed by: NURSE PRACTITIONER

## 2022-01-14 PROCEDURE — 02HK3JZ INSERTION OF PACEMAKER LEAD INTO RIGHT VENTRICLE, PERCUTANEOUS APPROACH: ICD-10-PCS | Performed by: INTERNAL MEDICINE

## 2022-01-14 PROCEDURE — 85025 COMPLETE CBC W/AUTO DIFF WBC: CPT

## 2022-01-14 PROCEDURE — 02H63JZ INSERTION OF PACEMAKER LEAD INTO RIGHT ATRIUM, PERCUTANEOUS APPROACH: ICD-10-PCS | Performed by: INTERNAL MEDICINE

## 2022-01-14 PROCEDURE — 4B02XSZ MEASUREMENT OF CARDIAC PACEMAKER, EXTERNAL APPROACH: ICD-10-PCS | Performed by: INTERNAL MEDICINE

## 2022-01-14 PROCEDURE — 80053 COMPREHEN METABOLIC PANEL: CPT

## 2022-01-14 PROCEDURE — 2709999900 HC NON-CHARGEABLE SUPPLY

## 2022-01-14 PROCEDURE — 6360000002 HC RX W HCPCS: Performed by: INTERNAL MEDICINE

## 2022-01-14 RX ORDER — SODIUM CHLORIDE 0.9 % (FLUSH) 0.9 %
5-40 SYRINGE (ML) INJECTION PRN
Status: DISCONTINUED | OUTPATIENT
Start: 2022-01-14 | End: 2022-01-16

## 2022-01-14 RX ORDER — SODIUM CHLORIDE 9 MG/ML
INJECTION, SOLUTION INTRAVENOUS CONTINUOUS
Status: DISCONTINUED | OUTPATIENT
Start: 2022-01-14 | End: 2022-01-14

## 2022-01-14 RX ORDER — SODIUM CHLORIDE 0.9 % (FLUSH) 0.9 %
5-40 SYRINGE (ML) INJECTION EVERY 12 HOURS SCHEDULED
Status: DISCONTINUED | OUTPATIENT
Start: 2022-01-14 | End: 2022-01-16

## 2022-01-14 RX ORDER — FUROSEMIDE 10 MG/ML
20 INJECTION INTRAMUSCULAR; INTRAVENOUS ONCE
Status: COMPLETED | OUTPATIENT
Start: 2022-01-14 | End: 2022-01-14

## 2022-01-14 RX ORDER — SODIUM CHLORIDE 9 MG/ML
25 INJECTION, SOLUTION INTRAVENOUS PRN
Status: DISCONTINUED | OUTPATIENT
Start: 2022-01-14 | End: 2022-01-16 | Stop reason: HOSPADM

## 2022-01-14 RX ADMIN — FUROSEMIDE 20 MG: 10 INJECTION, SOLUTION INTRAVENOUS at 15:29

## 2022-01-14 RX ADMIN — Medication 10 ML: at 22:15

## 2022-01-14 RX ADMIN — TRAMADOL HYDROCHLORIDE 100 MG: 50 TABLET, COATED ORAL at 23:57

## 2022-01-14 RX ADMIN — ENOXAPARIN SODIUM 30 MG: 100 INJECTION SUBCUTANEOUS at 22:15

## 2022-01-14 RX ADMIN — VANCOMYCIN HYDROCHLORIDE 1000 MG: 1 INJECTION, POWDER, LYOPHILIZED, FOR SOLUTION INTRAVENOUS at 07:50

## 2022-01-14 RX ADMIN — LOSARTAN POTASSIUM 100 MG: 100 TABLET, FILM COATED ORAL at 10:04

## 2022-01-14 RX ADMIN — Medication 10 ML: at 10:20

## 2022-01-14 RX ADMIN — VANCOMYCIN HYDROCHLORIDE: 1 INJECTION, POWDER, LYOPHILIZED, FOR SOLUTION INTRAVENOUS at 08:00

## 2022-01-14 RX ADMIN — SODIUM CHLORIDE: 9 INJECTION, SOLUTION INTRAVENOUS at 05:58

## 2022-01-14 RX ADMIN — Medication 10 ML: at 10:21

## 2022-01-14 RX ADMIN — Medication 10 ML: at 10:19

## 2022-01-14 RX ADMIN — AMLODIPINE BESYLATE 5 MG: 5 TABLET ORAL at 10:04

## 2022-01-14 RX ADMIN — METOPROLOL SUCCINATE 25 MG: 25 TABLET, FILM COATED, EXTENDED RELEASE ORAL at 10:04

## 2022-01-14 ASSESSMENT — ENCOUNTER SYMPTOMS
RESPIRATORY NEGATIVE: 1
GASTROINTESTINAL NEGATIVE: 1

## 2022-01-14 ASSESSMENT — PAIN SCALES - GENERAL: PAINLEVEL_OUTOF10: 9

## 2022-01-14 NOTE — PROGRESS NOTES
No results for input(s): INR in the last 72 hours. No results for input(s): Skippy Gault in the last 72 hours. Radiology:  CTA CHEST W WO CONTRAST   Final Result      No CT evidence of pulmonary embolism. Stable right greater than left small bilateral pleural effusions with adjacent atelectasis. CT CHEST WO CONTRAST   Final Result    There is atelectasis in the posterior dependent portions of the lung bases and there is trace pleural fluid. There is no infiltrate or consolidation. XR CHEST (2 VW)   Final Result   No radiographic evidence of acute intrathoracic process. XR CHEST PORTABLE   Final Result   NO ACUTE CARDIOPULMONARY DISEASE      XR FOOT RIGHT (MIN 3 VIEWS)   Final Result      ACUTE FRACTURES OF THE SECOND THROUGH FOURTH (AND PROBABLY FIFTH) METATARSALS, AS NOTED. XR CHEST PORTABLE   Final Result      INFERIOR VENA CAVA APPROACH PACER LEADS IN EXPECTED POSITION. POOR INSPIRATION WITH MILD LEFT BASILAR INFILTRATE/ATELECTASIS. BRONCHOPNEUMONIA SHOULD BE EXCLUDED CLINICALLY. XR ANKLE RIGHT (MIN 3 VIEWS)   Final Result      INCOMPLETELY VISUALIZED METATARSAL FRACTURES, AS NOTED. DEDICATED RIGHT FOOT RADIOGRAPHS ARE SUGGESTED. NONDISPLACED MEDIAL MALLEOLAR FRACTURE. The findings were discussed with the patient's ICU nurse at approximately 7:28 AM on 1/9/2022. CT HEAD WO CONTRAST   Final Result      NO ACUTE INTRACRANIAL PROCESS OR SIGNIFICANT CHANGE FROM PRIOR STUDIES IDENTIFIED. XR CHEST PORTABLE   Final Result   NO ACUTE CARDIOPULMONARY DISEASE. XR CHEST (2 VW)    (Results Pending)     Assessment/Plan:    SSS with sinus pause: S/p dual-chamber pacemaker on 1/11 and RA lead repositioning on 1/14    Covid positive without hypoxia. Vaccinated but not boosted. Does not require further tx at this time. Recommend isolation through 1/18.      R ankle and metatarsal fx: management per Ortho. Conservative tx    Mobilize with PT/OT.  D/c planning to SNF    25 minutes total care time, >1/2 in unit/floor time and care coordination     Marilee Sims DO

## 2022-01-14 NOTE — FLOWSHEET NOTE
Report called to [facility]    07:02 patient daughter Cassie Benítez was updated of her mother pacemaker lead revision.

## 2022-01-14 NOTE — PROGRESS NOTES
INPATIENT PROGRESS NOTES    PATIENT NAME: Courtney Schwarz  MRN: [id number]  SERVICE DATE:  January 14, 2022   SERVICE TIME:  12:50 PM      PRIMARY SERVICE: Pulmonary Disease    CHIEF COMPLAINTS: COVID-19 infection     INTERVAL HPI: Patient seen and examined at bedside, Interval Notes, orders reviewed. Nursing notes noted    Patient reports feeling much better today, eating her lunch. Currently on 2 Liters nasal cannula and sats are 99%. No fever overnight, Urine output 450 last night today she has had 1000 of urine out. Denies sob, no cough, denies nausea of vomiting. Review of system:     GI Abdominal pain No  Skin Rash No    Social History     Tobacco Use    Smoking status: Never Smoker    Smokeless tobacco: Never Used   Substance Use Topics    Alcohol use: Yes     Comment: occasionally     History reviewed. No pertinent family history. OBJECTIVE    Body mass index is 34.38 kg/m². PHYSICAL EXAM:  Vitals:  BP (!) 178/61   Pulse 67   Temp 97.3 °F (36.3 °C) (Oral)   Resp 18   Ht 5' 5\" (1.651 m)   Wt 206 lb 9.1 oz (93.7 kg)   SpO2 99%   BMI 34.38 kg/m²     General: alert, cooperative  Head: normocephalic, atraumatic  Eyes:No gross abnormalities. ENT:  MMM no lesions  Neck:  supple and no masses  Chest : Good air movement no rale no wheezing   Heart[de-identified] Heart sounds are normal.  Regular rate and rhythm without murmur, gallop or rub. S/P PM   ABD:  bowel sounds normal, soft, non-tender  Musculoskeletal : no cyanosis, no clubbing and no edema  Neuro:  Grossly normal  Skin: No rashes or nodules noted.   Lymph node:  no cervical nodes  Urology: No [place]   Psychiatric: appropriate    DATA:   Recent Labs     01/13/22  0510 01/14/22  0658   WBC 9.4 10.1   HGB 10.9* 11.3*   HCT 33.6* 34.9*   MCV 81.9* 81.9*    271     Recent Labs     01/13/22  0510 01/13/22  0510 01/14/22  0658   *  --  139   K 3.6  --  3.9   CL 99  --  102   CO2 25  --  26   BUN 11  --  11   CREATININE 0.34*  --  0.32* GLUCOSE 99  --  93   CALCIUM 8.6  --  8.9   PROT 5.4*  --  5.7*   LABALBU 2.5*  --  2.5*   BILITOT 0.4  --  0.4   ALKPHOS 105  --  91   AST 20  --  15   ALT 31   < > 24   LABGLOM >60.0   < > >60.0   GFRAA >60.0   < > >60.0   GLOB 2.9   < > 3.2    < > = values in this interval not displayed. MV Settings:          No results for input(s): PHART, OSN5UBJ, PO2ART, HTA0DTI, BEART, U3JHGHIV in the last 72 hours. O2 Device: Nasal cannula  O2 Flow Rate (L/min): 2 L/min    ADULT DIET; Regular     MEDICATIONS during current hospitalization:    Continuous Infusions:   sodium chloride 75 mL/hr at 01/14/22 0558    sodium chloride      sodium chloride      sodium chloride      sodium chloride         Scheduled Meds:   sodium chloride flush  5-40 mL IntraVENous 2 times per day    enoxaparin  30 mg SubCUTAneous BID    metoprolol succinate  25 mg Oral Daily    sodium chloride flush  5-40 mL IntraVENous 2 times per day    sodium chloride flush  5-40 mL IntraVENous 2 times per day    losartan  100 mg Oral Daily    amLODIPine  5 mg Oral Daily    pantoprazole  40 mg Oral QAM AC    levothyroxine  112 mcg Oral Daily    sodium chloride flush  5-40 mL IntraVENous 2 times per day       PRN Meds:sodium chloride flush, sodium chloride, sodium chloride flush, sodium chloride, sodium chloride flush, sodium chloride, traMADol **OR** traMADol, ondansetron, acetaminophen, hydrALAZINE, sodium chloride flush, sodium chloride, ondansetron, morphine **OR** morphine    Radiology  XR CHEST (2 VW)    Result Date: 1/12/2022  Exam: XR CHEST (2 VW)  CLINICAL HISTORY:  Post PPM, possile dislodged atrial lead  COMPARISON: Chest x-ray from January 11, 2022 CHEST X-ray, 2 VIEWS FINDINGS: There is  a dual lead cardiac pacemaker in place The cardiomediastinal silhouette is within normal limits. Low inspiratory volumes are low with bronchovascular crowding. There are mild increased markings throughout both lungs.  Bones of the thorax appear intact. No radiographic evidence of acute intrathoracic process. XR ANKLE RIGHT (MIN 3 VIEWS)    Result Date: 1/9/2022  XR ANKLE RIGHT (MIN 3 VIEWS): 1/8/2022 CLINICAL HISTORY: Pain after fall. COMPARISON: None available. TECHNIQUE: Portable AP, lateral and oblique radiographs of the left ankle were obtained. FINDINGS: Best visualized on the AP view, fractures of the second metatarsal and probably third, fourth and fifth metatarsals are noted. Dedicated radiographs of the right foot are suggested. A nondisplaced fracture of the medial malleolus is present. There is no other fracture, dislocation, radiodense foreign bodies, pathologic calcifications, or worrisome bone destruction identified. Mild soft tissue swelling and degenerative changes of the ankle are noted. INCOMPLETELY VISUALIZED METATARSAL FRACTURES, AS NOTED. DEDICATED RIGHT FOOT RADIOGRAPHS ARE SUGGESTED. NONDISPLACED MEDIAL MALLEOLAR FRACTURE. The findings were discussed with the patient's ICU nurse at approximately 7:28 AM on 1/9/2022. XR FOOT RIGHT (MIN 3 VIEWS)    Result Date: 1/9/2022  XR FOOT RIGHT (MIN 3 VIEWS) : 1/9/2022 CLINICAL HISTORY:  fx metatarsal . COMPARISON: Right ankle radiographs 1/8/2022. TECHNIQUE: AP, lateral, and oblique radiographs of the right foot were obtained. FINDINGS: A minimally comminuted fracture of the proximal third of the right third metatarsal is displaced approximately 5 to 6 mm with nearly 1 cm of overriding and foreshortening. An oblique fracture of the distal third of the right second metatarsal is present, with approximately 3 to 4 mm of lateral displacement. There is a nondisplaced fracture of the base of the right fourth metatarsal (and probably fifth metatarsal). A nondisplaced medial malleolar fracture appears unchanged. ACUTE FRACTURES OF THE SECOND THROUGH FOURTH (AND PROBABLY FIFTH) METATARSALS, AS NOTED.      CT HEAD WO CONTRAST    Result Date: 1/8/2022  CT HEAD WO CONTRAST : 1/8/2022 CLINICAL HISTORY: Dizziness. COMPARISON: Head MRI 1/30/2012 and head CT 12/16/2011. TECHNIQUE: Spiral unenhanced images were obtained of the head, with routine multiplanar reconstructions performed. All CT scans at this facility use dose modulation, iterative reconstruction, and/or weight based dosing when appropriate to reduce radiation dose to as low as reasonably achievable. FINDINGS: There is no intracranial hemorrhage, mass effect, midline shift, extra-axial collection, evidence of hydrocephalus, recent ischemic infarct, or skull fracture identified. Mild to moderate generalized cerebral volume loss and mild white matter changes have mildly progressed from the prior studies. The mastoid air cells and visualized paranasal sinuses are essentially clear. NO ACUTE INTRACRANIAL PROCESS OR SIGNIFICANT CHANGE FROM PRIOR STUDIES IDENTIFIED. CT CHEST WO CONTRAST    Result Date: 1/12/2022  EXAMINATION: CT CHEST WO CONTRAST, 1/12/2022 3:21 PM CLINICAL HISTORY:  hypoxia COMPARISON: None TECHNIQUE: Multidetector CT was performed through the chest , All CT scans at this facility use dose modulation, iterative reconstruction, and/or weight based dosing when appropriate to reduce radiation dose to as low as reasonably achievable. 3-D, sagittal and coronal reconstructions were performed. FINDINGS There is a left-sided cardiac pacemaker in place. The thyroid gland is within normal limits. The axillary regions demonstrate no significant lymphadenopathy or solid or cystic lesions. There is no mediastinal lymphadenopathy. The heart and pericardium are within normal limits. There is no pericardial effusion. The great vessels of the chest are normal in course and caliber. There is dependent atelectasis in posterior portions of the lung bases. There are no consolidations or infiltrates. There are trace bilateral pleural effusions. There are no acute bony abnormalities.  There is a small hiatal hernia and there is a 5 cm cystic lesion in the left lower liver. There is atelectasis in the posterior dependent portions of the lung bases and there is trace pleural fluid. There is no infiltrate or consolidation. CTA CHEST W WO CONTRAST    Result Date: 1/13/2022  EXAMINATION:  CHEST CT WITH CONTRAST (PULMONARY EMBOLISM PROTOCOL) Indication:   hypoxia + high D Dimer Technique:  Spiral CT acquisition of the chest from the thoracic inlet to the upper abdomen following IV contrast.  Maximum intensity projection (MIP)reconstructions were performed. All CT scans at this facility use dose modulation, iterative reconstruction, and/or weight based dosing when appropriate to reduce radiation dose to as low as reasonably achievable. Contrast: mL IV Comparison:  CT chest 1/12/2022  RESULT: Limitations:  None. Evaluation for thromboembolic disease:      - Right heart chambers:  No thromboembolic disease.      - Main pulmonary arteries:  No thromboembolic disease.      - Lobar pulmonary arteries:  No thromboembolic disease.        - Segmental pulmonary arteries:  No thromboembolic disease.        - Subsegmental pulmonary arteries:  No thromboembolic disease. Lines, tubes, and devices:  Stable left-sided ICD. Lung parenchyma and pleura:  Central airways are patent without endobronchial lesions. Redemonstration of right greater than left small bilateral pleural effusions with adjacent atelectasis unchanged since yesterday. Scattered patchy opacities for example in the subpleural left upper lobe is also unchanged and likely secondary to atelectasis. No suspicious pulmonary nodule. No pneumothorax. Thoracic inlet, heart, and mediastinum:  No lymphadenopathy in the axillary, mediastinal, or hilar regions. Mild atherosclerotic calcification of the thoracic aorta. The thoracic aorta and main pulmonary artery are normal in caliber. Stable cardiomegaly. . Moderate coronary artery atherosclerotic calcifications are noted, although the study is not optimized for coronary assessment. No pericardial effusion or thickening. Thyroid gland is unremarkable. Small hiatal hernia. Bones and soft tissues:  No destructive bone lesion. Chest wall is unremarkable. Upper abdomen:  Partially imaged right renal superior pole simple cyst. . No CT evidence of pulmonary embolism. Stable right greater than left small bilateral pleural effusions with adjacent atelectasis. XR CHEST PORTABLE    Result Date: 1/12/2022  EXAMINATION: XR CHEST PORTABLE CLINICAL HISTORY: PACEMAKER PLACEMENT COMPARISONS: JANUARY 9, 2022 FINDINGS: Pacemaker generator and wires unchanged. Osseous structures intact. Cardiopericardial silhouette normal. Lungs clear. NO ACUTE CARDIOPULMONARY DISEASE    XR CHEST PORTABLE    Result Date: 1/9/2022  XR CHEST PORTABLE : 1/9/2022 CLINICAL HISTORY:  Pacemaker placement and rule out pneumothorax . COMPARISON: 1/8/2022. TECHNIQUE: A portable upright AP radiograph of the chest was obtained. FINDINGS: The heart is mildly enlarged with an inferior vena cava approach pacer lead with its tip overlying the cardiac apex. A poor inspiratory volume is present with mild left basilar infiltrate/atelectasis. Bronchopneumonia should be excluded clinically. There is no sizable pleural effusion, gross vascular congestion, pneumothorax, or displaced fractures identified. INFERIOR VENA CAVA APPROACH PACER LEADS IN EXPECTED POSITION. POOR INSPIRATION WITH MILD LEFT BASILAR INFILTRATE/ATELECTASIS. BRONCHOPNEUMONIA SHOULD BE EXCLUDED CLINICALLY. XR CHEST PORTABLE    Result Date: 1/8/2022  EXAMINATION: XR CHEST PORTABLE CLINICAL HISTORY: WEAKNESS COMPARISONS: DECEMBER 15, 2011 FINDINGS: Osseous structures intact. Cardiopericardial silhouette normal. Pulmonary vasculature normal. Lungs clear. NO ACUTE CARDIOPULMONARY DISEASE. Most recent    Chest CT      WITH CONTRAST:No results found for this or any previous visit.        WITHOUT CONTRAST: Results for orders placed during the hospital encounter of 01/08/22    CT CHEST WO CONTRAST    Narrative  EXAMINATION: CT CHEST [address], 1/12/2022 3:21 PM    CLINICAL HISTORY:  hypoxia    COMPARISON: None    TECHNIQUE:  Multidetector CT was performed through the chest ,  All CT scans at this facility use dose modulation, iterative reconstruction, and/or weight based dosing when appropriate to reduce radiation dose to as low as reasonably achievable. 3-D, sagittal and coronal reconstructions were performed. FINDINGS  There is a left-sided cardiac pacemaker in place. The thyroid gland is within normal limits. The axillary regions demonstrate no significant lymphadenopathy or solid or cystic lesions. There is no mediastinal lymphadenopathy. The heart and pericardium are within normal limits. There is no pericardial effusion. The great vessels of the chest are normal in course and caliber. There is dependent atelectasis in posterior portions of the lung bases. There are no consolidations or infiltrates. There are trace bilateral pleural effusions. There are no acute bony abnormalities. There is a small hiatal hernia and there is a 5 cm cystic lesion in the left lower liver. Impression  There is atelectasis in the posterior dependent portions of the lung bases and there is trace pleural fluid. There is no infiltrate or consolidation. CXR      2-view: Results for orders placed during the hospital encounter of 01/08/22    XR CHEST (2 VW)    Narrative  Exam: XR CHEST (2 VW)    CLINICAL HISTORY:  Post PPM, possile dislodged atrial lead    COMPARISON: Chest x-ray from January 11, 2022    CHEST X-ray, 2 VIEWS    FINDINGS:    There is  a dual lead cardiac pacemaker in place  The cardiomediastinal silhouette is within normal limits. Low inspiratory volumes are low with bronchovascular crowding. There are mild increased markings throughout both lungs. Bones of the thorax appear intact.     Impression  No radiographic evidence of acute intrathoracic process. Portable: Results for orders placed during the hospital encounter of 01/08/22    XR CHEST PORTABLE    Narrative  EXAMINATION: XR CHEST PORTABLE    CLINICAL HISTORY: PACEMAKER PLACEMENT    COMPARISONS: JANUARY 9, 2022    FINDINGS: Pacemaker generator and wires unchanged. Osseous structures intact. Cardiopericardial silhouette normal. Lungs clear. Impression  NO ACUTE CARDIOPULMONARY DISEASE      Echo No results found for this or any previous visit. IMPRESSION AND SUGGESTION:  Patient is at risk due to   · COVID-19 infection   · Hypercoagulable state secondary to above   · Sinus pauses/S/P pacemaker   · Volume overload   · Pleural effusions R>L     Recommendations   · O2 to keep sats 90-92%  · DVT Prophylaxis   · PUD prophylaxis  · D/C IV fluids   · Lasix 20 mg IV times one   · Monitor urine output and avoid overload  · Pulmonary hygiene         I spent 15 min with this patient, greater the 50% of this time was spent in counseling and/or coordinating of care.     Electronically signed by NORM Cope CNP,

## 2022-01-14 NOTE — BRIEF OP NOTE
Brief Postoperative Note      Patient: Herman Serrato  YOB: 1940  MRN: [id number]    Date of Procedure:  01/14/22    Successful repositioning of the right atrial lead      Electronically signed by Yevgeniy Armenta MD on 1/14/2022 at 8:43 AM

## 2022-01-14 NOTE — PROGRESS NOTES
INPATIENT PROGRESS NOTES    PATIENT NAME: Lyndsey Coyle  MRN: [id number]  SERVICE DATE:  January 14, 2022   SERVICE TIME:  5:11 PM      PRIMARY SERVICE: Pulmonary Disease    CHIEF COMPLAINTS: COVID-19 infection    INTERVAL HPI: Patient seen and examined at bedside, Interval Notes, orders reviewed. Nursing notes noted    Feels much better today, she complains that the room is cold, denies chest pain, she is currently on 2 L O2 saturation 99%, urine output 450 cc, she is +2.2 L. No chest pain, no nausea or vomiting. Review of system:     GI Abdominal pain No  Skin Rash No    Social History     Tobacco Use    Smoking status: Never Smoker    Smokeless tobacco: Never Used   Substance Use Topics    Alcohol use: Yes     Comment: occasionally     History reviewed. No pertinent family history. OBJECTIVE    Body mass index is 34.38 kg/m². PHYSICAL EXAM:  Vitals:  BP (!) 145/59   Pulse 74   Temp 97.6 °F (36.4 °C) (Oral)   Resp 18   Ht 5' 5\" (1.651 m)   Wt 206 lb 9.1 oz (93.7 kg)   SpO2 96%   BMI 34.38 kg/m²     General: alert, cooperative, no distress  Head: normocephalic, atraumatic  Eyes:No gross abnormalities. ENT:  MMM no lesions  Neck:  supple and no masses  Chest : Good air movement, no wheezing, no rales, nontender, tympanic  Heart[de-identified] Heart sounds are normal.  Regular rate and rhythm without murmur, gallop or rub. ABD:  symmetric, soft, nontender, no guarding or rebound  Musculoskeletal : no cyanosis, no clubbing and no edema  Neuro:  Grossly normal  Skin: No rashes or nodules noted.   Lymph node:  no cervical nodes  Urology: No [place]   Psychiatric: appropriate    DATA:   Recent Labs     01/13/22  0510 01/14/22  0658   WBC 9.4 10.1   HGB 10.9* 11.3*   HCT 33.6* 34.9*   MCV 81.9* 81.9*    271     Recent Labs     01/13/22  0510 01/13/22  0510 01/14/22  0658   *  --  139   K 3.6  --  3.9   CL 99  --  102   CO2 25  --  26   BUN 11  --  11   CREATININE 0.34*  --  0.32*   GLUCOSE 99  -- fracture, dislocation, radiodense foreign bodies, pathologic calcifications, or worrisome bone destruction identified. Mild soft tissue swelling and degenerative changes of the ankle are noted. INCOMPLETELY VISUALIZED METATARSAL FRACTURES, AS NOTED. DEDICATED RIGHT FOOT RADIOGRAPHS ARE SUGGESTED. NONDISPLACED MEDIAL MALLEOLAR FRACTURE. The findings were discussed with the patient's ICU nurse at approximately 7:28 AM on 1/9/2022. XR FOOT RIGHT (MIN 3 VIEWS)    Result Date: 1/9/2022  XR FOOT RIGHT (MIN 3 VIEWS) : 1/9/2022 CLINICAL HISTORY:  fx metatarsal . COMPARISON: Right ankle radiographs 1/8/2022. TECHNIQUE: AP, lateral, and oblique radiographs of the right foot were obtained. FINDINGS: A minimally comminuted fracture of the proximal third of the right third metatarsal is displaced approximately 5 to 6 mm with nearly 1 cm of overriding and foreshortening. An oblique fracture of the distal third of the right second metatarsal is present, with approximately 3 to 4 mm of lateral displacement. There is a nondisplaced fracture of the base of the right fourth metatarsal (and probably fifth metatarsal). A nondisplaced medial malleolar fracture appears unchanged. ACUTE FRACTURES OF THE SECOND THROUGH FOURTH (AND PROBABLY FIFTH) METATARSALS, AS NOTED. CT HEAD WO CONTRAST    Result Date: 1/8/2022  CT HEAD WO CONTRAST : 1/8/2022 CLINICAL HISTORY: Dizziness. COMPARISON: Head MRI 1/30/2012 and head CT 12/16/2011. TECHNIQUE: Spiral unenhanced images were obtained of the head, with routine multiplanar reconstructions performed. All CT scans at this facility use dose modulation, iterative reconstruction, and/or weight based dosing when appropriate to reduce radiation dose to as low as reasonably achievable. FINDINGS: There is no intracranial hemorrhage, mass effect, midline shift, extra-axial collection, evidence of hydrocephalus, recent ischemic infarct, or skull fracture identified.   Mild to moderate generalized cerebral volume loss and mild white matter changes have mildly progressed from the prior studies. The mastoid air cells and visualized paranasal sinuses are essentially clear. NO ACUTE INTRACRANIAL PROCESS OR SIGNIFICANT CHANGE FROM PRIOR STUDIES IDENTIFIED. XR CHEST PORTABLE    Result Date: 1/12/2022  EXAMINATION: XR CHEST PORTABLE CLINICAL HISTORY: PACEMAKER PLACEMENT COMPARISONS: JANUARY 9, 2022 FINDINGS: Pacemaker generator and wires unchanged. Osseous structures intact. Cardiopericardial silhouette normal. Lungs clear. NO ACUTE CARDIOPULMONARY DISEASE    XR CHEST PORTABLE    Result Date: 1/9/2022  XR CHEST PORTABLE : 1/9/2022 CLINICAL HISTORY:  Pacemaker placement and rule out pneumothorax . COMPARISON: 1/8/2022. TECHNIQUE: A portable upright AP radiograph of the chest was obtained. FINDINGS: The heart is mildly enlarged with an inferior vena cava approach pacer lead with its tip overlying the cardiac apex. A poor inspiratory volume is present with mild left basilar infiltrate/atelectasis. Bronchopneumonia should be excluded clinically. There is no sizable pleural effusion, gross vascular congestion, pneumothorax, or displaced fractures identified. INFERIOR VENA CAVA APPROACH PACER LEADS IN EXPECTED POSITION. POOR INSPIRATION WITH MILD LEFT BASILAR INFILTRATE/ATELECTASIS. BRONCHOPNEUMONIA SHOULD BE EXCLUDED CLINICALLY. XR CHEST PORTABLE    Result Date: 1/8/2022  EXAMINATION: XR CHEST PORTABLE CLINICAL HISTORY: WEAKNESS COMPARISONS: DECEMBER 15, 2011 FINDINGS: Osseous structures intact. Cardiopericardial silhouette normal. Pulmonary vasculature normal. Lungs clear. NO ACUTE CARDIOPULMONARY DISEASE.        CT chest shows no PE       IMPRESSION AND SUGGESTION:  Patient is at risk due to   · COVID-19 infection  · Hypercoagulable state secondary to COVID-19 infection, no evidence of VTE  · Sinus pauses status post pacemaker  · Probable volume overload      Recommendation  · O2 to keep sat 90 to 92%  · Continue DVT prophylaxis  · DC IV fluids  · Repeat Lasix x1 today  · Incentive spirometry and flutter device  · Maintain euvolemic        Nella MD Fabiola  1/14/2022  5:11 PM

## 2022-01-14 NOTE — PROGRESS NOTES
[facility] Daily Progress Note  Name: Darron Schmid  Age: 78 y.o. Gender: female  CodeStatus: Full Code    Primary Cardiology : Dr. Elias Conde Cardiology : Dr. Fredy [facility]ENG[facility]  Primary Care Provider: Kelly Preston MD  Admission Date: 1/8/2022     I have personally performed a complete face to face history and physical on this patient. I have reviewed the notations as entered by OLEG Chance I have personally  formulated the impression and plan on this patient and amended as necessary. Booker Monique MD [address]    Awake and alert after right atrial lead adjustment. No cardiovascular symptoms. Feeling much better. Presenting symptoms were classic for heart block. Current exam without evidence of CHF    As described below patient will be going to nursing home for rehabilitation probably tomorrow. Sam Marte MD      Chief complaint/Associated symptoms:   Darron Schmid was seen from doorway due to COVID positive. She is resting comfortably in bed s/p  PPM lead revision. Pending discharge to [address] this weekend. Assessment:   1. Syncope/Asystole, SSS / CHB, recent temporary PM, now removed. S/P dual chamber PPM placement with Dr. Niall Brothers 1/12/2022 with right atrial lead repositioning 1/14/2022. (([employer] Maggie XTDR MRI model number [id number], serial number [id number])    2. Hypertension: Labile blood pressures with noted blood pressures 142'F - 116'W systolic. 3. COVID Positive: ID following     Plan  1. Monitor on telemetry    2. Continue current medications    3. Plan to discharge to [address] possibly this weekend    4. Follow up with Dr. Niall Brothers and Dr. Lady Scott has been arranged. 5. Further recommendation per Dr. Shanita Mckinney History/Testing  1. negative echocardiogram 1996 and November 2019  2.  Negative carotid ultrasound for significant carotid artery disease, ICAs less than 50%, November 2019.  3. Abdominal aortic aneurysm, newly diagnosed, 4.5 cm, none by ultrasound 5/2021  4. Abnormal Lexiscan Myoview perfusion stress test for mild anterior lateral apical ischemia, November 2019  5. Negative cardiac catheterization for significant coronary artery disease 12/6/2019      HPI:   This is an admission history and physical due to the patient having some asystole. This was discussed with the primary hospitalist.  We will admit with their consult. This is a pleasant patient of Dr. Justin Gutierrez who has a history of hypertension, obstructive sleep apnea, and there is in his problem list some idioventricular rhythm, although this has not been well delineated. She has a history of a heart catheterization, which showed no severe obstructive disease. She has no severe carotid disease. She has a questionable aneurysm, but her ultrasound showed no significant abdominal aortic aneurysm.     Most recently, the patient has felt ill over the last couple of weeks. She has also had some continuous nausea. She has also had a gabo syncopal spell this morning when she woke up on the floor. She has had several lightheaded and dizzy spells since that time and she continues to complain of nausea.     She was placed on the monitor and had definitely episodes of asystole,  which certainly could correlate with her symptoms.     Physical Exam  Physical exam deferred due to COVID positive       Allergies: No Known Allergies    Medications:  Reviewed  Home Medications    Infusion Medications:    sodium chloride 75 mL/hr at 01/14/22 0558    sodium chloride      sodium chloride      sodium chloride      sodium chloride       Scheduled Medications:    sodium chloride flush  5-40 mL IntraVENous 2 times per day    enoxaparin  30 mg SubCUTAneous BID    metoprolol succinate  25 mg Oral Daily    sodium chloride flush  5-40 mL IntraVENous 2 times per day    sodium chloride flush  5-40 mL IntraVENous 2 times per day    losartan  100 mg Oral Daily    amLODIPine  5 mg Oral Daily    pantoprazole  40 mg Oral QAM AC    levothyroxine  112 mcg Oral Daily    sodium chloride flush  5-40 mL IntraVENous 2 times per day     PRN Meds: sodium chloride flush, sodium chloride, sodium chloride flush, sodium chloride, sodium chloride flush, sodium chloride, traMADol **OR** traMADol, ondansetron, acetaminophen, hydrALAZINE, sodium chloride flush, sodium chloride, ondansetron, morphine **OR** morphine    Vitals  Vitals:    01/14/22 0945   BP:    Pulse: 67   Resp:    Temp:    SpO2:        I&O  450 ml urine output     Telemetry:  Paced    Labs:   Recent Labs     01/12/22  0457 01/13/22  0510 01/14/22  0658   WBC 10.4 9.4 10.1   HGB 10.9* 10.9* 11.3*   HCT 33.7* 33.6* 34.9*    240 271     Recent Labs     01/13/22  0510 01/14/22  0658   * 139   K 3.6 3.9   CL 99 102   CO2 25 26   BUN 11 11   CREATININE 0.34* 0.32*   CALCIUM 8.6 8.9     Recent Labs     01/13/22  0510 01/14/22  0658   AST 20 15   ALT 31 24   BILITOT 0.4 0.4   ALKPHOS 105 91     No results for input(s): INR in the last 72 hours. No results for input(s): Trayc Zenon in the last 72 hours. Urinalysis:   Lab Results   Component Value Date    NITRU Negative 01/08/2022    WBCUA 0-2 12/15/2011    BACTERIA NOT SEEN 12/15/2011    RBCUA 50-99 12/15/2011    BLOODU Negative 01/08/2022    SPECGRAV 1.015 01/08/2022    GLUCOSEU Negative 01/08/2022    GLUCOSEU NEG 02/13/2012       Radiology:   Most recent    Chest CT      WITH CONTRAST:No results found for this or any previous visit.        WITHOUT CONTRAST: Results for orders placed during the hospital encounter of 01/08/22    CT CHEST WO CONTRAST    Narrative  EXAMINATION: CT CHEST [address], 1/12/2022 3:21 PM    CLINICAL HISTORY:  hypoxia    COMPARISON: None    TECHNIQUE:  Multidetector CT was performed through the chest ,  All CT scans at this facility use dose modulation, iterative reconstruction, and/or weight based dosing when appropriate to reduce radiation dose to as low as reasonably achievable. 3-D, sagittal and coronal reconstructions were performed. FINDINGS  There is a left-sided cardiac pacemaker in place. The thyroid gland is within normal limits. The axillary regions demonstrate no significant lymphadenopathy or solid or cystic lesions. There is no mediastinal lymphadenopathy. The heart and pericardium are within normal limits. There is no pericardial effusion. The great vessels of the chest are normal in course and caliber. There is dependent atelectasis in posterior portions of the lung bases. There are no consolidations or infiltrates. There are trace bilateral pleural effusions. There are no acute bony abnormalities. There is a small hiatal hernia and there is a 5 cm cystic lesion in the left lower liver. Impression  There is atelectasis in the posterior dependent portions of the lung bases and there is trace pleural fluid. There is no infiltrate or consolidation. CXR      2-view: Results for orders placed during the hospital encounter of 01/08/22    XR CHEST (2 VW)    Narrative  Exam: XR CHEST (2 VW)    CLINICAL HISTORY:  Post PPM, possile dislodged atrial lead    COMPARISON: Chest x-ray from January 11, 2022    CHEST X-ray, 2 VIEWS    FINDINGS:    There is  a dual lead cardiac pacemaker in place  The cardiomediastinal silhouette is within normal limits. Low inspiratory volumes are low with bronchovascular crowding. There are mild increased markings throughout both lungs. Bones of the thorax appear intact. Impression  No radiographic evidence of acute intrathoracic process. Portable: Results for orders placed during the hospital encounter of 01/08/22    XR CHEST PORTABLE    Narrative  EXAMINATION: XR CHEST PORTABLE    CLINICAL HISTORY: PACEMAKER PLACEMENT    COMPARISONS: JANUARY 9, 2022    FINDINGS: Pacemaker generator and wires unchanged. Osseous structures intact. Cardiopericardial silhouette normal. Lungs clear. Impression  NO ACUTE CARDIOPULMONARY DISEASE          Active Hospital Problems    Diagnosis Date Noted    COVID-19 virus infection [U07.1]     Sick sinus syndrome ([address] [I49.5]     Symptomatic bradycardia [R00.1]     Syncope and collapse [R55]        Additional work up or/and treatment plan may be added today or then after based on clinical progression. I am managing a portion of pt care. Some medical issues are handled by other specialists. Additional work up and treatment should be done in out pt setting by pt PCP and other out pt providers. In addition to examining and evaluating pt, I spent additional time explaining care, normaland abnormal findings, and treatment plan. All of pt questions were answered. Counseling, diet and education were provided. Case will be discussed with nursing staff when appropriate. Family will be updated if and whenappropriate.       Electronically signed by NORM Goode CNP on 1/14/2022 at 11:17 AM

## 2022-01-14 NOTE — PROGRESS NOTES
Physical Therapy Missed Treatment   Facility/Department: [facility] MED SURG V237/U154-26    NAME: Jack Arias    : 1940 (78 y.o.)  MRN: [id number]    Account: [de-identified]  Gender: female    Chart reviewed, attempted PT at 46. Patient unavailable 2° to:    [x] Hold per nsg request. Pt recently back from PPM lead revision. Hold PT this date per RN.     [] Pt declined    [] Nsg notified   [] Other notified    [] Pt. . off floor for test/procedure. [] Pt. Unavailable       Will attempt PT treatment again at earliest convenience.       Electronically signed by Maru Jolley PTA on 22 at 1:19 PM EST

## 2022-01-14 NOTE — CARE COORDINATION
This LSW called and spoke with dtr. Hector Goes today at 11:20am. I notified her that [address] has accepted patient and has begun pre-cert. I have also communicated this to patient. IMM completed , [id number] completed.   Electronically signed by ANIRUDH Brown, LSW on 1/14/22 at 11:21 AM EST

## 2022-01-14 NOTE — PROGRESS NOTES
Infectious Disease     Patient Name: Amadou Meadows  Date: 1/14/2022  YOB: 1940  Medical Record Number: [id number]          COVID-19 infection    Vaccinated patient  No evidence of pneumonia  Normal procalcitonin      Currently on 2 L nasal           01/11/2022  OPERATION PERFORMED:  1. Fluoroscopy. 2.  Pocket flushing. 3.  Dual-chamber pacemaker implantation        Review of Systems   Constitutional: Negative. Respiratory: Negative. Cardiovascular: Negative. Gastrointestinal: Negative. Physical Exam  Constitutional:       Appearance: She is not ill-appearing. Cardiovascular:      Heart sounds: Normal heart sounds. No murmur heard. Pulmonary:      Effort: Pulmonary effort is normal. No respiratory distress. Breath sounds: No stridor. No wheezing, rhonchi or rales. Abdominal:      General: Abdomen is flat. Bowel sounds are normal. There is no distension. Palpations: Abdomen is soft. There is no mass. Tenderness: There is no abdominal tenderness. There is no guarding. Skin:               Blood pressure (!) 152/62, pulse 73, temperature 97.8 °F (36.6 °C), temperature source Oral, resp. rate 18, height 5' 5\" (1.651 m), weight 206 lb 9.1 oz (93.7 kg), SpO2 94 %.       .   Lab Results   Component Value Date    WBC 10.1 01/14/2022    HGB 11.3 (L) 01/14/2022    HCT 34.9 (L) 01/14/2022    MCV 81.9 (L) 01/14/2022     01/14/2022     Lab Results   Component Value Date     01/14/2022    K 3.9 01/14/2022     01/14/2022    CO2 26 01/14/2022    BUN 11 01/14/2022    CREATININE 0.32 01/14/2022    GLUCOSE 93 01/14/2022    GLUCOSE 89 12/03/2019    CALCIUM 8.9 01/14/2022            XR CHEST PORTABLE : 1/9/2022       CLINICAL HISTORY:  Pacemaker placement and rule out pneumothorax .       COMPARISON: 1/8/2022.       TECHNIQUE: A portable upright AP radiograph of the chest was obtained.           FINDINGS:       The heart is mildly enlarged with an inferior vena cava approach pacer lead with its tip overlying the cardiac apex.       A poor inspiratory volume is present with mild left basilar infiltrate/atelectasis. Bronchopneumonia should be excluded clinically.       There is no sizable pleural effusion, gross vascular congestion, pneumothorax, or displaced fractures identified.                   Impression       INFERIOR VENA CAVA APPROACH PACER LEADS IN EXPECTED POSITION.       POOR INSPIRATION WITH MILD LEFT BASILAR INFILTRATE/ATELECTASIS.       BRONCHOPNEUMONIA SHOULD BE EXCLUDED CLINICALLY.              ASSESSMENT:  PLAN:      COVID-19 infection  Hypercoagulable    Given the patient is on low amount of oxygen given negative chest x-ray minimal symptoms will not start dexamethasone at this time    Sign off infectious disease

## 2022-01-15 PROBLEM — I46.9 ASYSTOLE (HCC): Status: ACTIVE | Noted: 2022-01-15

## 2022-01-15 PROBLEM — I46.9 ASYSTOLE (HCC): Status: RESOLVED | Noted: 2022-01-15 | Resolved: 2022-01-15

## 2022-01-15 LAB
BASOPHILS ABSOLUTE: 0.1 K/UL (ref 0–0.2)
BASOPHILS RELATIVE PERCENT: 0.9 %
EOSINOPHILS ABSOLUTE: 0.6 K/UL (ref 0–0.7)
EOSINOPHILS RELATIVE PERCENT: 5.8 %
HCT VFR BLD CALC: 34.6 % (ref 37–47)
HEMOGLOBIN: 11.1 G/DL (ref 12–16)
LYMPHOCYTES ABSOLUTE: 2.1 K/UL (ref 1–4.8)
LYMPHOCYTES RELATIVE PERCENT: 18.9 %
MCH RBC QN AUTO: 26.6 PG (ref 27–31.3)
MCHC RBC AUTO-ENTMCNC: 32.1 % (ref 33–37)
MCV RBC AUTO: 82.8 FL (ref 82–100)
MONOCYTES ABSOLUTE: 1.3 K/UL (ref 0.2–0.8)
MONOCYTES RELATIVE PERCENT: 11.7 %
NEUTROPHILS ABSOLUTE: 7 K/UL (ref 1.4–6.5)
NEUTROPHILS RELATIVE PERCENT: 62.7 %
PDW BLD-RTO: 14 % (ref 11.5–14.5)
PLATELET # BLD: 269 K/UL (ref 130–400)
RBC # BLD: 4.18 M/UL (ref 4.2–5.4)
WBC # BLD: 11.2 K/UL (ref 4.8–10.8)

## 2022-01-15 PROCEDURE — 2580000003 HC RX 258: Performed by: INTERNAL MEDICINE

## 2022-01-15 PROCEDURE — 85025 COMPLETE CBC W/AUTO DIFF WBC: CPT

## 2022-01-15 PROCEDURE — 5A1223Z PERFORMANCE OF CARDIAC PACING, CONTINUOUS: ICD-10-PCS | Performed by: INTERNAL MEDICINE

## 2022-01-15 PROCEDURE — 6360000002 HC RX W HCPCS: Performed by: INTERNAL MEDICINE

## 2022-01-15 PROCEDURE — 6370000000 HC RX 637 (ALT 250 FOR IP): Performed by: INTERNAL MEDICINE

## 2022-01-15 PROCEDURE — 2060000000 HC ICU INTERMEDIATE R&B

## 2022-01-15 PROCEDURE — 2580000003 HC RX 258: Performed by: NURSE PRACTITIONER

## 2022-01-15 PROCEDURE — 2700000000 HC OXYGEN THERAPY PER DAY

## 2022-01-15 PROCEDURE — 36415 COLL VENOUS BLD VENIPUNCTURE: CPT

## 2022-01-15 PROCEDURE — 97535 SELF CARE MNGMENT TRAINING: CPT

## 2022-01-15 RX ORDER — LACTULOSE 10 G/15ML
30 SOLUTION ORAL EVERY 4 HOURS
Status: COMPLETED | OUTPATIENT
Start: 2022-01-15 | End: 2022-01-15

## 2022-01-15 RX ORDER — SENNA PLUS 8.6 MG/1
1 TABLET ORAL NIGHTLY
Status: DISCONTINUED | OUTPATIENT
Start: 2022-01-15 | End: 2022-01-16 | Stop reason: HOSPADM

## 2022-01-15 RX ORDER — BISACODYL 10 MG
10 SUPPOSITORY, RECTAL RECTAL ONCE
Status: COMPLETED | OUTPATIENT
Start: 2022-01-15 | End: 2022-01-15

## 2022-01-15 RX ORDER — POLYETHYLENE GLYCOL 3350 17 G/17G
17 POWDER, FOR SOLUTION ORAL DAILY
Status: DISCONTINUED | OUTPATIENT
Start: 2022-01-15 | End: 2022-01-16 | Stop reason: HOSPADM

## 2022-01-15 RX ADMIN — LACTULOSE 30 G: 20 SOLUTION ORAL at 15:35

## 2022-01-15 RX ADMIN — Medication 10 ML: at 09:40

## 2022-01-15 RX ADMIN — AMLODIPINE BESYLATE 5 MG: 5 TABLET ORAL at 08:42

## 2022-01-15 RX ADMIN — POLYETHYLENE GLYCOL 3350 17 G: 17 POWDER, FOR SOLUTION ORAL at 11:11

## 2022-01-15 RX ADMIN — ENOXAPARIN SODIUM 30 MG: 100 INJECTION SUBCUTANEOUS at 08:42

## 2022-01-15 RX ADMIN — BISACODYL 10 MG: 10 SUPPOSITORY RECTAL at 18:13

## 2022-01-15 RX ADMIN — Medication 10 ML: at 09:39

## 2022-01-15 RX ADMIN — ENOXAPARIN SODIUM 30 MG: 100 INJECTION SUBCUTANEOUS at 20:59

## 2022-01-15 RX ADMIN — METOPROLOL SUCCINATE 25 MG: 25 TABLET, FILM COATED, EXTENDED RELEASE ORAL at 08:42

## 2022-01-15 RX ADMIN — ONDANSETRON 4 MG: 2 INJECTION INTRAMUSCULAR; INTRAVENOUS at 08:41

## 2022-01-15 RX ADMIN — TRAMADOL HYDROCHLORIDE 100 MG: 50 TABLET, COATED ORAL at 20:59

## 2022-01-15 RX ADMIN — ONDANSETRON 4 MG: 2 INJECTION INTRAMUSCULAR; INTRAVENOUS at 15:40

## 2022-01-15 RX ADMIN — SENNOSIDES 8.6 MG: 8.6 TABLET, COATED ORAL at 20:59

## 2022-01-15 RX ADMIN — PANTOPRAZOLE SODIUM 40 MG: 40 TABLET, DELAYED RELEASE ORAL at 06:50

## 2022-01-15 RX ADMIN — LACTULOSE 30 G: 20 SOLUTION ORAL at 11:11

## 2022-01-15 RX ADMIN — LEVOTHYROXINE SODIUM 112 MCG: 112 TABLET ORAL at 06:50

## 2022-01-15 RX ADMIN — LOSARTAN POTASSIUM 100 MG: 100 TABLET, FILM COATED ORAL at 08:42

## 2022-01-15 ASSESSMENT — PAIN DESCRIPTION - LOCATION: LOCATION: ANKLE

## 2022-01-15 ASSESSMENT — PAIN DESCRIPTION - ORIENTATION: ORIENTATION: RIGHT

## 2022-01-15 ASSESSMENT — PAIN SCALES - GENERAL
PAINLEVEL_OUTOF10: 4
PAINLEVEL_OUTOF10: 8
PAINLEVEL_OUTOF10: 0

## 2022-01-15 ASSESSMENT — PAIN DESCRIPTION - PAIN TYPE: TYPE: ACUTE PAIN

## 2022-01-15 NOTE — PLAN OF CARE
Nutrition Problem #1: Inadequate oral intake  Intervention: Food and/or Nutrient Delivery: Continue Current Diet,Start Oral Nutrition Supplement (continue regular diet. Provide high calorie ONS @ B, pudding @ L, frozen @D)  Nutritional Goals: PO intake >50%. N/V/C to resolve.

## 2022-01-15 NOTE — PROGRESS NOTES
Pt having N/V this AM. Given IV zofran. Seemed to help for a few hours. Pt given miralax and 2 doses of lactulose for constipation x1 week. No BM yet. Will give suppository if pt does not move bowels by end of shift. Pt has poor appetite today due to n/v episode x2.  Daughter updated on pt condition

## 2022-01-15 NOTE — PROGRESS NOTES
Comprehensive Nutrition Assessment    Type and Reason for Visit:  Initial,RD Nutrition Re-Screen/LOS (LOS day #7)    Nutrition Recommendations/Plan:   · Provide high calorie supplement @ B, pudding supplement @ L, frozen supplement @ D  · Follow up for acceptance  · May need to consider alternative nutrition support, poor PO intake since admission    Nutrition Assessment:  Pt at risk for malnutrition evidenced by prolonged poor PO intake d/t N/V. Will trial nutrition supplements TID and continue to monitor. Malnutrition Assessment:  Malnutrition Status: At risk for malnutrition (Comment)    Context:  Acute Illness     Findings of the 6 clinical characteristics of malnutrition:  Energy Intake:  7 - 50% or less of estimated energy requirements for 5 or more days  Weight Loss:  No significant weight loss     Body Fat Loss:  Unable to assess     Muscle Mass Loss:  Unable to assess    Fluid Accumulation:  Unable to assess     Strength:  Not Performed    Estimated Daily Nutrient Needs:  Energy (kcal):  1400-1590kcal (15-17kcal/kg); Weight Used for Energy Requirements:  Current (93.7kg)     Protein (g):  68-73g (1.2-1.3g/kg); Weight Used for Protein Requirements:  Ideal (56.8kg)        Fluid (ml/day):  ~1590ml; Method Used for Fluid Requirements:  1 ml/kcal      Nutrition Related Findings:  Pt presents with N/V/C gastroenteritis. +Covid. NS @75ml/hr. trace gen, +1 RLE edema noted. Last BM 1/7, receiving lactulose. Poor intakes and appetite. Pt eating 0% x 1-2 days d/t nausea. Did not try previous supplements, will trial again. Pt agreeable. Pt slightly confused when talking, difficult to obtain nutr hx. Pmhx: frequent falls, [place], HTN, HLD, hypothyroid on Synthroid. Labs WNL      Wounds:  None       Current Nutrition Therapies:    ADULT DIET;  Regular    Anthropometric Measures:  · Height: 5' 5\" (165.1 cm)  · Current Body Weight: 206 lb 9.1 oz (93.7 kg) (1/12 bed; edema present)   · Admission Body Weight: 200 lb

## 2022-01-15 NOTE — PROGRESS NOTES
[facility] Daily Progress Note  Name: Jcak Arias  Age: 78 y.o. Gender: female  CodeStatus: Full Code    Primary Cardiology : Dr. Judy Bernal  Rounding Cardiology : Dr. Khushbu Moulton     Primary Care Provider: Lauri Mcdaniel MD  Admission Date: 1/8/2022       Chief complaint/Associated symptoms:   Today she said she is nauseated. She had 1 episode of vomiting no blood. Denies abdominal pain. Also says she is dizzy, lightheaded, feels as though she might pass out. Was unable to eat breakfast.    Pacemaker assessment today with normal function. Vital signs remarkable only for elevated blood pressure today in the setting of nausea. Lab work is unremarkable with normal electrolytes and creatinine. Mild anemia unchanged. She does report having had constipation for at least a week        Assessment:   1. Syncope/Asystole, SSS / CHB, resolved with pacemaker implantation    2. Hypertension: Labile blood pressures with noted blood pressures 755'M - 973'J systolic. When uncomfortable blood pressure is elevated the average over the last several days has been reasonable    3. COVID Positive: ID signed off. Still on oxygen per pulmonary no pneumonia on CT or chest x-ray    4. Pacemaker functioning well    5. Nausea/vomiting: To have medicine service reevaluate perhaps related to constipation postpone nursing home until tomorrow. 6.  I spoke with patient's daughter, Giana Hernandez. I reviewed that the patient has told me today she has no specific pain. Apparently has told the daughter that she had significant thigh pain. 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 We will try to get her up in a chair more today. I spoke with the daughter to say the nausea vomiting is concerning as well as a dizziness of the vital signs are stable.   Perhaps the main issue is that she is very uncomfortable because of the nausea perhaps related to constipation or perhaps related to COVID. We will have medicine service evaluate the patient I explained to the family that patient should not be sent to the nursing home today. Cardiac History/Testing  1. negative echocardiogram 1996 and November 2019  2. Negative carotid ultrasound for significant carotid artery disease, ICAs less than 50%, November 2019.  3. Abdominal aortic aneurysm, newly diagnosed, 4.5 cm, none by ultrasound 5/2021  4. Abnormal Lexiscan Myoview perfusion stress test for mild anterior lateral apical ischemia, November 2019  5. Negative cardiac catheterization for significant coronary artery disease 12/6/2019      HPI:   This is an admission history and physical due to the patient having some asystole. This was discussed with the primary hospitalist.  We will admit with their consult. This is a pleasant patient of Dr. Paolo Chamberlain who has a history of hypertension, obstructive sleep apnea, and there is in his problem list some idioventricular rhythm, although this has not been well delineated. She has a history of a heart catheterization, which showed no severe obstructive disease. She has no severe carotid disease. She has a questionable aneurysm, but her ultrasound showed no significant abdominal aortic aneurysm.     Most recently, the patient has felt ill over the last couple of weeks. She has also had some continuous nausea. She has also had a gabo syncopal spell this morning when she woke up on the floor. She has had several lightheaded and dizzy spells since that time and she continues to complain of nausea.     She was placed on the monitor and had definitely episodes of asystole,  which certainly could correlate with her symptoms. Physical Exam  Exam: She appears uncomfortable but denies particular pains. She does have mild nausea. She is not diaphoretic. Lungs are clear cardiac exam with a regular rhythm and rate.   Abdominal exam is soft nontender extremities show no edema      Allergies: No Known Allergies    Medications:  Reviewed  Home Medications    Infusion Medications:    sodium chloride      sodium chloride      sodium chloride      sodium chloride       Scheduled Medications:    sodium chloride flush  5-40 mL IntraVENous 2 times per day    enoxaparin  30 mg SubCUTAneous BID    metoprolol succinate  25 mg Oral Daily    sodium chloride flush  5-40 mL IntraVENous 2 times per day    sodium chloride flush  5-40 mL IntraVENous 2 times per day    losartan  100 mg Oral Daily    amLODIPine  5 mg Oral Daily    pantoprazole  40 mg Oral QAM AC    levothyroxine  112 mcg Oral Daily    sodium chloride flush  5-40 mL IntraVENous 2 times per day     PRN Meds: sodium chloride flush, sodium chloride, sodium chloride flush, sodium chloride, sodium chloride flush, sodium chloride, traMADol **OR** traMADol, ondansetron, acetaminophen, hydrALAZINE, sodium chloride flush, sodium chloride, ondansetron, morphine **OR** morphine    Vitals  Vitals:    01/15/22 0725   BP: (!) 167/60   Pulse: 63   Resp:    Temp:    SpO2: 96%       I&O  450 ml urine output     Telemetry:  Paced    Labs:   Recent Labs     01/13/22  0510 01/14/22  0658 01/15/22  0456   WBC 9.4 10.1 11.2*   HGB 10.9* 11.3* 11.1*   HCT 33.6* 34.9* 34.6*    271 269     Recent Labs     01/13/22  0510 01/14/22  0658   * 139   K 3.6 3.9   CL 99 102   CO2 25 26   BUN 11 11   CREATININE 0.34* 0.32*   CALCIUM 8.6 8.9     Recent Labs     01/13/22  0510 01/14/22  0658   AST 20 15   ALT 31 24   BILITOT 0.4 0.4   ALKPHOS 105 91     No results for input(s): INR in the last 72 hours. No results for input(s): Ady Killings in the last 72 hours.     Urinalysis:   Lab Results   Component Value Date    NITRU Negative 01/08/2022    WBCUA 0-2 12/15/2011    BACTERIA NOT SEEN 12/15/2011    RBCUA 50-99 12/15/2011    BLOODU Negative 01/08/2022    SPECGRAV 1.015 01/08/2022    GLUCOSEU Negative 01/08/2022 GLUCOSEU NEG 02/13/2012       Radiology:   Most recent    Chest CT      WITH CONTRAST:No results found for this or any previous visit. WITHOUT CONTRAST: Results for orders placed during the hospital encounter of 01/08/22    CT CHEST WO CONTRAST    Narrative  EXAMINATION: CT CHEST [address], 1/12/2022 3:21 PM    CLINICAL HISTORY:  hypoxia    COMPARISON: None    TECHNIQUE:  Multidetector CT was performed through the chest ,  All CT scans at this facility use dose modulation, iterative reconstruction, and/or weight based dosing when appropriate to reduce radiation dose to as low as reasonably achievable. 3-D, sagittal and coronal reconstructions were performed. FINDINGS  There is a left-sided cardiac pacemaker in place. The thyroid gland is within normal limits. The axillary regions demonstrate no significant lymphadenopathy or solid or cystic lesions. There is no mediastinal lymphadenopathy. The heart and pericardium are within normal limits. There is no pericardial effusion. The great vessels of the chest are normal in course and caliber. There is dependent atelectasis in posterior portions of the lung bases. There are no consolidations or infiltrates. There are trace bilateral pleural effusions. There are no acute bony abnormalities. There is a small hiatal hernia and there is a 5 cm cystic lesion in the left lower liver. Impression  There is atelectasis in the posterior dependent portions of the lung bases and there is trace pleural fluid. There is no infiltrate or consolidation. CXR      2-view: Results for orders placed during the hospital encounter of 01/08/22    XR CHEST (2 VW)    Narrative  Exam: XR CHEST (2 VW)    CLINICAL HISTORY:  Post PPM, possile dislodged atrial lead    COMPARISON: Chest x-ray from January 11, 2022    CHEST X-ray, 2 VIEWS    FINDINGS:    There is  a dual lead cardiac pacemaker in place  The cardiomediastinal silhouette is within normal limits.   Low inspiratory volumes are low with bronchovascular crowding. There are mild increased markings throughout both lungs. Bones of the thorax appear intact. Impression  No radiographic evidence of acute intrathoracic process. Portable: Results for orders placed during the hospital encounter of 01/08/22    XR CHEST PORTABLE    Narrative  EXAMINATION: XR CHEST PORTABLE    CLINICAL HISTORY: PACEMAKER PLACEMENT    COMPARISONS: JANUARY 9, 2022    FINDINGS: Pacemaker generator and wires unchanged. Osseous structures intact. Cardiopericardial silhouette normal. Lungs clear. Impression  NO ACUTE CARDIOPULMONARY DISEASE          Active Hospital Problems    Diagnosis Date Noted    Syncope and collapse [R55]      Priority: High    COVID-19 virus infection [U07.1]      Priority: Low    Sick sinus syndrome (HCC) [I49.5]      Priority: Low    Symptomatic bradycardia [R00.1]      Priority: Low       Additional work up or/and treatment plan may be added today or then after based on clinical progression. I am managing a portion of pt care. Some medical issues are handled by other specialists. Additional work up and treatment should be done in out pt setting by pt PCP and other out pt providers. In addition to examining and evaluating pt, I spent additional time explaining care, normaland abnormal findings, and treatment plan. All of pt questions were answered. Counseling, diet and education were provided. Case will be discussed with nursing staff when appropriate. Family will be updated if and whenappropriate.       Electronically signed by Diana Thorne MD on 1/15/2022 at 9:45 AM

## 2022-01-15 NOTE — DISCHARGE SUMMARY
[facility] Heart Discharge Note      Patient:  Nessa Nuñez  YOB: 1940  MRN: [id number]   Acct: [de-identified]  Admit Date:  1/8/2022   Discharge Date: 1/16/2022  Primary Cardiologist:Dr. Nancy Lee/Dr. Darylene Portal    [facility] [place] Cardiologist: Angeles Duran MD       Principal Problem:    Syncope and collapse  Active Problems:    COVID-19 virus infection    Sick sinus syndrome ([address]    Symptomatic bradycardia  Resolved Problems:    Asystole ([address]    Procedures  1.  1/8/2022: Temporary pacemaker, Dr. Annmarie Gong, complications: None  2.  1/11/2022: Permanent pacemaker, [employer]tronic Radha XT dual-chamber device, Dr. Christophe Hatch. Procedural complications: None. 3.  1/14/2022: Repositioning of atrial lead secondary to spontaneous dislodgment, Dr. Christophe Hatch complications none      Formal pacemaker interrogation 1/15/2022: Normal functioning atrial and ventricular leads normal functioning device    Consultations: Hospitalist service: Dr. Aviva Sousa                          Pulmonary: Dr. Rosy Kan                          Infectious Disease: Dr. Selene Downs                          Gastroenterology: Dr. Yunier Mcconnell Surgery: Dr. Bill Enamorado      Impression/Plan:     1. Asystole/syncope: Pacemaker functioning well. Patient had no complications from the procedure. Has follow-up arranged with cardiology  2. COVID infection: No evidence of pneumonia. Mild hypoxia. Will be going to the nursing home on oxygen therapy. Had tested positive COVID 1/8/2022. No pneumonia on chest x-ray  3. Hypertension: Fair control. Can continue to readjust medicines as outpatient, has follow-up cardiology 3 weeks electrophysiology 1 week  4. Lightheadedness: Occasionally she said if she moves her head she will get some vertigo and evidently this is chronic according to her daughter. It is mild at this time  5. Nausea/vomiting: Yesterday she had vomiting in the morning she has had none since.  She says she is much less nauseous today. Her daughter says this is intermittent problem at home. As she increases activity and has regular bowel movements this may improve I think it reasonable for her to go to the nursing home. He did have a small bowel movement only yesterday. 6. Family concerns early dementia: Her daughter Halima Harmon called to say she thought she should have a neuro evaluation/ CT scan. I reviewed with her that this can indeed be done as an outpatient and would be very still as Mrs. Elena Buitrago has shown some short term memory issues even here. She wondered if she could have a CT scan I reviewed with her that there was a CT scan of the head performed on January 8. She reported to me that she was aware but that she wanted it  by neurology as part of Alzheimer work up. I said that certainly is reasonable and can be done as outpatient. She will be arranging such follow-up. CT of the brain January 8 had shown age-related volume loss  7. Right medial malleolus fracture: Fractures noted along the lateral fourth and fifth metatarsal with minimal displacement. Walking boot ordered. Felt okay to weight-bear as tolerated. To have follow-up with orthopedics 2 to 3 weeks      Chief Complaint/Active Symptoms: Feels better today. No chest pain. Slight itching at the pacemaker site. She notes some swelling. No fever or chills. Does feel some slight dizziness if she turns her head a certain way. She says this is chronic. No abdominal pain. No further vomiting which she had had yesterday morning now is eating.  He is comfortable going to the nursing home and participating in rehab    Review of Systems:   Mild nausea improved from yesterday otherwise negative    CARDIAC HISTORY:  No history of coronary disease with no evidence of significant stenosis as of coronary angiography 2019  Hypertension    Past Medical History:   Diagnosis Date    Abdominal aortic aneurysm without rupture (HCC)     Age-related macular degeneration     Dehydration     Esophageal reflux     Falls frequently 01/02/2022    Hearing loss     High blood pressure     HIGH CHOLESTEROL     Hyperlipidemia     Hypothyroidism     Imbalance     Incontinence     Nausea     Obesity     Orthostatic dizziness     Osteoarthrosis     Overactive bladder     Thyroid disease        Objective:   Vitals:    01/15/22 1549 01/15/22 1630 01/15/22 1930 01/15/22 2005   BP: (!) 158/57 (!) 151/54  (!) 141/56   Pulse:  68 67 65   Resp: 18   18   Temp:  97.5 °F (36.4 °C)  97.5 °F (36.4 °C)   TempSrc:       SpO2: 96% 98%  93%   Weight:       Height:          Wt Readings from Last 3 Encounters:   01/12/22 206 lb 9.1 oz (93.7 kg)   09/22/21 203 lb (92.1 kg)   09/08/21 203 lb (92.1 kg)       TELEMETRY: paced                            Rhythm Strip: no svt/vt    Physical Exam:  General Appearance: alert and oriented to person, place and time, in no acute distress  Cardiovascular: normal rate, regular rhythm, normal S1 and S2, no murmurs, rubs, clicks, or gallops,  no JVD  Pulmonary/Chest: clear to auscultation bilaterally- no wheezes, rales or rhonchi, normal air movement, no respiratory distress  Abdomen: soft, non-tender, non-distended, normal bowel sounds, no masses   Extremities: no cyanosis, clubbing or edema  Skin: warm and dry, no rash or erythema  Eyes: EOMI  Neck: supple and non-tender without mass, no thyromegaly   Neurological: alert, oriented, normal speech, no focal findings or movement disorder noted  Pacemaker site: Slight swelling but no erythema. Minimally uncomfortable.     Allergies:  No Known Allergies     Medications:    sodium chloride flush  5-40 mL IntraVENous 2 times per day    enoxaparin  30 mg SubCUTAneous BID    metoprolol succinate  25 mg Oral Daily    sodium chloride flush  5-40 mL IntraVENous 2 times per day    sodium chloride flush  5-40 mL IntraVENous 2 times per day    losartan  100 mg Oral Daily    amLODIPine  5 mg Oral Daily    pantoprazole  40 mg Oral QAM AC  levothyroxine  112 mcg Oral Daily    sodium chloride flush  5-40 mL IntraVENous 2 times per day      sodium chloride      sodium chloride      sodium chloride      sodium chloride       sodium chloride flush, 5-40 mL, PRN  sodium chloride, 25 mL, PRN  sodium chloride flush, 5-40 mL, PRN  sodium chloride, 25 mL, PRN  sodium chloride flush, 5-40 mL, PRN  sodium chloride, 25 mL, PRN  traMADol, 50 mg, Q6H PRN   Or  traMADol, 100 mg, Q6H PRN  ondansetron, 4 mg, Q6H PRN  acetaminophen, 1,000 mg, Q6H PRN  hydrALAZINE, 10 mg, Q4H PRN  sodium chloride flush, 5-40 mL, PRN  sodium chloride, 25 mL, PRN  ondansetron, 4 mg, Q6H PRN  morphine, 2 mg, Q2H PRN   Or  morphine, 4 mg, Q2H PRN        Diagnostics:  EKG:  Pacing on tele. CXR: 2-view: Results for orders placed during the hospital encounter of 01/08/22    XR CHEST (2 VW)    Narrative  XR CHEST (2 VW)    Clinical History:  Dehydration. Post pacemaker. Comparison:  1/12/2022. RESULT:    Left transvenous pacemaker with leads in the approximate regions of the right atrium and right ventricle. No consolidation. No large pleural effusion. No pneumothorax. Linear scarring/atelectasis at the left lung base, unchanged. Stable borderline enlarged cardiac mediastinal silhouette. Aortic vascular calcifications. Degenerative changes within the spine. Impression  No acute radiographic abnormality. XR CHEST (2 VW)    Narrative  Exam: XR CHEST (2 VW)    CLINICAL HISTORY:  Post PPM, possile dislodged atrial lead    COMPARISON: Chest x-ray from January 11, 2022    CHEST X-ray, 2 VIEWS    FINDINGS:    There is  a dual lead cardiac pacemaker in place  The cardiomediastinal silhouette is within normal limits. Low inspiratory volumes are low with bronchovascular crowding. There are mild increased markings throughout both lungs. Bones of the thorax appear intact. Impression  No radiographic evidence of acute intrathoracic process.               Portable: Results for orders placed during the hospital encounter of 01/08/22    XR CHEST PORTABLE    Narrative  EXAMINATION: XR CHEST PORTABLE    CLINICAL HISTORY: PACEMAKER PLACEMENT    COMPARISONS: JANUARY 9, 2022    FINDINGS: Pacemaker generator and wires unchanged. Osseous structures intact. Cardiopericardial silhouette normal. Lungs clear. Impression  NO ACUTE CARDIOPULMONARY DISEASE      Lab Data:    Cardiac Enzymes:  No results for input(s): CKTOTAL, CKMB, CKMBINDEX, TROPONINI in the last 72 hours.   ProBNP:   Lab Results   Component Value Date    PROBNP 459 01/08/2022       CBC:   Recent Labs     01/14/22  0658 01/15/22  0456 01/16/22  0640   WBC 10.1 11.2* 10.9*   RBC 4.26 4.18* 4.39   HGB 11.3* 11.1* 11.7*   HCT 34.9* 34.6* 35.7*    269 316       CMP:    Recent Labs     01/14/22  0658      K 3.9      CO2 26   BUN 11   CREATININE 0.32*   GFRAA >60.0   LABGLOM >60.0   GLUCOSE 93   CALCIUM 8.9       Hepatic Function Panel:    Recent Labs     01/14/22  0658   ALKPHOS 91   ALT 24   AST 15   PROT 5.7*   BILITOT 0.4   LABALBU 2.5*       Magnesium:    Recent Labs     01/13/22  0510   MG 1.8        Electronically signed by Meredith Summers MD on 1/15/2022 at 9:08 AM

## 2022-01-15 NOTE — PROGRESS NOTES
Physical Therapy Med Surg Daily Treatment Note  Facility/Department: [facility]  Room: H5/A258-02       NAME: Vernell Leon  : 1940 (78 y.o.)  MRN: [id number]  CODE STATUS: Full Code    Date of Service: 1/15/2022    Patient Diagnosis(es): Dehydration [E86.0]  Sick sinus syndrome ([address] [I49.5]  Symptomatic bradycardia [R00.1]  Closed fracture of right ankle, initial encounter [S82.891A]  COVID-19 virus infection [U07.1]  Syncope and collapse [R55]   Chief Complaint   Patient presents with    Dizziness    Emesis     Patient Active Problem List    Diagnosis Date Noted    Dehydration     COVID-19 virus infection     Sick sinus syndrome ([address]     Symptomatic bradycardia     Syncope and collapse     Cellulitis 2021    Edema of both legs 2021    Urge incontinence of urine     Fecal smearing     Abdominal aortic aneurysm, without rupture ([address] 2021    Decreased ROM of neck 2020    Orthostatic dizziness 2019    Imbalance 2019    Immune thrombocytopenic purpura ([address] 2019    Thrombocytopenia, unspecified ([address] 2019    Essential (primary) hypertension 2019    Diarrhea 2018    Abnormal auditory perception of both ears 2018    Tinnitus, bilateral 2018    Other specified soft tissue disorders 09/15/2017    PVD (posterior vitreous detachment), both eyes 2017    Ptosis of both eyelids 2017    Injury of tendon of rotator cuff 2017    Age-related macular degeneration, dry, both eyes 2016    Myogenic ptosis of eyelid of both eyes 2016    Open angle with borderline findings, low risk, bilateral 2016    Vitreous degeneration 2015    Nausea alone 2015    Sensorineural hearing loss of both ears 2013    Hypothyroid 2013    Mixed hyperlipidemia 2013    Benign neoplasm of colon 10/30/2003    Esophageal reflux 10/22/2002    Generalized osteoarthrosis, unspecified site 10/22/2002    Overweight and obesity 10/22/2002        Past Medical History:   Diagnosis Date    Abdominal aortic aneurysm without rupture (HCC)     Age-related macular degeneration     Dehydration     Esophageal reflux     Falls frequently 01/02/2022    Hearing loss     High blood pressure     HIGH CHOLESTEROL     Hyperlipidemia     Hypothyroidism     Imbalance     Incontinence     Nausea     Obesity     Orthostatic dizziness     Osteoarthrosis     Overactive bladder     Thyroid disease      Past Surgical History:   Procedure Laterality Date    CATARACT REMOVAL      CHOLECYSTECTOMY      CHOLECYSTECTOMY      EYE SURGERY Bilateral     cataract    HYSTERECTOMY      IR NEUROSTIMULATOR PLACEMENT  07/28/2021    NERVE SURGERY N/A 7/28/2021    STAGE 1 & STAGE 2 INTERSTIM performed by Nghia Hernandez MD at [address] [place] [address]  Restrictions/Precautions: Weight Bearing; Fall Risk  Lower Extremity Weight Bearing Restrictions  Right Lower Extremity Weight Bearing: Weight Bearing As Tolerated (in walking boot)  Position Activity Restriction  Other position/activity restrictions: New pacemaker placed 1/11/22    SUBJECTIVE   General  Chart Reviewed: Yes  Family / Caregiver Present: No  Subjective  Subjective: \"I feel nauseous\"  General Comment  Comments: pt needing max encouragement to participate. Pre-Session Pain Report  Pre Treatment Pain Screening  Pain at present: 4  Scale Used: Numeric Score  Intervention List: Patient able to continue with treatment  Pain Screening  Patient Currently in Pain: Yes       Post-Session Pain Report  Pain Assessment  Pain Assessment: 0-10  Pain Level: 4  Pain Type: Acute pain  Pain Location: Ankle  Pain Orientation: Right         OBJECTIVE        Bed mobility  Supine to Sit: Maximum assistance  Sit to Supine: Maximum assistance  Comment: Increased cues for initiation and technqiue, assist to lift trunk upright and to get BLE into bed.  increased time and effort to complete. Transfers  Sit to Stand: Maximum Assistance;2 Person Assistance  Stand to sit: Maximum Assistance  Comment: Pt requires +2 assist to complete STS assistance needed for weight shifting and advancing BLE to side step towards HOB increased cues for sequencing weight shifting and utilizing UE's on [employer]. increased time and effort to complete. Activity Tolerance  Activity Tolerance: Patient limited by fatigue;Patient limited by pain          ASSESSMENT   Assessment: increased time and effort for all tasks, pt fixated on nausea and is self limiting at times, significant difficulty advancing BLE using [employer]. Discharge Recommendations:  Continue to assess pending progress,Patient would benefit from continued therapy after discharge    Goals  Long term goals  Long term goal 1: Pt to complete bed mobility with CGA  Long term goal 2: Pt to complete transfers with Poly  Long term goal 3: Pt to ambualte 10ft with LRD and Poly    PLAN    Times per week: 5-7  Times per day:  (QB/1-2)  Safety Devices  Type of devices: All fall risk precautions in place,Call light within reach,Bed alarm in place,Left in bed,Nurse notified     [facility] (6 CLICK) [address] Inpatient Mobility Raw Score : 9      Therapy Time   Individual   Time In 1405   Time Out 1428   Minutes 23      BM/Trsf: [address] [place], PTA, 01/15/22 at 2:54 PM         Definitions for assistance levels  Independent = pt does not require any physical supervision or assistance from another person for activity completion. Device may be needed.   Stand by assistance = pt requires verbal cues or instructions from another person, close to but not touching, to perform the activity  Minimal assistance= pt performs 75% or more of the activity; assistance is required to complete the activity  Moderate assistance= pt performs 50% of the activity; assistance is required to complete the activity  Maximal assistance = pt performs 25% of the activity; assistance is required to complete the activity  Dependent = pt requires total physical assistance to accomplish the task

## 2022-01-15 NOTE — OP NOTE
[address]                      [address], [address] [facility]                                OPERATIVE REPORT    PATIENT NAME: Sg Wild                 :        1940  MED REC NO:   [id number]                            ROOM:       W163  ACCOUNT NO:   [de-identified]                           ADMIT DATE: 2022  PROVIDER:     Tanner Mesa MD    DATE OF PROCEDURE:  2022    This is a procedure that was performed in the electrophysiology  laboratory on 2022. OPERATIVE PERFORMED:  1. Fluoroscopy. 2.  Pre and post dual-chamber pacemaker interrogation and reprogramming. 3.  Pocket opening. 4.  Right atrial lead repositioning. 5.  Removal of dual-chamber pacemaker. 6.  Implantation of the dual-chamber pacemaker. 7.  Device testing. HISTORY:  Please refer to the detailed history and physical in the  patient's medical chart. History of syncope associated with significant  pauses on telemetry up to 15 seconds of duration. Status post  dual-chamber pacemaker with right atrial lead displacement. The patient  is scheduled for repositioning of the right atrial lead. PROCEDURE NARRATIVE:  The device was interrogated and reprogrammed prior  to the procedure. The procedure, risks, benefits, and possible  complications were explained to the patient and informed consent was  obtained. The patient was in a fasting state. A grounding pad was  placed. Self-adhesive anterior and posterior defibrillation pads were  applied. A defibrillator waveform was set to biphasic. The patient was  set up for continuous monitoring of 12-lead EKG and pulse oximetry. The  blood pressure was monitored. The procedure was performed under IV  conscious sedation. The upper chest was prepped and draped in the usual  sterile fashion.   After preoperative IV antibiotic was completely  infused, subcutaneous tissues just medial to the left deltopectoral area  were infiltrated with lidocaine 1% for local anesthesia. The device  pocket was opened. Sutures were removed from the subcutaneous area. The device was removed from the device pocket and the leads were  detached from the device. The device was placed on the table for  reimplantation at the end of the procedure. Stylets were placed through  the atrial and ventricular lead. Sutures were removed from the leads. Pursestring suture also was removed. The set screw of the right atrial  lead was retracted. These were seen under fluoroscopy guidance. The  right atrial lead was moved towards the right atrial appendage for  implantation. Initially, the lead was in the tricuspid valve. Once the  lead was attached to the right atrial appendage, the set screw was  activated. Appropriate sensing and impedances were obtained. No  diaphragmatic pacing occurred at 10 V at 1.5 msec. The lead was sutured  in place in the pectoralis muscle using 0 Ethibond suture. Another  pursestring was placed over the leads. Lead measurements were  reevaluated. The left prepectoral pocket was flushed with solution of  saline and vancomycin. Wound hemostasis was obtained with  electrocautery. The dual-chamber pacemaker ([employer] [place] XTDR MRI  model number [id number], serial number [id number]) was reattached to the  leads and implanted. The device was interrogated again and parameters  recorded. The pocket was closed in three layers. The skin was  approximated with subcuticular suture and Steri-Strips. A pressure  dressing were applied. The patient left the EP laboratory in stable  condition. SUMMARY:  1. Successful repositioning of the right atrial lead. 2.  Successful reimplantation of dual-chamber pacemaker. FOLLOWUP:  1. The patient will remain in hospital overnight for telemetry  monitoring and observation with anticipated discharge from the hospital  from the electrophysiology standpoint in the next 24 hours.   2. The patient will receive an extra dose of vancomycin 1 gm 24 hours  post procedure. 3.  The patient also will get a chest x-ray PA and lateral 24 hours post  procedure. 4.  Followup with the [address] [place] [address] Drive as scheduled. 5.  Followup in my office in seven days for wound assessment.         Rose Santacruz MD    D: 01/14/2022 15:07:28       T: 01/14/2022 23:50:36     [email or web address]  Job#: [id number]     Doc#: [id number]    CC:

## 2022-01-15 NOTE — PROCEDURES
Section of Cardiology  Adult Brief Pacemaker Procedure Note        Procedure(s): Temporary pacemaker placement    Pre-operative Diagnosis: Significant sinus pauses    Post-operative Diagnosis:  Successful temporary pacemaker placement     Procedure:  After discussing purpose risk benefits and alternatives of the procedure with the patient patient agreed to proceed. Patient was brought to the cardiac catheterization laboratory in a fasting state  Patient was draped in the usual sterile fashion  The right groin was locally anesthetized with lidocaine solution  Using Seldinger technique and through a micropuncture needle access was obtained into the right femoral vein. Next a 6 Kazakh 12 cm sheath was inserted into the right femoral vein through a guidewire. Next a temporary pacemaker wire was advanced through the 6 Kazakh femoral sheath into the right ventricle. After confirming successful capture, temporary pacemaker was set at backup rate of 60 output of 5 A  The 6 Kazakh sheath as well as the temporary pacemaker were secured in place on the right groin using a single suture and Tegaderms.     Complications:  None    EBL: 2 cc    Recommendations:  Transfer patient to ICU for hemodynamic monitoring  Continue supportive care and management of sinus pauses as per EP service      Primary Proceduralist:   Akash Gutierrez MD

## 2022-01-15 NOTE — PROGRESS NOTES
Physical Therapy Missed Treatment   Facility/Department: [facility] MED SURG Q020/Y819-93    NAME: Júnior Puga    : 1940 (78 y.o.)  MRN: [id number]    Account: [de-identified]  Gender: female    Chart reviewed, attempted PT at 200. Patient unavailable 2° to:    [] Hold per nsg request    [x] Pt declined d/t nausea/vomitting this AM. Agreeable for us to return later in day if possible. [] Pt. . off floor for test/procedure. [] Pt. Unavailable       Will attempt PT treatment again at earliest convenience.       Electronically signed by Piper Velázquez PTA on 1/15/22 at 10:01 AM EST

## 2022-01-16 ENCOUNTER — APPOINTMENT (OUTPATIENT)
Dept: GENERAL RADIOLOGY | Age: 82
DRG: 242 | End: 2022-01-16
Payer: MEDICARE

## 2022-01-16 VITALS
HEART RATE: 64 BPM | RESPIRATION RATE: 18 BRPM | DIASTOLIC BLOOD PRESSURE: 61 MMHG | TEMPERATURE: 98.2 F | BODY MASS INDEX: 34.42 KG/M2 | WEIGHT: 206.57 LBS | OXYGEN SATURATION: 95 % | SYSTOLIC BLOOD PRESSURE: 126 MMHG | HEIGHT: 65 IN

## 2022-01-16 PROBLEM — I44.30 HEART BLOCK, AV: Status: ACTIVE | Noted: 2022-01-16

## 2022-01-16 LAB
BASOPHILS ABSOLUTE: 0.1 K/UL (ref 0–0.2)
BASOPHILS RELATIVE PERCENT: 1 %
BLOOD CULTURE, ROUTINE: NORMAL
CULTURE, BLOOD 2: NORMAL
EOSINOPHILS ABSOLUTE: 0.3 K/UL (ref 0–0.7)
EOSINOPHILS RELATIVE PERCENT: 2.4 %
HCT VFR BLD CALC: 35.7 % (ref 37–47)
HEMOGLOBIN: 11.7 G/DL (ref 12–16)
LYMPHOCYTES ABSOLUTE: 1.3 K/UL (ref 1–4.8)
LYMPHOCYTES RELATIVE PERCENT: 12.1 %
MCH RBC QN AUTO: 26.8 PG (ref 27–31.3)
MCHC RBC AUTO-ENTMCNC: 32.9 % (ref 33–37)
MCV RBC AUTO: 81.3 FL (ref 82–100)
MONOCYTES ABSOLUTE: 1 K/UL (ref 0.2–0.8)
MONOCYTES RELATIVE PERCENT: 9.2 %
NEUTROPHILS ABSOLUTE: 8.2 K/UL (ref 1.4–6.5)
NEUTROPHILS RELATIVE PERCENT: 75.3 %
PDW BLD-RTO: 14 % (ref 11.5–14.5)
PLATELET # BLD: 316 K/UL (ref 130–400)
RBC # BLD: 4.39 M/UL (ref 4.2–5.4)
WBC # BLD: 10.9 K/UL (ref 4.8–10.8)

## 2022-01-16 PROCEDURE — 2580000003 HC RX 258: Performed by: INTERNAL MEDICINE

## 2022-01-16 PROCEDURE — 85025 COMPLETE CBC W/AUTO DIFF WBC: CPT

## 2022-01-16 PROCEDURE — 6370000000 HC RX 637 (ALT 250 FOR IP): Performed by: INTERNAL MEDICINE

## 2022-01-16 PROCEDURE — 2580000003 HC RX 258: Performed by: NURSE PRACTITIONER

## 2022-01-16 PROCEDURE — 36415 COLL VENOUS BLD VENIPUNCTURE: CPT

## 2022-01-16 PROCEDURE — 71045 X-RAY EXAM CHEST 1 VIEW: CPT

## 2022-01-16 PROCEDURE — 99232 SBSQ HOSP IP/OBS MODERATE 35: CPT | Performed by: INTERNAL MEDICINE

## 2022-01-16 PROCEDURE — 97535 SELF CARE MNGMENT TRAINING: CPT

## 2022-01-16 PROCEDURE — 6360000002 HC RX W HCPCS: Performed by: INTERNAL MEDICINE

## 2022-01-16 RX ORDER — SENNA PLUS 8.6 MG/1
1 TABLET ORAL NIGHTLY
Qty: 30 TABLET | Refills: 0 | Status: SHIPPED | OUTPATIENT
Start: 2022-01-16 | End: 2022-02-15

## 2022-01-16 RX ORDER — SODIUM CHLORIDE 0.9 % (FLUSH) 0.9 %
5-40 SYRINGE (ML) INJECTION EVERY 12 HOURS SCHEDULED
Status: DISCONTINUED | OUTPATIENT
Start: 2022-01-16 | End: 2022-01-16 | Stop reason: HOSPADM

## 2022-01-16 RX ORDER — METOPROLOL SUCCINATE 25 MG/1
25 TABLET, EXTENDED RELEASE ORAL DAILY
Qty: 30 TABLET | Refills: 3 | Status: SHIPPED | OUTPATIENT
Start: 2022-01-16

## 2022-01-16 RX ORDER — SODIUM CHLORIDE 9 MG/ML
25 INJECTION, SOLUTION INTRAVENOUS PRN
Status: DISCONTINUED | OUTPATIENT
Start: 2022-01-16 | End: 2022-01-16 | Stop reason: HOSPADM

## 2022-01-16 RX ORDER — LACTULOSE 10 G/15ML
30 SOLUTION ORAL ONCE
Status: COMPLETED | OUTPATIENT
Start: 2022-01-16 | End: 2022-01-16

## 2022-01-16 RX ORDER — TRAMADOL HYDROCHLORIDE 50 MG/1
100 TABLET ORAL EVERY 6 HOURS PRN
Status: DISCONTINUED | OUTPATIENT
Start: 2022-01-16 | End: 2022-01-16 | Stop reason: HOSPADM

## 2022-01-16 RX ORDER — SODIUM CHLORIDE 0.9 % (FLUSH) 0.9 %
5-40 SYRINGE (ML) INJECTION PRN
Status: DISCONTINUED | OUTPATIENT
Start: 2022-01-16 | End: 2022-01-16 | Stop reason: HOSPADM

## 2022-01-16 RX ORDER — PANTOPRAZOLE SODIUM 40 MG/1
40 TABLET, DELAYED RELEASE ORAL
Qty: 30 TABLET | Refills: 3 | Status: SHIPPED | OUTPATIENT
Start: 2022-01-16 | End: 2022-05-02

## 2022-01-16 RX ORDER — LACTULOSE 10 G/15ML
20 SOLUTION ORAL 2 TIMES DAILY PRN
Refills: 1 | DISCHARGE
Start: 2022-01-16 | End: 2022-05-02

## 2022-01-16 RX ORDER — TRAMADOL HYDROCHLORIDE 50 MG/1
50 TABLET ORAL EVERY 6 HOURS PRN
Status: DISCONTINUED | OUTPATIENT
Start: 2022-01-16 | End: 2022-01-16 | Stop reason: HOSPADM

## 2022-01-16 RX ADMIN — Medication 10 ML: at 00:00

## 2022-01-16 RX ADMIN — POLYETHYLENE GLYCOL 3350 17 G: 17 POWDER, FOR SOLUTION ORAL at 10:48

## 2022-01-16 RX ADMIN — LOSARTAN POTASSIUM 100 MG: 100 TABLET, FILM COATED ORAL at 10:48

## 2022-01-16 RX ADMIN — LACTULOSE 30 G: 20 SOLUTION ORAL at 10:47

## 2022-01-16 RX ADMIN — SODIUM CHLORIDE, PRESERVATIVE FREE 10 ML: 5 INJECTION INTRAVENOUS at 10:50

## 2022-01-16 RX ADMIN — ONDANSETRON 4 MG: 2 INJECTION INTRAMUSCULAR; INTRAVENOUS at 10:46

## 2022-01-16 RX ADMIN — AMLODIPINE BESYLATE 5 MG: 5 TABLET ORAL at 10:48

## 2022-01-16 RX ADMIN — LEVOTHYROXINE SODIUM 112 MCG: 112 TABLET ORAL at 10:48

## 2022-01-16 RX ADMIN — METOPROLOL SUCCINATE 25 MG: 25 TABLET, FILM COATED, EXTENDED RELEASE ORAL at 10:48

## 2022-01-16 RX ADMIN — PANTOPRAZOLE SODIUM 40 MG: 40 TABLET, DELAYED RELEASE ORAL at 10:48

## 2022-01-16 RX ADMIN — Medication 10 ML: at 01:48

## 2022-01-16 ASSESSMENT — PAIN SCALES - GENERAL
PAINLEVEL_OUTOF10: 0
PAINLEVEL_OUTOF10: 0

## 2022-01-16 NOTE — PROGRESS NOTES
Physical Therapy Med Surg Daily Treatment Note  Facility/Department: [facility] TELEMETRY  [id number]       NAME: Darron Schmid  : 1940 (78 y.o.)  MRN: [id number]  CODE STATUS: Full Code    Date of Service: 2022    Patient Diagnosis(es): Dehydration [E86.0]  Sick sinus syndrome ([address] [I49.5]  Symptomatic bradycardia [R00.1]  Closed fracture of right ankle, initial encounter [S82.891A]  COVID-19 virus infection [U07.1]  Syncope and collapse [R55]   Chief Complaint   Patient presents with    Dizziness    Emesis     Patient Active Problem List    Diagnosis Date Noted    Dehydration     COVID-19 virus infection     Sick sinus syndrome ([address]     Symptomatic bradycardia     Syncope and collapse     Cellulitis 2021    Edema of both legs 2021    Urge incontinence of urine     Fecal smearing     Abdominal aortic aneurysm, without rupture ([address] 2021    Decreased ROM of neck 2020    Orthostatic dizziness 2019    Imbalance 2019    Immune thrombocytopenic purpura ([address] 2019    Thrombocytopenia, unspecified ([address] 2019    Essential (primary) hypertension 2019    Diarrhea 2018    Abnormal auditory perception of both ears 2018    Tinnitus, bilateral 2018    Other specified soft tissue disorders 09/15/2017    PVD (posterior vitreous detachment), both eyes 2017    Ptosis of both eyelids 2017    Injury of tendon of rotator cuff 2017    Age-related macular degeneration, dry, both eyes 2016    Myogenic ptosis of eyelid of both eyes 2016    Open angle with borderline findings, low risk, bilateral 2016    Vitreous degeneration 2015    Nausea alone 2015    Sensorineural hearing loss of both ears 2013    Hypothyroid 2013    Mixed hyperlipidemia 2013    Benign neoplasm of colon 10/30/2003    Esophageal reflux 10/22/2002    Generalized osteoarthrosis, unspecified site 10/22/2002    Overweight and obesity 10/22/2002        Past Medical History:   Diagnosis Date    Abdominal aortic aneurysm without rupture (HCC)     Age-related macular degeneration     Dehydration     Esophageal reflux     Falls frequently 01/02/2022    Hearing loss     High blood pressure     HIGH CHOLESTEROL     Hyperlipidemia     Hypothyroidism     Imbalance     Incontinence     Nausea     Obesity     Orthostatic dizziness     Osteoarthrosis     Overactive bladder     Thyroid disease      Past Surgical History:   Procedure Laterality Date    CATARACT REMOVAL      CHOLECYSTECTOMY      CHOLECYSTECTOMY      EYE SURGERY Bilateral     cataract    HYSTERECTOMY      IR NEUROSTIMULATOR PLACEMENT  07/28/2021    NERVE SURGERY N/A 7/28/2021    STAGE 1 & STAGE 2 INTERSTIM performed by Mukul Clark MD at [address] [place] [address]  Restrictions/Precautions: Weight Bearing; Fall Risk  Lower Extremity Weight Bearing Restrictions  Right Lower Extremity Weight Bearing: Weight Bearing As Tolerated (in walking boot)  Position Activity Restriction  Other position/activity restrictions: New pacemaker placed 1/11/22    SUBJECTIVE   Subjective  Subjective: Pt in bed upon arrival. Agreeable to therapy. Denies pain at rest, though states feeling very itchy. Pre-Session Pain Report  Pre Treatment Pain Screening  Pain at present: 0  Scale Used: Numeric Score  Intervention List: Patient able to continue with treatment  Pain Screening  Patient Currently in Pain: Denies     Post-Session Pain Report  Pain Assessment  Pain Assessment: 0-10  Pain Level: 0     OBJECTIVE   Orientation  Overall Orientation Status: Within Functional Limits    Bed mobility  Supine to Sit: Maximum assistance  Comment: HOB elevated with cues for use of rail and for avoiding use of LUE d/t post pacemaker precautions    Transfers  Sit to Stand:  Moderate Assistance  Stand to sit: Maximum Assistance  Bed to Chair: Maximum assistance;2 Person Assistance  Comment: MOD to stand with RUE support on [employer]. Step by step cues to move feet to advance to chair. Pt completed 75% of tranfer before stating she was going to lose her balance. Therapist and aide guided pt safely to chair. ASSESSMENT   Assessment: Increased time and effort to complete bed mobility. Able to transfer to chair this date with max cues for safety awareness. Discharge Recommendations:  Continue to assess pending progress,Patient would benefit from continued therapy after discharge    Goals  Long term goals  Long term goal 1: Pt to complete bed mobility with CGA  Long term goal 2: Pt to complete transfers with Poly  Long term goal 3: Pt to ambualte 10ft with LRD and Poly    PLAN    Times per week: 5-7  Times per day:  (QB/1-2)  Safety Devices  Type of devices: All fall risk precautions in place,Chair alarm in place,Call light within reach     [facility] (6 CLICK) BASIC MOBILITY  AM-PAC Inpatient Mobility Raw Score : 9     Therapy Time   Individual   Time In 0752   Time Out 0815   Minutes 23         Activity Minutes Units   Transfers/BM 23 2          Anaid Varela PTA, 01/16/22 at 8:20 AM         Definitions for assistance levels  Independent = pt does not require any physical supervision or assistance from another person for activity completion. Device may be needed.   Stand by assistance = pt requires verbal cues or instructions from another person, close to but not touching, to perform the activity  Minimal assistance= pt performs 75% or more of the activity; assistance is required to complete the activity  Moderate assistance= pt performs 50% of the activity; assistance is required to complete the activity  Maximal assistance = pt performs 25% of the activity; assistance is required to complete the activity  Dependent = pt requires total physical assistance to accomplish the task

## 2022-01-16 NOTE — PROGRESS NOTES
1310: Dr. Tree Pablo aware that pt will need anticoagulant. Pt resting in bed,  time is 1400.    1340: Report called to [facility] [place].

## 2022-01-16 NOTE — PROGRESS NOTES
Physical Therapy Missed Treatment   Facility/Department: [facility] MED SURG X497/V201-47    NAME: Alex Trejo    : 1940 (78 y.o.)  MRN: [id number]    Account: [de-identified]  Gender: female    Chart reviewed, attempted PT at 1100. Patient unavailable 2° to:      [x] Pt declined - Pt just returned to bed by NSG. NSG states pt to DC this afternoon.    [x] Nsg notified   [] Other notified        Electronically signed by Zena Verma PTA on 22 at 11:04 AM EST

## 2022-01-16 NOTE — CARE COORDINATION
SPOKE WITH DTR AND PT ABOUT DISCHARGE TODAY TO [facility]. [facility] SET UP FOR 2PM. ATTEMPTED TO CONTACT SUPERVISOR WITH MESSAGE LEFT. BEDSIDE NURSE IS AWARE.  DR. Huong Garcia IS HERE TO SEE PT. DR. Carol Witt SAW PT THIS AM.

## 2022-01-16 NOTE — PROGRESS NOTES
Hospitalist Progress Note      Date of Admission: 1/8/2022  Chief Complaint:    Chief Complaint   Patient presents with    Dizziness    Emesis     Subjective:  Nausea has improved. No BM yet      Medications:    Infusion Medications    sodium chloride      sodium chloride      sodium chloride      sodium chloride      sodium chloride       Scheduled Medications    sodium chloride flush  5-40 mL IntraVENous 2 times per day    enoxaparin  40 mg SubCUTAneous Daily    polyethylene glycol  17 g Oral Daily    senna  1 tablet Oral Nightly    metoprolol succinate  25 mg Oral Daily    losartan  100 mg Oral Daily    amLODIPine  5 mg Oral Daily    pantoprazole  40 mg Oral QAM AC    levothyroxine  112 mcg Oral Daily     PRN Meds: sodium chloride flush, sodium chloride, traMADol **OR** traMADol, sodium chloride, sodium chloride, sodium chloride, ondansetron, acetaminophen, hydrALAZINE, sodium chloride flush, sodium chloride, ondansetron, morphine **OR** morphine    Intake/Output Summary (Last 24 hours) at 1/16/2022 1445  Last data filed at 1/16/2022 0559  Gross per 24 hour   Intake 510 ml   Output 1450 ml   Net -940 ml     Exam:  /61   Pulse 64   Temp 98.2 °F (36.8 °C) (Oral)   Resp 18   Ht 5' 5\" (1.651 m)   Wt 206 lb 9.1 oz (93.7 kg)   SpO2 95%   BMI 34.38 kg/m²   Head: Normocephalic, atraumatic  Sclera clear  Neck JVD flat  Lungs: normal effort of breathing    Labs:   Recent Labs     01/14/22  0658 01/15/22  0456 01/16/22  0640   WBC 10.1 11.2* 10.9*   HGB 11.3* 11.1* 11.7*   HCT 34.9* 34.6* 35.7*    269 316     Recent Labs     01/14/22  0658      K 3.9      CO2 26   BUN 11   CREATININE 0.32*   CALCIUM 8.9   AST 15   ALT 24   BILITOT 0.4   ALKPHOS 91     No results for input(s): INR in the last 72 hours. No results for input(s): Debi Councilman in the last 72 hours. Radiology:  XR CHEST PORTABLE   Final Result   RIGHT LOWER LUNG SUBSEGMENTAL ATELECTASIS.       XR CHEST (2 VW)   Final Result      No acute radiographic abnormality. CTA CHEST W WO CONTRAST   Final Result      No CT evidence of pulmonary embolism. Stable right greater than left small bilateral pleural effusions with adjacent atelectasis. CT CHEST WO CONTRAST   Final Result    There is atelectasis in the posterior dependent portions of the lung bases and there is trace pleural fluid. There is no infiltrate or consolidation. XR CHEST (2 VW)   Final Result   No radiographic evidence of acute intrathoracic process. XR CHEST PORTABLE   Final Result   NO ACUTE CARDIOPULMONARY DISEASE      XR FOOT RIGHT (MIN 3 VIEWS)   Final Result      ACUTE FRACTURES OF THE SECOND THROUGH FOURTH (AND PROBABLY FIFTH) METATARSALS, AS NOTED. XR CHEST PORTABLE   Final Result      INFERIOR VENA CAVA APPROACH PACER LEADS IN EXPECTED POSITION. POOR INSPIRATION WITH MILD LEFT BASILAR INFILTRATE/ATELECTASIS. BRONCHOPNEUMONIA SHOULD BE EXCLUDED CLINICALLY. XR ANKLE RIGHT (MIN 3 VIEWS)   Final Result      INCOMPLETELY VISUALIZED METATARSAL FRACTURES, AS NOTED. DEDICATED RIGHT FOOT RADIOGRAPHS ARE SUGGESTED. NONDISPLACED MEDIAL MALLEOLAR FRACTURE. The findings were discussed with the patient's ICU nurse at approximately 7:28 AM on 1/9/2022. CT HEAD WO CONTRAST   Final Result      NO ACUTE INTRACRANIAL PROCESS OR SIGNIFICANT CHANGE FROM PRIOR STUDIES IDENTIFIED. XR CHEST PORTABLE   Final Result   NO ACUTE CARDIOPULMONARY DISEASE. Assessment/Plan:    SSS with sinus pause: S/p dual-chamber pacemaker on 1/11 and RA lead repositioning on 1/14    Covid positive without hypoxia. Vaccinated but not boosted. Does not require further tx at this time. Recommend isolation through 1/18. R ankle and metatarsal fx: management per Ortho.  Conservative tx    Nausea and vomiting related to constipation: Give additional bowel regimen today with enema. Okay to continue lactulose as needed at skilled nursing facility.     Okay for discharge to SNF    >35 minutes total care time, >1/2 in unit/floor time and care coordination     Jeff Asencio DO

## 2022-01-16 NOTE — PROGRESS NOTES
INPATIENT PROGRESS NOTES    PATIENT NAME: Elías Garland  MRN: [id number]  SERVICE DATE:  January 16, 2022   SERVICE TIME:  11:34 AM      PRIMARY SERVICE: Pulmonary Disease    CHIEF COMPLAINTS: COVID-19 infection    INTERVAL HPI: Patient seen and examined at bedside, Interval Notes, orders reviewed. Nursing notes noted    Doing good, has no complaint, denies chest pain, no coughing, shortness of breath is minimal, more interactive and awake today, no nausea no vomiting, appetite is good, she is on 2 L O2 saturation 95%, no fever  Review of system:     GI Abdominal pain No  Skin Rash No    Social History     Tobacco Use    Smoking status: Never Smoker    Smokeless tobacco: Never Used   Substance Use Topics    Alcohol use: Yes     Comment: occasionally     History reviewed. No pertinent family history. OBJECTIVE    Body mass index is 34.38 kg/m². PHYSICAL EXAM:  Vitals:  /61   Pulse 64   Temp 98.2 °F (36.8 °C) (Oral)   Resp 18   Ht 5' 5\" (1.651 m)   Wt 206 lb 9.1 oz (93.7 kg)   SpO2 95%   BMI 34.38 kg/m²     General: alert, cooperative, no distress  Head: normocephalic, atraumatic  Eyes:No gross abnormalities. ENT:  MMM no lesions  Neck:  supple and no masses  Chest : Good air movement, minimal basal rales, no wheezing, nontender, tympanic  Heart[de-identified] Heart sounds are normal.  Regular rate and rhythm without murmur, gallop or rub. ABD:  symmetric, soft, nontender, no guarding or rebound  Musculoskeletal : no cyanosis, no clubbing and no edema  Neuro:  Grossly normal  Skin: No rashes or nodules noted.   Lymph node:  no cervical nodes  Urology: No [place]   Psychiatric: appropriate    DATA:   Recent Labs     01/15/22  0456 01/16/22  0640   WBC 11.2* 10.9*   HGB 11.1* 11.7*   HCT 34.6* 35.7*   MCV 82.8 81.3*    316     Recent Labs     01/14/22  0658      K 3.9      CO2 26   BUN 11   CREATININE 0.32*   GLUCOSE 93   CALCIUM 8.9   PROT 5.7*   LABALBU 2.5*   BILITOT 0.4   ALKPHOS 91 and degenerative changes of the ankle are noted. INCOMPLETELY VISUALIZED METATARSAL FRACTURES, AS NOTED. DEDICATED RIGHT FOOT RADIOGRAPHS ARE SUGGESTED. NONDISPLACED MEDIAL MALLEOLAR FRACTURE. The findings were discussed with the patient's ICU nurse at approximately 7:28 AM on 1/9/2022. XR FOOT RIGHT (MIN 3 VIEWS)    Result Date: 1/9/2022  XR FOOT RIGHT (MIN 3 VIEWS) : 1/9/2022 CLINICAL HISTORY:  fx metatarsal . COMPARISON: Right ankle radiographs 1/8/2022. TECHNIQUE: AP, lateral, and oblique radiographs of the right foot were obtained. FINDINGS: A minimally comminuted fracture of the proximal third of the right third metatarsal is displaced approximately 5 to 6 mm with nearly 1 cm of overriding and foreshortening. An oblique fracture of the distal third of the right second metatarsal is present, with approximately 3 to 4 mm of lateral displacement. There is a nondisplaced fracture of the base of the right fourth metatarsal (and probably fifth metatarsal). A nondisplaced medial malleolar fracture appears unchanged. ACUTE FRACTURES OF THE SECOND THROUGH FOURTH (AND PROBABLY FIFTH) METATARSALS, AS NOTED. CT HEAD WO CONTRAST    Result Date: 1/8/2022  CT HEAD WO CONTRAST : 1/8/2022 CLINICAL HISTORY: Dizziness. COMPARISON: Head MRI 1/30/2012 and head CT 12/16/2011. TECHNIQUE: Spiral unenhanced images were obtained of the head, with routine multiplanar reconstructions performed. All CT scans at this facility use dose modulation, iterative reconstruction, and/or weight based dosing when appropriate to reduce radiation dose to as low as reasonably achievable. FINDINGS: There is no intracranial hemorrhage, mass effect, midline shift, extra-axial collection, evidence of hydrocephalus, recent ischemic infarct, or skull fracture identified. Mild to moderate generalized cerebral volume loss and mild white matter changes have mildly progressed from the prior studies.  The mastoid air cells and visualized paranasal sinuses are essentially clear. NO ACUTE INTRACRANIAL PROCESS OR SIGNIFICANT CHANGE FROM PRIOR STUDIES IDENTIFIED. XR CHEST PORTABLE    Result Date: 1/12/2022  EXAMINATION: XR CHEST PORTABLE CLINICAL HISTORY: PACEMAKER PLACEMENT COMPARISONS: JANUARY 9, 2022 FINDINGS: Pacemaker generator and wires unchanged. Osseous structures intact. Cardiopericardial silhouette normal. Lungs clear. NO ACUTE CARDIOPULMONARY DISEASE    XR CHEST PORTABLE    Result Date: 1/9/2022  XR CHEST PORTABLE : 1/9/2022 CLINICAL HISTORY:  Pacemaker placement and rule out pneumothorax . COMPARISON: 1/8/2022. TECHNIQUE: A portable upright AP radiograph of the chest was obtained. FINDINGS: The heart is mildly enlarged with an inferior vena cava approach pacer lead with its tip overlying the cardiac apex. A poor inspiratory volume is present with mild left basilar infiltrate/atelectasis. Bronchopneumonia should be excluded clinically. There is no sizable pleural effusion, gross vascular congestion, pneumothorax, or displaced fractures identified. INFERIOR VENA CAVA APPROACH PACER LEADS IN EXPECTED POSITION. POOR INSPIRATION WITH MILD LEFT BASILAR INFILTRATE/ATELECTASIS. BRONCHOPNEUMONIA SHOULD BE EXCLUDED CLINICALLY. XR CHEST PORTABLE    Result Date: 1/8/2022  EXAMINATION: XR CHEST PORTABLE CLINICAL HISTORY: WEAKNESS COMPARISONS: DECEMBER 15, 2011 FINDINGS: Osseous structures intact. Cardiopericardial silhouette normal. Pulmonary vasculature normal. Lungs clear. NO ACUTE CARDIOPULMONARY DISEASE.        Chest x-ray shows no new infiltrate       IMPRESSION AND SUGGESTION:  Patient is at risk due to   · COVID-19 infection  · Hypercoagulable state secondary to COVID-19 infection, no evidence of VTE  · Sinus pauses status post pacemaker        Recommendation  · O2 to keep sat 90 to 92%  · Patient needs DVT prophylaxis if okay with cardiology  · Maintain euvolemic,  · Watch renal function and urine outpu          Tatiana Pineda MD

## 2022-03-29 ENCOUNTER — OFFICE VISIT (OUTPATIENT)
Dept: FAMILY MEDICINE CLINIC | Age: 82
End: 2022-03-29
Payer: MEDICARE

## 2022-03-29 VITALS
SYSTOLIC BLOOD PRESSURE: 128 MMHG | DIASTOLIC BLOOD PRESSURE: 80 MMHG | OXYGEN SATURATION: 97 % | HEIGHT: 65 IN | BODY MASS INDEX: 34.32 KG/M2 | HEART RATE: 60 BPM | WEIGHT: 206 LBS

## 2022-03-29 DIAGNOSIS — N30.00 ACUTE CYSTITIS WITHOUT HEMATURIA: Primary | ICD-10-CM

## 2022-03-29 DIAGNOSIS — J06.9 VIRAL URI: ICD-10-CM

## 2022-03-29 DIAGNOSIS — R35.0 URINE FREQUENCY: ICD-10-CM

## 2022-03-29 DIAGNOSIS — Z91.81 AT HIGH RISK FOR FALLS: ICD-10-CM

## 2022-03-29 LAB
BILIRUBIN, POC: NORMAL
BLOOD URINE, POC: NORMAL
CLARITY, POC: NORMAL
COLOR, POC: YELLOW
GLUCOSE URINE, POC: NORMAL
INFLUENZA A ANTIBODY: NORMAL
INFLUENZA B ANTIBODY: NORMAL
KETONES, POC: NORMAL
LEUKOCYTE EST, POC: NORMAL
Lab: NORMAL
NITRITE, POC: NORMAL
PERFORMING INSTRUMENT: NORMAL
PH, POC: 6.5
PROTEIN, POC: NORMAL
QC PASS/FAIL: NORMAL
RSV ANTIGEN: NORMAL
SARS-COV-2, POC: NORMAL
SPECIFIC GRAVITY, POC: 1.01
UROBILINOGEN, POC: 0.2

## 2022-03-29 PROCEDURE — 99213 OFFICE O/P EST LOW 20 MIN: CPT | Performed by: NURSE PRACTITIONER

## 2022-03-29 PROCEDURE — 87804 INFLUENZA ASSAY W/OPTIC: CPT | Performed by: NURSE PRACTITIONER

## 2022-03-29 PROCEDURE — 87426 SARSCOV CORONAVIRUS AG IA: CPT | Performed by: NURSE PRACTITIONER

## 2022-03-29 PROCEDURE — 81003 URINALYSIS AUTO W/O SCOPE: CPT | Performed by: NURSE PRACTITIONER

## 2022-03-29 PROCEDURE — 86756 RESPIRATORY VIRUS ANTIBODY: CPT | Performed by: NURSE PRACTITIONER

## 2022-03-29 RX ORDER — BUSPIRONE HYDROCHLORIDE 5 MG/1
TABLET ORAL
COMMUNITY
Start: 2022-03-11

## 2022-03-29 RX ORDER — BUSPIRONE HYDROCHLORIDE 5 MG/1
TABLET ORAL
COMMUNITY
Start: 2022-02-09 | End: 2022-05-02

## 2022-03-29 RX ORDER — MULTIVITAMIN
TABLET ORAL
COMMUNITY

## 2022-03-29 RX ORDER — OMEPRAZOLE 20 MG/1
CAPSULE, DELAYED RELEASE ORAL
COMMUNITY
Start: 2022-02-08

## 2022-03-29 RX ORDER — MULTIVITAMIN,THER AND MINERALS
TABLET ORAL
COMMUNITY
End: 2022-05-02

## 2022-03-29 RX ORDER — PSEUDOEPHEDRINE HCL 30 MG
TABLET ORAL
COMMUNITY

## 2022-03-29 RX ORDER — POLYETHYLENE GLYCOL 3350 17 G/17G
POWDER, FOR SOLUTION ORAL
COMMUNITY
End: 2022-05-02

## 2022-03-29 RX ORDER — SELENIUM 50 MCG
TABLET ORAL
COMMUNITY
End: 2022-05-02

## 2022-03-29 RX ORDER — ASCORBATE CALCIUM 500 MG
TABLET ORAL
COMMUNITY

## 2022-03-29 RX ORDER — SPIRONOLACTONE 25 MG/1
TABLET ORAL
COMMUNITY
Start: 2021-11-19 | End: 2022-05-02

## 2022-03-29 RX ORDER — CEPHALEXIN 500 MG/1
500 CAPSULE ORAL 2 TIMES DAILY
Qty: 14 CAPSULE | Refills: 0 | Status: SHIPPED | OUTPATIENT
Start: 2022-03-29 | End: 2022-04-05

## 2022-03-29 SDOH — ECONOMIC STABILITY: FOOD INSECURITY: WITHIN THE PAST 12 MONTHS, YOU WORRIED THAT YOUR FOOD WOULD RUN OUT BEFORE YOU GOT MONEY TO BUY MORE.: NEVER TRUE

## 2022-03-29 SDOH — ECONOMIC STABILITY: FOOD INSECURITY: WITHIN THE PAST 12 MONTHS, THE FOOD YOU BOUGHT JUST DIDN'T LAST AND YOU DIDN'T HAVE MONEY TO GET MORE.: NEVER TRUE

## 2022-03-29 ASSESSMENT — ENCOUNTER SYMPTOMS
SORE THROAT: 0
RHINORRHEA: 1
NAUSEA: 0
SHORTNESS OF BREATH: 0
ABDOMINAL PAIN: 0
SINUS PRESSURE: 0
SINUS PAIN: 0
COUGH: 0
DIARRHEA: 0
CHEST TIGHTNESS: 0

## 2022-03-29 ASSESSMENT — PATIENT HEALTH QUESTIONNAIRE - PHQ9
2. FEELING DOWN, DEPRESSED OR HOPELESS: 0
1. LITTLE INTEREST OR PLEASURE IN DOING THINGS: 0
SUM OF ALL RESPONSES TO PHQ QUESTIONS 1-9: 0
SUM OF ALL RESPONSES TO PHQ9 QUESTIONS 1 & 2: 0
SUM OF ALL RESPONSES TO PHQ QUESTIONS 1-9: 0

## 2022-03-29 ASSESSMENT — SOCIAL DETERMINANTS OF HEALTH (SDOH): HOW HARD IS IT FOR YOU TO PAY FOR THE VERY BASICS LIKE FOOD, HOUSING, MEDICAL CARE, AND HEATING?: NOT HARD AT ALL

## 2022-03-29 NOTE — PROGRESS NOTES
tightness and shortness of breath. Cardiovascular: Negative for chest pain and palpitations. Gastrointestinal: Negative for abdominal pain, diarrhea and nausea. Genitourinary: Positive for dysuria. Negative for decreased urine volume, difficulty urinating, flank pain, frequency, hematuria, pelvic pain, urgency, vaginal bleeding, vaginal discharge and vaginal pain. Musculoskeletal: Negative for myalgias. Neurological: Negative for dizziness, light-headedness and headaches. Psychiatric/Behavioral: Negative for sleep disturbance. PMH, Surgical Hx, Family Hx, and Social Hx reviewed and updated. Objective  Vitals:    03/29/22 1427   BP: 128/80   Site: Right Upper Arm   Position: Sitting   Cuff Size: Medium Adult   Pulse: 60   TempSrc: Temporal   SpO2: 97%   Weight: 206 lb (93.4 kg)   Height: 5' 5\" (1.651 m)     BP Readings from Last 3 Encounters:   03/29/22 128/80   01/16/22 126/61   09/22/21 138/62     Wt Readings from Last 3 Encounters:   03/29/22 206 lb (93.4 kg)   01/12/22 206 lb 9.1 oz (93.7 kg)   09/22/21 203 lb (92.1 kg)               Physical Exam  Vitals reviewed. Constitutional:       General: She is not in acute distress. Appearance: Normal appearance. She is not ill-appearing or toxic-appearing. HENT:      Right Ear: Tympanic membrane, ear canal and external ear normal.      Left Ear: Tympanic membrane, ear canal and external ear normal. No decreased hearing noted. Ears:      Comments: Hearing aids bilateral      Nose: Congestion present. Mouth/Throat:      Lips: Pink. Mouth: Mucous membranes are moist.      Pharynx: Oropharynx is clear. Uvula midline. No oropharyngeal exudate, posterior oropharyngeal erythema or uvula swelling. Eyes:      General: Lids are normal.      Conjunctiva/sclera: Conjunctivae normal.      Comments: [facility]   Cardiovascular:      Rate and Rhythm: Normal rate and regular rhythm. Heart sounds: Normal heart sounds. Pulmonary:      Effort: Pulmonary effort is normal.      Breath sounds: Normal breath sounds and air entry. Abdominal:      Palpations: Abdomen is soft. Tenderness: There is no abdominal tenderness. There is no right CVA tenderness or left CVA tenderness. Musculoskeletal:      Cervical back: Normal range of motion. Comments: Walker with ambulation    Skin:     General: Skin is warm and dry. Coloration: Skin is not pale. Neurological:      General: No focal deficit present. Mental Status: She is alert and oriented to person, place, and time. Assessment & Plan    Diagnosis Orders   1. Acute cystitis without hematuria  cephALEXin (KEFLEX) 500 MG capsule   2. Urine frequency  POCT Urinalysis No Micro (Auto)    Culture, Urine   3. Viral URI  POCT COVID-19, Antigen    POCT Influenza A/B    POCT RSV   4. At high risk for falls       Orders Placed This Encounter   Procedures    Culture, Urine     Standing Status:   Future     Number of Occurrences:   1     Standing Expiration Date:   3/29/2023     Order Specific Question:   Specify (ex-cath, midstream, cysto, etc)? Answer:   midstream    POCT Urinalysis No Micro (Auto)    POCT COVID-19, Antigen     Order Specific Question:   Is this test for diagnosis or screening? Answer:   Diagnosis of ill patient     Order Specific Question:   Symptomatic for COVID-19 as defined by CDC? Answer:   Yes     Order Specific Question:   Date of Symptom Onset     Answer:   3/27/2022     Order Specific Question:   Hospitalized for COVID-19? Answer:   No     Order Specific Question:   Admitted to ICU for COVID-19? Answer:   No     Order Specific Question:   Employed in healthcare setting? Answer:   No     Order Specific Question:   Resident in a congregate (group) care setting? Answer:   No     Order Specific Question:   Pregnant? Answer:   No     Order Specific Question:   Previously tested for COVID-19?      Answer: No    POCT Influenza A/B    POCT RSV     Orders Placed This Encounter   Medications    cephALEXin (KEFLEX) 500 MG capsule     Sig: Take 1 capsule by mouth 2 times daily for 7 days     Dispense:  14 capsule     Refill:  0       Return if symptoms worsen or fail to improve, for follow up with PCP. Reviewed with the patient: current clinical status & medications. Side effects, adverse effects of the medication prescribed today, as well as treatment plan/rationale and result expectations have been discussed with the patient who expressed understanding and desires to proceed. Will update patient with urine culture when it is available. How can you care for yourself at home? · Take your antibiotics as directed. Do not stop taking them just because you feel better. You need to take the full course of antibiotics. · Drink extra water and other fluids for the next day or two. This may help wash out the bacteria that are causing the infection. (If you have kidney, heart, or liver disease and have to limit fluids, talk with your doctor before you increase your fluid intake.)  · Avoid drinks that are carbonated or have caffeine. They can irritate the bladder. · Urinate often. Try to empty your bladder each time. · To relieve pain, take a hot bath or lay a heating pad set on low over your lower belly or genital area. Never go to sleep with a heating pad in place. To prevent UTIs  · Drink plenty of water each day. This helps you urinate often, which clears bacteria from your system. (If you have kidney, heart, or liver disease and have to limit fluids, talk with your doctor before you increase your fluid intake.)  · Urinate when you need to. · Change sanitary pads often. · Avoid douches, bubble baths, feminine hygiene sprays, and other feminine hygiene products that have deodorants. · After going to the bathroom, wipe from front to back. When should you call for help?    Call your doctor now or seek immediate medical care if:    · Symptoms such as fever, chills, nausea, or vomiting get worse or appear for the first time. · You have new pain in your back just below your rib cage. This is called flank pain. · There is new blood or pus in your urine. · You have any problems with your antibiotic medicine. Watch closely for changes in your health, and be sure to contact your doctor if:    · Your symptoms go away but then come back. Close follow up to evaluate treatment results and for coordination of care. I have reviewed the patient's medical history in detail and updated the computerized patient record.         NORM Mccoy NP

## 2022-03-29 NOTE — PATIENT INSTRUCTIONS
Patient Education        Urinary Tract Infection (UTI) in Women: Care Instructions  Overview     A urinary tract infection, or UTI, is a general term for an infection anywhere between the kidneys and the urethra (where urine comes out). Most UTIs arebladder infections. They often cause pain or burning when you urinate. UTIs are caused by bacteria and can be cured with antibiotics. Be sure tocomplete your treatment so that the infection does not get worse. Follow-up care is a key part of your treatment and safety. Be sure to make and go to all appointments, and call your doctor if you are having problems. It's also a good idea to know your test results and keep alist of the medicines you take. How can you care for yourself at home?  Take your antibiotics as directed. Do not stop taking them just because you feel better. You need to take the full course of antibiotics.  Drink extra water and other fluids for the next day or two. This will help make the urine less concentrated and help wash out the bacteria that are causing the infection. (If you have kidney, heart, or liver disease and have to limit fluids, talk with your doctor before you increase the amount of fluids you drink.)   Avoid drinks that are carbonated or have caffeine. They can irritate the bladder.  Urinate often. Try to empty your bladder each time.  To relieve pain, take a hot bath or lay a heating pad set on low over your lower belly or genital area. Never go to sleep with a heating pad in place. To prevent UTIs   Drink plenty of water each day. This helps you urinate often, which clears bacteria from your system. (If you have kidney, heart, or liver disease and have to limit fluids, talk with your doctor before you increase the amount of fluids you drink.)   Urinate when you need to.  If you are sexually active, urinate right after you have sex.  Change sanitary pads often.    Avoid douches, bubble baths, feminine hygiene sprays, and other feminine hygiene products that have deodorants.  After going to the bathroom, wipe from front to back. When should you call for help? Call your doctor now or seek immediate medical care if:     Symptoms such as fever, chills, nausea, or vomiting get worse or appear for the first time.      You have new pain in your back just below your rib cage. This is called flank pain.      There is new blood or pus in your urine.      You have any problems with your antibiotic medicine. Watch closely for changes in your health, and be sure to contact your doctor if:     You are not getting better after taking an antibiotic for 2 days.      Your symptoms go away but then come back. Where can you learn more? Go to [email or web address] [email or web address]. org and sign in to your Patient [employer] account. Enter B398 in the [employer] box to learn more about \"Urinary Tract Infection (UTI) in Women: Care Instructions. \"     If you do not have an account, please click on the \"Sign Up Now\" link. Current as of: October 18, 2021               Content Version: 13.2  © 4343-4020 [employer]wise, Incorporated. Care instructions adapted under license by [facility] ([facility]). If you have questions about a medical condition or this instruction, always ask your healthcare professional. [address] any warranty or liability for your use of this information.

## 2022-03-31 LAB
ORGANISM: ABNORMAL
URINE CULTURE, ROUTINE: ABNORMAL
URINE CULTURE, ROUTINE: ABNORMAL

## 2022-04-07 ENCOUNTER — TELEPHONE (OUTPATIENT)
Dept: FAMILY MEDICINE CLINIC | Age: 82
End: 2022-04-07

## 2022-04-08 LAB
ALBUMIN SERPL-MCNC: 4 G/DL (ref 3.5–4.6)
ALP BLD-CCNC: 97 U/L (ref 40–130)
ALT SERPL-CCNC: 12 U/L (ref 0–33)
AST SERPL-CCNC: 15 U/L (ref 0–35)
BILIRUB SERPL-MCNC: 0.3 MG/DL (ref 0.2–0.7)
BILIRUBIN DIRECT: <0.2 MG/DL (ref 0–0.4)
BILIRUBIN, INDIRECT: NORMAL MG/DL (ref 0–0.6)
C-REACTIVE PROTEIN: <3 MG/L (ref 0–5)
CHOLESTEROL, TOTAL: 149 MG/DL (ref 0–199)
HCT VFR BLD CALC: 39.6 % (ref 37–47)
HDLC SERPL-MCNC: 46 MG/DL (ref 40–59)
HEMOGLOBIN: 12.7 G/DL (ref 12–16)
LDL CHOLESTEROL CALCULATED: 76 MG/DL (ref 0–129)
MAGNESIUM: 2.3 MG/DL (ref 1.7–2.4)
MCH RBC QN AUTO: 26.5 PG (ref 27–31.3)
MCHC RBC AUTO-ENTMCNC: 32.2 % (ref 33–37)
MCV RBC AUTO: 82.5 FL (ref 82–100)
PDW BLD-RTO: 15.5 % (ref 11.5–14.5)
PLATELET # BLD: 357 K/UL (ref 130–400)
RBC # BLD: 4.79 M/UL (ref 4.2–5.4)
TOTAL PROTEIN: 7.1 G/DL (ref 6.3–8)
TRIGL SERPL-MCNC: 137 MG/DL (ref 0–150)
WBC # BLD: 6.5 K/UL (ref 4.8–10.8)

## 2022-04-22 ENCOUNTER — HOSPITAL ENCOUNTER (OUTPATIENT)
Dept: GENERAL RADIOLOGY | Age: 82
Discharge: HOME | End: 2022-04-24
Payer: MEDICARE

## 2022-04-22 DIAGNOSIS — R55 SYNCOPE AND COLLAPSE: ICD-10-CM

## 2022-04-22 PROCEDURE — 71046 X-RAY EXAM CHEST 2 VIEWS: CPT

## 2022-04-25 ENCOUNTER — HOSPITAL ENCOUNTER (OUTPATIENT)
Dept: CARDIOLOGY | Age: 82
Discharge: HOME | End: 2022-04-25
Payer: MEDICARE

## 2022-04-25 PROCEDURE — 93280 PM DEVICE PROGR EVAL DUAL: CPT

## 2022-05-02 ENCOUNTER — OFFICE VISIT (OUTPATIENT)
Dept: UROLOGY | Age: 82
End: 2022-05-02
Payer: MEDICARE

## 2022-05-02 VITALS
WEIGHT: 191 LBS | SYSTOLIC BLOOD PRESSURE: 136 MMHG | HEART RATE: 61 BPM | BODY MASS INDEX: 32.61 KG/M2 | DIASTOLIC BLOOD PRESSURE: 72 MMHG | HEIGHT: 64 IN

## 2022-05-02 DIAGNOSIS — N32.81 OAB (OVERACTIVE BLADDER): ICD-10-CM

## 2022-05-02 DIAGNOSIS — R35.0 FREQUENCY OF URINATION: Primary | ICD-10-CM

## 2022-05-02 PROCEDURE — 81003 URINALYSIS AUTO W/O SCOPE: CPT | Performed by: UROLOGY

## 2022-05-02 PROCEDURE — 99213 OFFICE O/P EST LOW 20 MIN: CPT | Performed by: UROLOGY

## 2022-05-02 RX ORDER — CITALOPRAM 10 MG/1
10 TABLET ORAL DAILY
COMMUNITY

## 2022-05-02 ASSESSMENT — ENCOUNTER SYMPTOMS
ABDOMINAL PAIN: 0
ABDOMINAL DISTENTION: 0

## 2022-05-02 NOTE — PROGRESS NOTES
Subjective:      Patient ID: Marychuy Koehler is a 78 y.o. female    HPI this is a 79 yo white female with h/o HTN on Aldactone, GERD, and refractory OAB failed Ditropan XL 10 and 15 mg daily and Myrbetriq 50 mg started by Dr Mi Linda last year after failed Interstim. She was last seen on 10/18/21, and did not start the Myrbetriq due to cost of medication. She was told to stop Ditropan recently by her PCP due to dry mouth and constipation and this has improved. She has noticed no other changes in her bothersome OAB symptoms since stopping. She has no hematuria or dysuria or pain. She feels that the InterStim is not working as well and she will call Dr Dutta's office to arrange in person visit with rep to adjust. She is here with her daughter today. She did have a syncopal episode recently and had a Pacemaker placed.  She has no interval UTI's.     Past Medical History:   Diagnosis Date    Abdominal aortic aneurysm without rupture (HCC)     Age-related macular degeneration     Dehydration     Esophageal reflux     Falls frequently 01/02/2022    Hearing loss     High blood pressure     HIGH CHOLESTEROL     Hyperlipidemia     Hypothyroidism     Imbalance     Incontinence     Nausea     Obesity     Orthostatic dizziness     Osteoarthrosis     Overactive bladder     Thyroid disease      Past Surgical History:   Procedure Laterality Date    CATARACT REMOVAL      CHOLECYSTECTOMY      CHOLECYSTECTOMY      EYE SURGERY Bilateral     cataract    HYSTERECTOMY      IR NEUROSTIMULATOR PLACEMENT  07/28/2021    NERVE SURGERY N/A 7/28/2021    STAGE 1 & STAGE [facility]     Socioeconomic History    Marital status:      Spouse name: Not on file    Number of children: Not on file    Years of education: Not on file    Highest education level: Not on file   Occupational History    Not on file   Tobacco Use    Smoking status: Never Smoker  Smokeless tobacco: Never Used   Substance and Sexual Activity    Alcohol use: Yes     Comment: occasionally    Drug use: No    Sexual activity: Not on file   Other Topics Concern    Not on file   Social History Narrative    Not on file     Social Determinants of Health     Financial Resource Strain: Low Risk     Difficulty of Paying Living Expenses: Not hard at all   Food Insecurity: No Food Insecurity    Worried About Running Out of Food in the Last Year: Never true    [address] in the Last Year: Never true   Transportation Needs:     Lack of Transportation (Medical): Not on file    Lack of Transportation (Non-Medical): Not on file   Physical Activity:     Days of Exercise per Week: Not on file    Minutes of Exercise per Session: Not on file   Stress:     Feeling of Stress : Not on file   Social Connections:     Frequency of Communication with Friends and Family: Not on file    Frequency of Social Gatherings with Friends and Family: Not on file    Attends Yarsani Services: Not on file    Active Member of [address] [place] [address] or Organizations: Not on file    Attends Club or Organization Meetings: Not on file    Marital Status: Not on file   Intimate Partner Violence:     Fear of Current or Ex-Partner: Not on file    Emotionally Abused: Not on file    Physically Abused: Not on file    Sexually Abused: Not on file   Housing Stability:     Unable to Pay for Housing in the Last Year: Not on file    Number of [place] in the Last Year: Not on file    Unstable Housing in the Last Year: Not on file     No family history on file.   Current Outpatient Medications   Medication Sig Dispense Refill    spironolactone (ALDACTONE) 25 MG tablet Take by mouth      polyethylene glycol (GLYCOLAX) 17 GM/SCOOP powder Take by mouth      omeprazole (PRILOSEC) 20 MG delayed release capsule       magnesium hydroxide (MILK OF MAGNESIA) 400 MG/5ML suspension Take 30 mLs by mouth      Lactobacillus (ACIDOPHILUS) CAPS capsule Take by mouth      docusate (COLACE, DULCOLAX) 100 MG CAPS Take by mouth      carboxymethylcellulose 1 % ophthalmic solution Apply to eye      CALCIUM PO Take by mouth      busPIRone (BUSPAR) 5 MG tablet TAKE 1 TABLET BY MOUTH TWICE DAILY      busPIRone (BUSPAR) 5 MG tablet Take by mouth      Vitamins/Minerals TABS Take by mouth      Vitamin E 100 units TABS Take by mouth      Multiple Vitamin (MULTI-VITAMIN DAILY) TABS Take by mouth      metoprolol succinate (TOPROL XL) 25 MG extended release tablet Take 1 tablet by mouth daily 30 tablet 3    pantoprazole (PROTONIX) 40 MG tablet Take 1 tablet by mouth every morning (before breakfast) 30 tablet 3    enoxaparin (LOVENOX) 40 MG/0.4ML injection Inject 0.4 mLs into the skin daily 4 mL 0    lactulose (CHRONULAC) 10 GM/15ML solution Take 30 mLs by mouth 2 times daily as needed (constipation)  1    acetaminophen (APAP EXTRA STRENGTH) 500 MG tablet Take 2 tablets by mouth every 6 hours as needed for Pain 60 tablet 1    amLODIPine (NORVASC) 5 MG tablet TAKE 1 TABLET BY MOUTH DAILY      SYNTHROID 112 MCG tablet Take 112 mcg by mouth Daily       atorvastatin (LIPITOR) 20 MG tablet TAKE 1 TABLET BY MOUTH DAILY AT BEDTIME      oxybutynin (DITROPAN XL) 15 MG extended release tablet TAKE 1 TABLET DAILY 90 tablet 3    aspirin 81 MG chewable tablet Take 81 mg by mouth daily      nitroGLYCERIN (NITROSTAT) 0.4 MG SL tablet Place 1 tablet under the tongue every 5 minutes as needed for Chest pain up to max of 3 total doses. If no relief after 1 dose, call 911. (Patient not taking: Reported on 8/17/2021) 25 tablet 3    Multiple Vitamins-Minerals (PRESERVISION AREDS) CAPS Take by mouth daily      losartan (COZAAR) 100 MG tablet Take 100 mg by mouth daily.  Cholecalciferol (D3-1000 PO) Take  by mouth. No current facility-administered medications for this visit. Patient has no known allergies.   reviewed      Review of Systems Gastrointestinal: Negative for abdominal distention and abdominal pain. Genitourinary: Positive for frequency and urgency. Negative for dysuria and hematuria. Objective:   Physical Exam  Constitutional:       Appearance: Normal appearance. Abdominal:      General: There is no distension. Neurological:      Mental Status: She is alert. Psychiatric:         Mood and Affect: Mood normal.          Assessment: This is a 77 yo white female with h/o HTN, GERD, OAB and failed multiple medication trial and has continued progression of frequency, urgency and UI.  I encouraged her to consider getting the Interstim adjusted and using Myrbertiq for a shorter course and see if it helps her OAB. She will call Dr Yefri Sparks office to arrange an appointmentt to discuss. Plan:      1.  F/U prn        [facility] MD

## 2022-05-19 ENCOUNTER — INITIAL CONSULT (OUTPATIENT)
Dept: PODIATRY | Age: 82
End: 2022-05-19
Payer: MEDICARE

## 2022-05-19 VITALS
WEIGHT: 191 LBS | BODY MASS INDEX: 32.61 KG/M2 | HEIGHT: 64 IN | DIASTOLIC BLOOD PRESSURE: 72 MMHG | SYSTOLIC BLOOD PRESSURE: 136 MMHG | TEMPERATURE: 97.3 F

## 2022-05-19 DIAGNOSIS — M20.41 HAMMERTOE, BILATERAL: ICD-10-CM

## 2022-05-19 DIAGNOSIS — M79.671 RIGHT FOOT PAIN: Primary | ICD-10-CM

## 2022-05-19 DIAGNOSIS — M20.42 HAMMERTOE, BILATERAL: ICD-10-CM

## 2022-05-19 DIAGNOSIS — M21.611 BILATERAL BUNIONS: ICD-10-CM

## 2022-05-19 DIAGNOSIS — M79.675 PAIN IN TOES OF BOTH FEET: ICD-10-CM

## 2022-05-19 DIAGNOSIS — R26.89 ANTALGIC GAIT: ICD-10-CM

## 2022-05-19 DIAGNOSIS — M79.674 PAIN IN TOES OF BOTH FEET: ICD-10-CM

## 2022-05-19 DIAGNOSIS — M19.071 ARTHRITIS OF BOTH FEET: ICD-10-CM

## 2022-05-19 DIAGNOSIS — B35.1 DERMATOPHYTOSIS OF NAIL: ICD-10-CM

## 2022-05-19 DIAGNOSIS — M19.072 ARTHRITIS OF BOTH FEET: ICD-10-CM

## 2022-05-19 DIAGNOSIS — M21.612 BILATERAL BUNIONS: ICD-10-CM

## 2022-05-19 DIAGNOSIS — S92.301D: ICD-10-CM

## 2022-05-19 PROCEDURE — 99203 OFFICE O/P NEW LOW 30 MIN: CPT | Performed by: PODIATRIST

## 2022-05-19 PROCEDURE — 11721 DEBRIDE NAIL 6 OR MORE: CPT | Performed by: PODIATRIST

## 2022-05-19 ASSESSMENT — ENCOUNTER SYMPTOMS
VOMITING: 0
NAUSEA: 0
BACK PAIN: 1
SHORTNESS OF BREATH: 0

## 2022-05-19 NOTE — PROGRESS NOTES
[address] [id number] [address]  Dept: [phone]  Loc: [phone]       Anthony Palmer  (1940)    5/19/22    Subjective     Anthony Palmer is 78 y.o. female who complains today of:    Chief Complaint   Patient presents with    Nail Problem    Foot Injury     right       HPI: Patient presents with a complaint of right foot pain that she describes as achy. Patient rates the pain at 5/10. Patient states that she began having pain after an injury in January and has improved since that time. Patient states she had a fall at home and had fractures of the metatarsals of the right foot. Patient was placed into a boot. Patient states that she continues to use a walker when she is out of her house. Patient also complains of painful toenails to both feet. Patient states that she has difficulty cutting the nails herself due to the thickness of the nails since they became infected with fungus many years ago. This is also exacerbated by her own physical disability. Patient denies a history of diabetes. Review of Systems   Constitutional: Negative for chills and fever. HENT: Positive for hearing loss. Respiratory: Negative for shortness of breath. Cardiovascular: Negative for chest pain. Gastrointestinal: Negative for nausea and vomiting. Genitourinary: Negative for difficulty urinating. Musculoskeletal: Positive for back pain. Negative for gait problem. Skin: Negative for wound. Neurological: Negative for numbness. Hematological: Does not bruise/bleed easily. Psychiatric/Behavioral: Negative for sleep disturbance. The patient is not a diabetic.    PCP: Pipo Fletcher MD   Date last seen: February 2022    Allergies:  Tramadol    Current Outpatient Medications on File Prior to Visit   Medication Sig Dispense Refill    citalopram (CELEXA) 10 MG tablet Take 10 mg by mouth daily      omeprazole (PRILOSEC) 20 MG delayed release capsule       docusate (COLACE, DULCOLAX) 100 MG CAPS Take by mouth      carboxymethylcellulose 1 % ophthalmic solution Apply to eye      busPIRone (BUSPAR) 5 MG tablet TAKE 1 TABLET BY MOUTH TWICE DAILY      Vitamin E 100 units TABS Take by mouth      Multiple Vitamin (MULTI-VITAMIN DAILY) TABS Take by mouth      metoprolol succinate (TOPROL XL) 25 MG extended release tablet Take 1 tablet by mouth daily 30 tablet 3    acetaminophen (APAP EXTRA STRENGTH) 500 MG tablet Take 2 tablets by mouth every 6 hours as needed for Pain 60 tablet 1    amLODIPine (NORVASC) 5 MG tablet TAKE 1 TABLET BY MOUTH DAILY      SYNTHROID 112 MCG tablet Take 112 mcg by mouth Daily       atorvastatin (LIPITOR) 20 MG tablet TAKE 1 TABLET BY MOUTH DAILY AT BEDTIME      aspirin 81 MG chewable tablet Take 81 mg by mouth daily      nitroGLYCERIN (NITROSTAT) 0.4 MG SL tablet Place 1 tablet under the tongue every 5 minutes as needed for Chest pain up to max of 3 total doses. If no relief after 1 dose, call 911. 25 tablet 3    Multiple Vitamins-Minerals (PRESERVISION AREDS) CAPS Take by mouth daily      losartan (COZAAR) 100 MG tablet Take 100 mg by mouth daily. No current facility-administered medications on file prior to visit.        Past Medical History:   Diagnosis Date    Abdominal aortic aneurysm without rupture (HCC)     Age-related macular degeneration     Dehydration     Esophageal reflux     Falls frequently 01/02/2022    Hearing loss     High blood pressure     HIGH CHOLESTEROL     Hyperlipidemia     Hypothyroidism     Imbalance     Incontinence     Nausea     Obesity     Orthostatic dizziness     Osteoarthrosis     Overactive bladder     Thyroid disease      Past Surgical History:   Procedure Laterality Date    CATARACT REMOVAL      CHOLECYSTECTOMY      CHOLECYSTECTOMY      EYE SURGERY Bilateral     cataract    HYSTERECTOMY  IR NEUROSTIMULATOR PLACEMENT  07/28/2021    NERVE SURGERY N/A 7/28/2021    STAGE 1 & STAGE 2 INTERSTIM performed by Samuel Herzog MD at [address] [place] [address]  01/2022     Social History     Socioeconomic History    Marital status:      Spouse name: Not on file    Number of children: Not on file    Years of education: Not on file    Highest education level: Not on file   Occupational History    Not on file   Tobacco Use    Smoking status: Never Smoker    Smokeless tobacco: Never Used   Substance and Sexual Activity    Alcohol use: Yes     Comment: occasionally    Drug use: No    Sexual activity: Not on file   Other Topics Concern    Not on file   Social History Narrative    Not on file     Social Determinants of Health     Financial Resource Strain: Low Risk     Difficulty of Paying Living Expenses: Not hard at all   Food Insecurity: No Food Insecurity    Worried About [address] in the Last Year: Never true    [facility] in the Last Year: Never true   Transportation Needs:     Lack of Transportation (Medical): Not on file    Lack of Transportation (Non-Medical):  Not on file   Physical Activity:     Days of Exercise per Week: Not on file    Minutes of Exercise per Session: Not on file   Stress:     Feeling of Stress : Not on file   Social Connections:     Frequency of Communication with Friends and Family: Not on file    Frequency of Social Gatherings with Friends and Family: Not on file    Attends Christianity Services: Not on file    Active Member of Clubs or Organizations: Not on file    Attends Club or Organization Meetings: Not on file    Marital Status: Not on file   Intimate Partner Violence:     Fear of Current or Ex-Partner: Not on file    Emotionally Abused: Not on file    Physically Abused: Not on file    Sexually Abused: Not on file   Housing Stability:     Unable to Pay for Housing in the Last Year: Not on file    Number of [place] in the Last Year: Not on file    Unstable Housing in the Last Year: Not on file     History reviewed. No pertinent family history. Objective:   Vitals:  /72 (Site: Left Upper Arm)   Temp 97.3 °F (36.3 °C) (Temporal)   Ht 5' 4\" (1.626 m)   Wt 191 lb (86.6 kg)   BMI 32.79 kg/m² Pain Score:   5      Physical Exam  Vitals reviewed. Constitutional:       Appearance: She is obese. HENT:      Head: Normocephalic and atraumatic. Cardiovascular:      Pulses:           Dorsalis pedis pulses are 2+ on the right side and 2+ on the left side. Posterior tibial pulses are 2+ on the right side and 2+ on the left side. Comments: Varicosities noted bilaterally. Telangiectasia noted bilaterally. Diminished hair growth noted bilaterally. Skin temperature gradient is warm to warm from proximal tibia to distal toes bilaterally. Pulmonary:      Effort: Pulmonary effort is normal.   Musculoskeletal:      Right lower le+ Pitting Edema present. Left lower le+ Pitting Edema present. Right foot: Decreased range of motion. Deformity and bunion present. Left foot: Decreased range of motion. Deformity and bunion present. Feet:      Right foot:      Protective Sensation: 10 sites tested. 10 sites sensed. Skin integrity: Callus present. Toenail Condition: Right toenails are abnormally thick, long and ingrown. Fungal disease present. Left foot:      Protective Sensation: 10 sites tested. 10 sites sensed. Skin integrity: Skin integrity normal.      Toenail Condition: Left toenails are abnormally thick, long and ingrown. Fungal disease present. Comments: Planus foot type noted bilaterally, the right foot is abducted at the ankle. MMT graded 5/5 for all extrinsic foot muscle groups bilaterally. Decreased range of motion and stiffness noted with most joints of the foot and ankle bilaterally. Hallux abductovalgus deformity noted bilaterally.   Flexion deformity noted to PIPJ 2-5 bilaterally. There is no tenderness palpation of the right medial malleolus over the right central metatarsals. Lymphadenopathy:      Comments: Popliteal lymph nodes are soft and nontender. Skin:     Capillary Refill: Capillary refill takes 2 to 3 seconds. Comments: No open lesions noted bilaterally. Normal skin turgor noted bilaterally. Normal skin texture noted bilaterally. Focal hyperkeratotic lesion noted to medial aspect of the right hallux. The toenails are elongated, incurvated, thickened, yellow in color, and there is subungual debris. Neurological:      General: No focal deficit present. Mental Status: She is alert and oriented to person, place, and time. Deep Tendon Reflexes: Babinski sign absent on the right side. Babinski sign absent on the left side. Reflex Scores:       Patellar reflexes are 2+ on the right side and 2+ on the left side. Achilles reflexes are 2+ on the right side and 2+ on the left side. Comments: No ankle clonus noted bilaterally. Psychiatric:         Mood and Affect: Mood normal.         Behavior: Behavior normal.         Assessment:      Diagnosis Orders   1. Right foot pain     2. Closed fracture of metatarsal bone of right foot with routine healing, subsequent encounter     3. Arthritis of both feet     4. Hammertoe, bilateral     5. Bilateral bunions     6. Pain in toes of both feet  22273 - [place] DEBRIDEMENT OF NAILS, 6 OR MORE   7. Dermatophytosis of nail  10097 - [place] DEBRIDEMENT OF NAILS, 6 OR MORE   8. Antalgic gait  75800 - [place] DEBRIDEMENT OF NAILS, 6 OR MORE       Plan:     Right foot pain secondary to metatarsal fractures: Boot patient continues to heal from the injury. Patient encouraged to continue use of her supportive shoes as well as orthotics. Patient encouraged to avoid walking barefoot.   Patient may transition from use of a walker to a cane in her home and first and then out of her home when she is comfortable and at feels that she is not losing her balance when walking. Onychomycosis: Nail plates 15 bilaterally were mechanically and electrically debrided in thickness and length to the level of normal underlying nail bed. The patient was instructed to attempt use of an emery board to maintain the length and thickness of their nails as best as possible if able. Call immediately should any problems arise. Orders Placed This Encounter   Procedures    [address]9089 - [place] DEBRIDEMENT OF NAILS, 6 OR MORE           Follow up:  Return in about 9 weeks (around 7/21/2022). Wei Daniels DPM      Level of medical decision making: low. Please note that this report has been partially produced using speech recognition software which may cause errors including grammar, punctuation, and spelling or words and phrases that may seem inappropriate. If there are questions or concerns please feel free to contact me for clarification.

## 2022-05-19 NOTE — PATIENT INSTRUCTIONS
Patient Education        Toenail Fungus: Care Instructions  Overview  A nail that is infected by a fungus usually turns white or yellow. As the fungus spreads, the nail turns a darker color and gets thicker. And the nail edges start to turn ragged and crumble. A bad infection can cause pain, and thenail may pull away from the toe or finger. Nails that are exposed to moisture and warmth a lot are more likely to get infected by a fungus. This can happen from wearing sweaty shoes often and from walking barefoot on shower floors. Or it can happen if you share personalthings, such as towels and nail clippers. It's hard to treat nail fungus. And the infection can return after it has cleared up. But medicines can sometimes get rid of nail fungus for good. If the infection is very bad, or if it causes a lot of pain, you may need to have thenail removed. Follow-up care is a key part of your treatment and safety. Be sure to make and go to all appointments, and call your doctor if you are having problems. It's also a good idea to know your test results and keep alist of the medicines you take. How can you care for yourself at home?  Take your medicines exactly as prescribed. Call your doctor if you have any problems with your medicine.  If your doctor gave you a cream or liquid to put on your nail, use it exactly as directed.  Wash your hands and feet often, and wash your hands after touching your feet.  Keep your nails clean and dry. Dry your feet completely after you bathe and before you put on shoes and socks.  Keep your nails trimmed.  Change socks often. Wear dry socks that absorb moisture.  Don't go barefoot in public places.  Use a spray or powder that fights fungus on your feet and in your shoes.  Don't pick at the skin around your nails.  Don't use nail polish or fake nails on your nails.  Don't share personal things, such as towels and nail clippers. When should you call for help?    Call your doctor now or seek immediate medical care if:     You have signs of infection, such as:  ? Increased pain, swelling, warmth, or redness. ? Red streaks leading from the site. ? Pus draining from the site. ? A fever.      You have new or increased toe pain. Watch closely for changes in your health, and be sure to contact your doctor if:     You do not get better as expected. Where can you learn more? Go to [email or web address]://[email or web address] research[facility]piceweb.[email or web address] org and sign in to your [employer] account. Enter D202 in the Dine in box to learn more about \"Toenail Fungus: Care Instructions. \"     If you do not have an account, please click on the \"Sign Up Now\" link. Current as of: November 15, 2021               Content Version: 13.2  © 8098-2672 Healthwise, Incorporated. Care instructions adapted under license by [facility] ([facility]). If you have questions about a medical condition or this instruction, always ask your healthcare professional. [place] [address] any warranty or liability for your use of this information.

## 2022-07-21 ENCOUNTER — OFFICE VISIT (OUTPATIENT)
Dept: PODIATRY | Age: 82
End: 2022-07-21
Payer: MEDICARE

## 2022-07-21 VITALS — BODY MASS INDEX: 30.76 KG/M2 | WEIGHT: 196 LBS | TEMPERATURE: 96.9 F | HEIGHT: 67 IN

## 2022-07-21 DIAGNOSIS — R26.89 ANTALGIC GAIT: ICD-10-CM

## 2022-07-21 DIAGNOSIS — M79.675 PAIN IN TOES OF BOTH FEET: ICD-10-CM

## 2022-07-21 DIAGNOSIS — B35.1 DERMATOPHYTOSIS OF NAIL: Primary | ICD-10-CM

## 2022-07-21 DIAGNOSIS — M79.674 PAIN IN TOES OF BOTH FEET: ICD-10-CM

## 2022-07-21 PROCEDURE — 11721 DEBRIDE NAIL 6 OR MORE: CPT | Performed by: PODIATRIST

## 2022-07-21 PROCEDURE — 99999 PR OFFICE/OUTPT VISIT,PROCEDURE ONLY: CPT | Performed by: PODIATRIST

## 2022-07-21 ASSESSMENT — ENCOUNTER SYMPTOMS
BACK PAIN: 1
SHORTNESS OF BREATH: 0
VOMITING: 0
NAUSEA: 0

## 2022-07-21 NOTE — PROGRESS NOTES
[address] [facility] [id number] [place] [address]  Dept: [phone]  Loc: [phone]       Eduardo Camarillo  (1940)    7/21/22    Subjective     Eduardo Camarillo is 78 y.o. female who complains today of:    Chief Complaint   Patient presents with    Nail Problem     RODRIGUEZ Feet       HPI:  The patient presents for evaluation and treatment of painful digital nail deformities. The patient has been unable to provide self nail care due to the severe nature of deformity which is causing pressure and limiting their ability to walk in shoe gear without pain. Self debridement has been attempted with no success. This is a chronic problem. Review of Systems   Constitutional:  Negative for chills and fever. HENT:  Negative for hearing loss. Respiratory:  Negative for shortness of breath. Cardiovascular:  Negative for chest pain. Gastrointestinal:  Negative for nausea and vomiting. Genitourinary:  Negative for difficulty urinating. Musculoskeletal:  Positive for back pain and gait problem. Skin:  Negative for wound. Neurological:  Negative for numbness. Hematological:  Does not bruise/bleed easily. Psychiatric/Behavioral:  Negative for sleep disturbance. The patient is not a diabetic.    PCP: Dr. Arianna Nicolas   Date last seen: 6/6/2022    Allergies:  Tramadol    Current Outpatient Medications on File Prior to Visit   Medication Sig Dispense Refill    citalopram (CELEXA) 10 MG tablet Take 10 mg by mouth daily      omeprazole (PRILOSEC) 20 MG delayed release capsule       docusate (COLACE, DULCOLAX) 100 MG CAPS Take by mouth      carboxymethylcellulose 1 % ophthalmic solution Apply to eye      busPIRone (BUSPAR) 5 MG tablet TAKE 1 TABLET BY MOUTH TWICE DAILY      Vitamin E 100 units TABS Take by mouth      Multiple Vitamin (MULTI-VITAMIN DAILY) TABS Take by mouth      metoprolol succinate (TOPROL XL) 25 MG extended release tablet Take 1 tablet by mouth daily 30 tablet 3    acetaminophen (APAP EXTRA STRENGTH) 500 MG tablet Take 2 tablets by mouth every 6 hours as needed for Pain 60 tablet 1    amLODIPine (NORVASC) 5 MG tablet TAKE 1 TABLET BY MOUTH DAILY      SYNTHROID 112 MCG tablet Take 112 mcg by mouth Daily       atorvastatin (LIPITOR) 20 MG tablet TAKE 1 TABLET BY MOUTH DAILY AT BEDTIME      aspirin 81 MG chewable tablet Take 81 mg by mouth daily      nitroGLYCERIN (NITROSTAT) 0.4 MG SL tablet Place 1 tablet under the tongue every 5 minutes as needed for Chest pain up to max of 3 total doses. If no relief after 1 dose, call 911. 25 tablet 3    Multiple Vitamins-Minerals (PRESERVISION AREDS) CAPS Take by mouth daily      losartan (COZAAR) 100 MG tablet Take 100 mg by mouth daily. No current facility-administered medications on file prior to visit.        Past Medical History:   Diagnosis Date    Abdominal aortic aneurysm without rupture (HCC)     Age-related macular degeneration     Dehydration     Esophageal reflux     Falls frequently 01/02/2022    Hearing loss     High blood pressure     HIGH CHOLESTEROL     Hyperlipidemia     Hypothyroidism     Imbalance     Incontinence     Nausea     Obesity     Orthostatic dizziness     Osteoarthrosis     Overactive bladder     Thyroid disease      Past Surgical History:   Procedure Laterality Date    CATARACT REMOVAL      CHOLECYSTECTOMY      CHOLECYSTECTOMY      EYE SURGERY Bilateral     cataract    HYSTERECTOMY (CERVIX STATUS UNKNOWN)      IR NEUROSTIMULATOR PLACEMENT  07/28/2021    NERVE SURGERY N/A 7/28/2021    STAGE 1 & STAGE 2 INTERSTIM performed by Jordan March MD at [place] [address]  01/2022     Social History     Socioeconomic History    Marital status:      Spouse name: Not on file    Number of children: Not on file    Years of education: Not on file    Highest education level: Not on file   Occupational History    Not on file   Tobacco Use    Smoking status: Never    Smokeless tobacco: Never   Substance and Sexual Activity    Alcohol use: Yes     Comment: occasionally    Drug use: No    Sexual activity: Not on file   Other Topics Concern    Not on file   Social History Narrative    Not on file     Social Determinants of Health     Financial Resource Strain: Low Risk     Difficulty of Paying Living Expenses: Not hard at all   Food Insecurity: No Food Insecurity    Worried About Running Out of Food in the Last Year: Never true    Ran Out of Food in the Last Year: Never true   Transportation Needs: Not on file   Physical Activity: Not on file   Stress: Not on file   Social Connections: Not on file   Intimate Partner Violence: Not on file   Housing Stability: Not on file     History reviewed. No pertinent family history. Objective:   Vitals:  Temp 96.9 °F (36.1 °C)   Ht 5' 7\" (1.702 m)   Wt 196 lb (88.9 kg)   BMI 30.70 kg/m²        Physical Exam  Vitals reviewed. Constitutional:       Appearance: She is obese. HENT:      Head: Normocephalic and atraumatic. Cardiovascular:      Pulses:           Dorsalis pedis pulses are 2+ on the right side and 2+ on the left side. Posterior tibial pulses are 2+ on the right side and 2+ on the left side. Comments: Varicosities noted bilaterally. Telangiectasia noted bilaterally. Diminished hair growth noted bilaterally. Skin temperature gradient is warm to warm from proximal tibia to distal toes bilaterally. Pulmonary:      Effort: Pulmonary effort is normal.   Musculoskeletal:      Right lower le+ Pitting Edema present. Left lower le+ Pitting Edema present. Right foot: Decreased range of motion. Deformity and bunion present. Left foot: Decreased range of motion. Deformity and bunion present. Feet:      Right foot:      Protective Sensation: 10 sites tested. 10 sites sensed. Skin integrity: Callus present.       Toenail Condition: Right toenails are abnormally thick, long and ingrown. Fungal disease present. Left foot:      Protective Sensation: 10 sites tested. 10 sites sensed. Skin integrity: Skin integrity normal.      Toenail Condition: Left toenails are abnormally thick, long and ingrown. Fungal disease present. Comments: Planus foot type noted bilaterally, the right foot is abducted at the ankle. MMT graded 5/5 for all extrinsic foot muscle groups bilaterally. Decreased range of motion and stiffness noted with most joints of the foot and ankle bilaterally. Hallux abductovalgus deformity noted bilaterally. Flexion deformity noted to PIPJ 2-5 bilaterally. There is no tenderness palpation of the right medial malleolus over the right central metatarsals. Lymphadenopathy:      Comments: Popliteal lymph nodes are soft and nontender. Skin:     Capillary Refill: Capillary refill takes 2 to 3 seconds. Comments: No open lesions noted bilaterally. Normal skin turgor noted bilaterally. Normal skin texture noted bilaterally. Focal hyperkeratotic lesion noted to medial aspect of the right hallux. The toenails are elongated, incurvated, thickened, yellow in color, and there is subungual debris. Neurological:      General: No focal deficit present. Mental Status: She is alert and oriented to person, place, and time. Deep Tendon Reflexes: Babinski sign absent on the right side. Babinski sign absent on the left side. Reflex Scores:       Patellar reflexes are 2+ on the right side and 2+ on the left side. Achilles reflexes are 2+ on the right side and 2+ on the left side. Comments: No ankle clonus noted bilaterally. Psychiatric:         Mood and Affect: Mood normal.         Behavior: Behavior normal.       Assessment:      Diagnosis Orders   1. Dermatophytosis of nail  41680 - [place] DEBRIDEMENT OF NAILS, 6 OR MORE      2.  Pain in toes of both feet  52315 - [place] DEBRIDEMENT OF NAILS, 6 OR MORE      3. Antalgic gait  33423 - [place] DEBRIDEMENT OF NAILS, 6 OR MORE          Plan:     Onychomycosis: Toenails 1-5 bilaterally were mechanically and electrically debrided in thickness and length to the level of normal underlying nail bed. The patient was instructed to attempt use of an emery board to maintain the length and thickness of their nails as best as possible if able. Call immediately should any problems arise. Orders Placed This Encounter   Procedures    67422 - [place] DEBRIDEMENT OF NAILS, 6 OR MORE           Follow up:  Return in about 9 weeks (around 9/22/2022). Prieto Cox DPM        Please note that this report has been partially produced using speech recognition software which may cause errors including grammar, punctuation, and spelling or words and phrases that may seem inappropriate. If there are questions or concerns please feel free to contact me for clarification.

## 2022-08-17 ENCOUNTER — HOSPITAL ENCOUNTER (OUTPATIENT)
Dept: CARDIOLOGY | Age: 82
Discharge: HOME | End: 2022-08-17
Payer: MEDICARE

## 2022-08-17 PROCEDURE — 93296 REM INTERROG EVL PM/IDS: CPT

## 2022-09-22 ENCOUNTER — OFFICE VISIT (OUTPATIENT)
Dept: PODIATRY | Age: 82
End: 2022-09-22
Payer: MEDICARE

## 2022-09-22 VITALS — BODY MASS INDEX: 33.29 KG/M2 | TEMPERATURE: 96.9 F | WEIGHT: 195 LBS | HEIGHT: 64 IN

## 2022-09-22 DIAGNOSIS — M79.674 PAIN IN TOES OF BOTH FEET: ICD-10-CM

## 2022-09-22 DIAGNOSIS — M79.675 PAIN IN TOES OF BOTH FEET: ICD-10-CM

## 2022-09-22 DIAGNOSIS — B35.1 DERMATOPHYTOSIS OF NAIL: Primary | ICD-10-CM

## 2022-09-22 DIAGNOSIS — R26.89 ANTALGIC GAIT: ICD-10-CM

## 2022-09-22 PROCEDURE — 99999 PR OFFICE/OUTPT VISIT,PROCEDURE ONLY: CPT | Performed by: PODIATRIST

## 2022-09-22 PROCEDURE — 11721 DEBRIDE NAIL 6 OR MORE: CPT | Performed by: PODIATRIST

## 2022-09-22 ASSESSMENT — ENCOUNTER SYMPTOMS
SHORTNESS OF BREATH: 0
VOMITING: 0
BACK PAIN: 1
NAUSEA: 0

## 2022-09-22 NOTE — PROGRESS NOTES
[address]  [address]  Dept: [phone]  Loc: [phone]       Mayra Zamudio  (1940)    9/22/22    Subjective     Mayra Zamudio is 78 y.o. female who complains today of:    Chief Complaint   Patient presents with    Nail Problem     Both feet       HPI: Patient presents with a complaint of painful toenails of both feet. Patient states she has difficulty performing self-care due to thickness and deformity of the nail plates. Patient states that as the nails are elongated she has increased discomfort when walking in enclosed shoes. Patient denies a history of diabetes. Review of Systems   Constitutional:  Negative for chills and fever. HENT:  Negative for hearing loss. Respiratory:  Negative for shortness of breath. Cardiovascular:  Negative for chest pain. Gastrointestinal:  Negative for nausea and vomiting. Genitourinary:  Negative for difficulty urinating. Musculoskeletal:  Positive for back pain and gait problem. Skin:  Negative for wound. Neurological:  Negative for numbness. Hematological:  Does not bruise/bleed easily. Psychiatric/Behavioral:  Negative for sleep disturbance. The patient is not a diabetic.    PCP:  Dr. Neo Blount   Date last seen: June 6, 2022    Allergies:  Tramadol    Current Outpatient Medications on File Prior to Visit   Medication Sig Dispense Refill    citalopram (CELEXA) 10 MG tablet Take 10 mg by mouth daily      omeprazole (PRILOSEC) 20 MG delayed release capsule       docusate (COLACE, DULCOLAX) 100 MG CAPS Take by mouth      carboxymethylcellulose 1 % ophthalmic solution Apply to eye      busPIRone (BUSPAR) 5 MG tablet TAKE 1 TABLET BY MOUTH TWICE DAILY      Vitamin E 100 units TABS Take by mouth      Multiple Vitamin (MULTI-VITAMIN DAILY) TABS Take by mouth      metoprolol succinate (TOPROL XL) 25 MG extended release tablet Take 1 tablet by mouth daily 30 tablet 3    acetaminophen (APAP EXTRA STRENGTH) 500 MG tablet Take 2 tablets by mouth every 6 hours as needed for Pain 60 tablet 1    amLODIPine (NORVASC) 5 MG tablet TAKE 1 TABLET BY MOUTH DAILY      SYNTHROID 112 MCG tablet Take 112 mcg by mouth Daily       atorvastatin (LIPITOR) 20 MG tablet TAKE 1 TABLET BY MOUTH DAILY AT BEDTIME      aspirin 81 MG chewable tablet Take 81 mg by mouth daily      nitroGLYCERIN (NITROSTAT) 0.4 MG SL tablet Place 1 tablet under the tongue every 5 minutes as needed for Chest pain up to max of 3 total doses. If no relief after 1 dose, call 911. 25 tablet 3    Multiple Vitamins-Minerals (PRESERVISION AREDS) CAPS Take by mouth daily      losartan (COZAAR) 100 MG tablet Take 100 mg by mouth daily. No current facility-administered medications on file prior to visit.        Past Medical History:   Diagnosis Date    Abdominal aortic aneurysm without rupture (HCC)     Age-related macular degeneration     Dehydration     Esophageal reflux     Falls frequently 01/02/2022    Hearing loss     High blood pressure     HIGH CHOLESTEROL     Hyperlipidemia     Hypothyroidism     Imbalance     Incontinence     Nausea     Obesity     Orthostatic dizziness     Osteoarthrosis     Overactive bladder     Thyroid disease      Past Surgical History:   Procedure Laterality Date    CATARACT REMOVAL      CHOLECYSTECTOMY      CHOLECYSTECTOMY      EYE SURGERY Bilateral     cataract    HYSTERECTOMY (CERVIX STATUS UNKNOWN)      IR NEUROSTIMULATOR PLACEMENT  07/28/2021    NERVE SURGERY N/A 7/28/2021    STAGE 1 & STAGE 2 INTERSTIM performed by Lien Buchanan MD at [place] [address]  01/2022     Social History     Socioeconomic History    Marital status:      Spouse name: Not on file    Number of children: Not on file    Years of education: Not on file    Highest education level: Not on file   Occupational History    Not on file Tobacco Use    Smoking status: Never    Smokeless tobacco: Never   Substance and Sexual Activity    Alcohol use: Yes     Comment: occasionally    Drug use: No    Sexual activity: Not on file   Other Topics Concern    Not on file   Social History Narrative    Not on file     Social Determinants of Health     Financial Resource Strain: Low Risk     Difficulty of Paying Living Expenses: Not hard at all   Food Insecurity: No Food Insecurity    Worried About Running Out of Food in the Last Year: Never true    Ran Out of Food in the Last Year: Never true   Transportation Needs: Not on file   Physical Activity: Not on file   Stress: Not on file   Social Connections: Not on file   Intimate Partner Violence: Not on file   Housing Stability: Not on file     History reviewed. No pertinent family history. Objective:   Vitals:  Temp 96.9 °F (36.1 °C)   Ht 5' 4\" (1.626 m)   Wt 195 lb (88.5 kg)   BMI 33.47 kg/m²        Physical Exam  Vitals reviewed. Constitutional:       Appearance: She is obese. HENT:      Head: Normocephalic and atraumatic. Cardiovascular:      Pulses:           Dorsalis pedis pulses are 2+ on the right side and 2+ on the left side. Posterior tibial pulses are 2+ on the right side and 2+ on the left side. Comments: Varicosities noted bilaterally. Telangiectasia noted bilaterally. Diminished hair growth noted bilaterally. Skin temperature gradient is warm to warm from proximal tibia to distal toes bilaterally. Pulmonary:      Effort: Pulmonary effort is normal.   Musculoskeletal:      Right lower le+ Pitting Edema present. Left lower le+ Pitting Edema present. Right foot: Decreased range of motion. Deformity and bunion present. Left foot: Decreased range of motion. Deformity and bunion present. Feet:      Right foot:      Protective Sensation: 10 sites tested. 10 sites sensed. Skin integrity: Callus present.       Toenail Condition: Right toenails are abnormally thick, long and ingrown. Fungal disease present. Left foot:      Protective Sensation: 10 sites tested. 10 sites sensed. Skin integrity: Skin integrity normal.      Toenail Condition: Left toenails are abnormally thick, long and ingrown. Fungal disease present. Comments: Planus foot type noted bilaterally, the right foot is abducted at the ankle. MMT graded 5/5 for all extrinsic foot muscle groups bilaterally. Decreased range of motion and stiffness noted with most joints of the foot and ankle bilaterally. Hallux abductovalgus deformity noted bilaterally. Flexion deformity noted to PIPJ 2-5 bilaterally. There is no tenderness palpation of the right medial malleolus over the right central metatarsals. Lymphadenopathy:      Comments: Popliteal lymph nodes are soft and nontender. Skin:     Capillary Refill: Capillary refill takes 2 to 3 seconds. Comments: No open lesions noted bilaterally. Normal skin turgor noted bilaterally. Normal skin texture noted bilaterally. Focal hyperkeratotic lesion noted to medial aspect of the right hallux. The toenails are elongated, incurvated, thickened, yellow in color, and there is subungual debris. Neurological:      General: No focal deficit present. Mental Status: She is alert and oriented to person, place, and time. Deep Tendon Reflexes: Babinski sign absent on the right side. Babinski sign absent on the left side. Reflex Scores:       Patellar reflexes are 2+ on the right side and 2+ on the left side. Achilles reflexes are 2+ on the right side and 2+ on the left side. Comments: No ankle clonus noted bilaterally. Psychiatric:         Mood and Affect: Mood normal.         Behavior: Behavior normal.       Assessment:      Diagnosis Orders   1. Dermatophytosis of nail  25293 - [place] DEBRIDEMENT OF NAILS, 6 OR MORE      2. Pain in toes of both feet  06707 - [place] DEBRIDEMENT OF NAILS, 6 OR MORE      3.  Antalgic gait 80180 - [place] DEBRIDEMENT OF NAILS, 6 OR MORE          Plan:       Onychomycosis: Nail plates 1-5 bilaterally were mechanically and electrically debrided in thickness and length to the level of normal underlying nail bed. The patient was instructed to attempt use of an emery board to maintain the length and thickness of their nails as best as possible if able. Call immediately should any problems arise. Orders Placed This Encounter   Procedures    95686 - [place] DEBRIDEMENT OF NAILS, 6 OR MORE           Follow up:  Return in about 9 weeks (around 11/24/2022). Gustavo Rivera DPM        Please note that this report has been partially produced using speech recognition software which may cause errors including grammar, punctuation, and spelling or words and phrases that may seem inappropriate. If there are questions or concerns please feel free to contact me for clarification.

## 2022-11-16 ENCOUNTER — HOSPITAL ENCOUNTER (OUTPATIENT)
Dept: CARDIOLOGY | Age: 82
Discharge: HOME | End: 2022-11-16
Payer: MEDICARE

## 2022-11-16 PROCEDURE — 93280 PM DEVICE PROGR EVAL DUAL: CPT

## 2022-11-28 ENCOUNTER — OFFICE VISIT (OUTPATIENT)
Dept: PODIATRY | Age: 82
End: 2022-11-28
Payer: MEDICARE

## 2022-11-28 VITALS — HEIGHT: 64 IN | WEIGHT: 195 LBS | TEMPERATURE: 97.5 F | BODY MASS INDEX: 33.29 KG/M2

## 2022-11-28 DIAGNOSIS — B35.1 DERMATOPHYTOSIS OF NAIL: Primary | ICD-10-CM

## 2022-11-28 DIAGNOSIS — R26.89 ANTALGIC GAIT: ICD-10-CM

## 2022-11-28 DIAGNOSIS — M79.674 PAIN IN TOES OF BOTH FEET: ICD-10-CM

## 2022-11-28 DIAGNOSIS — M79.675 PAIN IN TOES OF BOTH FEET: ICD-10-CM

## 2022-11-28 PROCEDURE — 99999 PR OFFICE/OUTPT VISIT,PROCEDURE ONLY: CPT | Performed by: PODIATRIST

## 2022-11-28 PROCEDURE — 11721 DEBRIDE NAIL 6 OR MORE: CPT | Performed by: PODIATRIST

## 2022-11-28 ASSESSMENT — ENCOUNTER SYMPTOMS
SHORTNESS OF BREATH: 0
VOMITING: 0
NAUSEA: 0
BACK PAIN: 1

## 2022-11-28 NOTE — PROGRESS NOTES
Vitamin (MULTI-VITAMIN DAILY) TABS Take by mouth      metoprolol succinate (TOPROL XL) 25 MG extended release tablet Take 1 tablet by mouth daily 30 tablet 3    acetaminophen (APAP EXTRA STRENGTH) 500 MG tablet Take 2 tablets by mouth every 6 hours as needed for Pain 60 tablet 1    amLODIPine (NORVASC) 5 MG tablet TAKE 1 TABLET BY MOUTH DAILY      SYNTHROID 112 MCG tablet Take 112 mcg by mouth Daily       atorvastatin (LIPITOR) 20 MG tablet TAKE 1 TABLET BY MOUTH DAILY AT BEDTIME      aspirin 81 MG chewable tablet Take 81 mg by mouth daily      nitroGLYCERIN (NITROSTAT) 0.4 MG SL tablet Place 1 tablet under the tongue every 5 minutes as needed for Chest pain up to max of 3 total doses. If no relief after 1 dose, call 911. 25 tablet 3    Multiple Vitamins-Minerals (PRESERVISION AREDS) CAPS Take by mouth daily      losartan (COZAAR) 100 MG tablet Take 100 mg by mouth daily. No current facility-administered medications on file prior to visit.        Past Medical History:   Diagnosis Date    Abdominal aortic aneurysm without rupture     Age-related macular degeneration     Dehydration     Esophageal reflux     Falls frequently 01/02/2022    Hearing loss     High blood pressure     HIGH CHOLESTEROL     Hyperlipidemia     Hypothyroidism     Imbalance     Incontinence     Nausea     Obesity     Orthostatic dizziness     Osteoarthrosis     Overactive bladder     Thyroid disease      Past Surgical History:   Procedure Laterality Date    CATARACT REMOVAL      CHOLECYSTECTOMY      CHOLECYSTECTOMY      EYE SURGERY Bilateral     cataract    HYSTERECTOMY (CERVIX STATUS UNKNOWN)      IR NEUROSTIMULATOR PLACEMENT  07/28/2021    NERVE SURGERY N/A 7/28/2021    STAGE 1 & STAGE 2 INTERSTIM performed by Jan Brandt MD at [place] [address]  01/2022     Social History     Socioeconomic History    Marital status:      Spouse name: Not on file    Number of children: Not on file    Years of education: Not on file    Highest education level: Not on file   Occupational History    Not on file   Tobacco Use    Smoking status: Never    Smokeless tobacco: Never   Substance and Sexual Activity    Alcohol use: Yes     Comment: occasionally    Drug use: No    Sexual activity: Not on file   Other Topics Concern    Not on file   Social History Narrative    Not on file     Social Determinants of Health     Financial Resource Strain: Low Risk     Difficulty of Paying Living Expenses: Not hard at all   Food Insecurity: No Food Insecurity    Worried About Running Out of Food in the Last Year: Never true    Ran Out of Food in the Last Year: Never true   Transportation Needs: Not on file   Physical Activity: Not on file   Stress: Not on file   Social Connections: Not on file   Intimate Partner Violence: Not on file   Housing Stability: Not on file     History reviewed. No pertinent family history. Objective:   Vitals:  Temp 97.5 °F (36.4 °C)   Ht 5' 4\" (1.626 m)   Wt 195 lb (88.5 kg)   BMI 33.47 kg/m² Pain Score:   2      Physical Exam  Vitals reviewed. Constitutional:       Appearance: She is obese. HENT:      Head: Normocephalic and atraumatic. Cardiovascular:      Pulses:           Dorsalis pedis pulses are 2+ on the right side and 2+ on the left side. Posterior tibial pulses are 2+ on the right side and 2+ on the left side. Comments: Varicosities noted bilaterally. Telangiectasia noted bilaterally. Diminished hair growth noted bilaterally. Skin temperature gradient is warm to warm from proximal tibia to distal toes bilaterally. Pulmonary:      Effort: Pulmonary effort is normal.   Musculoskeletal:      Right lower le+ Pitting Edema present. Left lower le+ Pitting Edema present. Right foot: Decreased range of motion. Deformity and bunion present. Left foot: Decreased range of motion. Deformity and bunion present.    Feet:      Right foot:      Protective Sensation: 10 sites tested. 10 sites sensed. Skin integrity: Callus present. Toenail Condition: Right toenails are abnormally thick, long and ingrown. Fungal disease present. Left foot:      Protective Sensation: 10 sites tested. 10 sites sensed. Skin integrity: Skin integrity normal.      Toenail Condition: Left toenails are abnormally thick, long and ingrown. Fungal disease present. Comments: Planus foot type noted bilaterally, the right foot is abducted at the ankle. MMT graded 5/5 for all extrinsic foot muscle groups bilaterally. Decreased range of motion and stiffness noted with most joints of the foot and ankle bilaterally. Hallux abductovalgus deformity noted bilaterally. Flexion deformity noted to PIPJ 2-5 bilaterally. There is no tenderness palpation of the right medial malleolus over the right central metatarsals. Lymphadenopathy:      Comments: Popliteal lymph nodes are soft and nontender. Skin:     Capillary Refill: Capillary refill takes 2 to 3 seconds. Comments: No open lesions noted bilaterally. Normal skin turgor noted bilaterally. Normal skin texture noted bilaterally. Focal hyperkeratotic lesion noted to medial aspect of the right hallux. The toenails are elongated, incurvated, thickened, yellow in color, and there is subungual debris. Neurological:      General: No focal deficit present. Mental Status: She is alert and oriented to person, place, and time. Deep Tendon Reflexes: Babinski sign absent on the right side. Babinski sign absent on the left side. Reflex Scores:       Patellar reflexes are 2+ on the right side and 2+ on the left side. Achilles reflexes are 2+ on the right side and 2+ on the left side. Comments: No ankle clonus noted bilaterally. Psychiatric:         Mood and Affect: Mood normal.         Behavior: Behavior normal.       Assessment:      Diagnosis Orders   1.  Dermatophytosis of nail  50955 - [place] DEBRIDEMENT OF NAILS, 6 OR MORE 2. Pain in toes of both feet  09860 - [place] DEBRIDEMENT OF NAILS, 6 OR MORE      3. Antalgic gait  19343 - [place] DEBRIDEMENT OF NAILS, 6 OR MORE          Plan:     Onychomycosis: Nails 1-5 bilaterally were mechanically and electrically debrided in thickness and length to the level of normal underlying nail bed. The patient was instructed to attempt use of an emery board to maintain the length and thickness of their nails as best as possible if able. We discussed restarting application of the topical antifungal solution, I explained the patient that she may begin doing that but I recommended that she apply it on a daily basis as intermittent application could lead to resistance. Patient instructed to call immediately should any problems arise. Orders Placed This Encounter   Procedures    49090 - [place] DEBRIDEMENT OF NAILS, 6 OR MORE           Follow up:  Return in about 9 weeks (around 1/30/2023). Sherie Kovacs DPM          Please note that this report has been partially produced using speech recognition software which may cause errors including grammar, punctuation, and spelling or words and phrases that may seem inappropriate. If there are questions or concerns please feel free to contact me for clarification.

## 2023-01-30 ENCOUNTER — OFFICE VISIT (OUTPATIENT)
Dept: PODIATRY | Age: 83
End: 2023-01-30
Payer: MEDICARE

## 2023-01-30 VITALS — WEIGHT: 195 LBS | TEMPERATURE: 97.1 F | BODY MASS INDEX: 33.29 KG/M2 | HEIGHT: 64 IN

## 2023-01-30 DIAGNOSIS — M79.675 PAIN IN TOES OF BOTH FEET: ICD-10-CM

## 2023-01-30 DIAGNOSIS — B35.1 DERMATOPHYTOSIS OF NAIL: Primary | ICD-10-CM

## 2023-01-30 DIAGNOSIS — M79.674 PAIN IN TOES OF BOTH FEET: ICD-10-CM

## 2023-01-30 DIAGNOSIS — R26.89 ANTALGIC GAIT: ICD-10-CM

## 2023-01-30 PROCEDURE — 11721 DEBRIDE NAIL 6 OR MORE: CPT | Performed by: PODIATRIST

## 2023-01-30 PROCEDURE — 99999 PR OFFICE/OUTPT VISIT,PROCEDURE ONLY: CPT | Performed by: PODIATRIST

## 2023-01-30 ASSESSMENT — ENCOUNTER SYMPTOMS
SHORTNESS OF BREATH: 0
BACK PAIN: 1
NAUSEA: 0
VOMITING: 0

## 2023-01-30 NOTE — PROGRESS NOTES
[facility], [facility]  [address]  [place] [address]  Dept: [phone]  Loc: [phone]       Hanane Gilliland  (1940)    1/30/23    Subjective     Hanane Gilliland is 82 y.o. female who complains today of:    Chief Complaint   Patient presents with    Nail Problem     Both feet       HPI:  The patient presents for evaluation and treatment of painful digital nail deformities.  The patient has been unable to provide self nail care due to the severe nature of deformity which is causing pressure and limiting their ability to walk in shoe gear without pain.  Self debridement has been attempted with no success. This is a chronic problem.     Review of Systems   Constitutional:  Negative for chills and fever.   HENT:  Positive for hearing loss.    Respiratory:  Negative for shortness of breath.    Cardiovascular:  Negative for chest pain.   Gastrointestinal:  Negative for nausea and vomiting.   Genitourinary:  Negative for difficulty urinating.   Musculoskeletal:  Positive for back pain. Negative for gait problem.   Skin:  Negative for wound.   Neurological:  Negative for numbness.   Hematological:  Does not bruise/bleed easily.   Psychiatric/Behavioral:  Negative for sleep disturbance.      The patient is not a diabetic.   PCP/ Endocrinologist: Dionicio Rousseau MD   Date last seen: 1/13/2023    Allergies:  Tramadol    Current Outpatient Medications on File Prior to Visit   Medication Sig Dispense Refill    citalopram (CELEXA) 10 MG tablet Take 10 mg by mouth daily      omeprazole (PRILOSEC) 20 MG delayed release capsule       docusate (COLACE, DULCOLAX) 100 MG CAPS Take by mouth      carboxymethylcellulose 1 % ophthalmic solution Apply to eye      busPIRone (BUSPAR) 5 MG tablet TAKE 1 TABLET BY MOUTH TWICE DAILY      Vitamin E 100 units TABS Take by mouth      Multiple Vitamin (MULTI-VITAMIN DAILY) TABS Take by mouth  metoprolol succinate (TOPROL XL) 25 MG extended release tablet Take 1 tablet by mouth daily 30 tablet 3    acetaminophen (APAP EXTRA STRENGTH) 500 MG tablet Take 2 tablets by mouth every 6 hours as needed for Pain 60 tablet 1    amLODIPine (NORVASC) 5 MG tablet TAKE 1 TABLET BY MOUTH DAILY      SYNTHROID 112 MCG tablet Take 112 mcg by mouth Daily       atorvastatin (LIPITOR) 20 MG tablet TAKE 1 TABLET BY MOUTH DAILY AT BEDTIME      aspirin 81 MG chewable tablet Take 81 mg by mouth daily      nitroGLYCERIN (NITROSTAT) 0.4 MG SL tablet Place 1 tablet under the tongue every 5 minutes as needed for Chest pain up to max of 3 total doses. If no relief after 1 dose, call 911. 25 tablet 3    Multiple Vitamins-Minerals (PRESERVISION AREDS) CAPS Take by mouth daily      losartan (COZAAR) 100 MG tablet Take 100 mg by mouth daily. No current facility-administered medications on file prior to visit.        Past Medical History:   Diagnosis Date    Abdominal aortic aneurysm without rupture     Age-related macular degeneration     Dehydration     Esophageal reflux     Falls frequently 01/02/2022    Hearing loss     High blood pressure     HIGH CHOLESTEROL     Hyperlipidemia     Hypothyroidism     Imbalance     Incontinence     Nausea     Obesity     Orthostatic dizziness     Osteoarthrosis     Overactive bladder     Thyroid disease      Past Surgical History:   Procedure Laterality Date    CATARACT REMOVAL      CHOLECYSTECTOMY      CHOLECYSTECTOMY      EYE SURGERY Bilateral     cataract    HYSTERECTOMY (CERVIX STATUS UNKNOWN)      IR NEUROSTIMULATOR PLACEMENT  07/28/2021    NERVE SURGERY N/A 7/28/2021    STAGE 1 & STAGE 2 INTERSTIM performed by Alecia Mayer MD at [place] [address]  01/2022     Social History     Socioeconomic History    Marital status:      Spouse name: Not on file    Number of children: Not on file    Years of education: Not on file    Highest education level: Not on file Occupational History    Not on file   Tobacco Use    Smoking status: Never    Smokeless tobacco: Never   Substance and Sexual Activity    Alcohol use: Yes     Comment: occasionally    Drug use: No    Sexual activity: Not on file   Other Topics Concern    Not on file   Social History Narrative    Not on file     Social Determinants of Health     Financial Resource Strain: Low Risk     Difficulty of Paying Living Expenses: Not hard at all   Food Insecurity: No Food Insecurity    Worried About Running Out of Food in the Last Year: Never true    Ran Out of Food in the Last Year: Never true   Transportation Needs: Not on file   Physical Activity: Not on file   Stress: Not on file   Social Connections: Not on file   Intimate Partner Violence: Not on file   Housing Stability: Not on file     History reviewed. No pertinent family history. Objective:   Vitals:  Temp 97.1 °F (36.2 °C)   Ht 5' 4\" (1.626 m)   Wt 195 lb (88.5 kg)   BMI 33.47 kg/m²        Physical Exam  Vitals reviewed. Constitutional:       Appearance: She is obese. HENT:      Head: Normocephalic and atraumatic. Cardiovascular:      Pulses:           Dorsalis pedis pulses are 2+ on the right side and 2+ on the left side. Posterior tibial pulses are 2+ on the right side and 2+ on the left side. Comments: Varicosities noted bilaterally. Telangiectasia noted bilaterally. Diminished hair growth noted bilaterally. Skin temperature gradient is warm to warm from proximal tibia to distal toes bilaterally. Pulmonary:      Effort: Pulmonary effort is normal.   Musculoskeletal:      Right lower le+ Pitting Edema present. Left lower le+ Pitting Edema present. Right foot: Decreased range of motion. Deformity and bunion present. Left foot: Decreased range of motion. Deformity and bunion present. Feet:      Right foot:      Protective Sensation: 10 sites tested. 10 sites sensed. Skin integrity: Callus present. Toenail Condition: Right toenails are abnormally thick, long and ingrown. Fungal disease present. Left foot:      Protective Sensation: 10 sites tested. 10 sites sensed. Skin integrity: Skin integrity normal.      Toenail Condition: Left toenails are abnormally thick, long and ingrown. Fungal disease present. Comments: Planus foot type noted bilaterally, the right foot is abducted at the ankle. MMT graded 5/5 for all extrinsic foot muscle groups bilaterally. Decreased range of motion and stiffness noted with most joints of the foot and ankle bilaterally. Hallux abductovalgus deformity noted bilaterally. Flexion deformity noted to PIPJ 2-5 bilaterally. There is no tenderness palpation of the right medial malleolus over the right central metatarsals. Lymphadenopathy:      Comments: Popliteal lymph nodes are soft and nontender. Skin:     Capillary Refill: Capillary refill takes 2 to 3 seconds. Comments: No open lesions noted bilaterally. Normal skin turgor noted bilaterally. Normal skin texture noted bilaterally. Focal hyperkeratotic lesion noted to medial aspect of the right hallux. The toenails are elongated, incurvated, thickened, yellow in color, and there is subungual debris. Neurological:      General: No focal deficit present. Mental Status: She is alert and oriented to person, place, and time. Deep Tendon Reflexes: Babinski sign absent on the right side. Babinski sign absent on the left side. Reflex Scores:       Patellar reflexes are 2+ on the right side and 2+ on the left side. Achilles reflexes are 2+ on the right side and 2+ on the left side. Comments: No ankle clonus noted bilaterally. Psychiatric:         Mood and Affect: Mood normal.         Behavior: Behavior normal.       Assessment:      Diagnosis Orders   1. Dermatophytosis of nail  60320 - [place] DEBRIDEMENT OF NAILS, 6 OR MORE      2.  Pain in toes of both feet  26794 - [place] DEBRIDEMENT OF NAILS, 6 OR MORE      3. Antalgic gait  78857 - [place] DEBRIDEMENT OF NAILS, 6 OR MORE          Plan:     Onychomycosis: Nail plates 1-5 bilateral foot were mechanically and electrically debrided in thickness and length to the level of normal underlying nail bed. The patient was instructed to attempt use of an emery board to maintain the length and thickness of their nails as best as possible if able. Call immediately should any problems arise. Orders Placed This Encounter   Procedures    76490 - [place] DEBRIDEMENT OF NAILS, 6 OR MORE           Follow up:  Return in about 9 weeks (around 4/3/2023). Karri Skinner DPM    Please note that this report has been partially produced using speech recognition software which may cause errors including grammar, punctuation, and spelling or words and phrases that may seem inappropriate. If there are questions or concerns please feel free to contact me for clarification.

## 2023-02-15 ENCOUNTER — HOSPITAL ENCOUNTER (OUTPATIENT)
Dept: CARDIOLOGY | Age: 83
Discharge: HOME | End: 2023-02-15
Payer: MEDICARE

## 2023-02-15 PROCEDURE — 93296 REM INTERROG EVL PM/IDS: CPT

## 2023-04-03 ENCOUNTER — OFFICE VISIT (OUTPATIENT)
Dept: PODIATRY | Age: 83
End: 2023-04-03
Payer: MEDICARE

## 2023-04-03 VITALS — TEMPERATURE: 97.1 F | HEIGHT: 64 IN | BODY MASS INDEX: 33.29 KG/M2 | WEIGHT: 195 LBS

## 2023-04-03 DIAGNOSIS — M79.675 PAIN IN TOES OF BOTH FEET: ICD-10-CM

## 2023-04-03 DIAGNOSIS — M79.674 PAIN IN TOES OF BOTH FEET: ICD-10-CM

## 2023-04-03 DIAGNOSIS — B35.1 DERMATOPHYTOSIS OF NAIL: Primary | ICD-10-CM

## 2023-04-03 PROCEDURE — 99999 PR OFFICE/OUTPT VISIT,PROCEDURE ONLY: CPT | Performed by: PODIATRIST

## 2023-04-03 PROCEDURE — 11721 DEBRIDE NAIL 6 OR MORE: CPT | Performed by: PODIATRIST

## 2023-04-03 ASSESSMENT — ENCOUNTER SYMPTOMS
VOMITING: 0
NAUSEA: 0
BACK PAIN: 1
SHORTNESS OF BREATH: 0

## 2023-05-16 ENCOUNTER — HOSPITAL ENCOUNTER (OUTPATIENT)
Dept: CARDIOLOGY | Age: 83
Discharge: HOME | End: 2023-05-16
Payer: MEDICARE

## 2023-05-16 PROCEDURE — 93280 PM DEVICE PROGR EVAL DUAL: CPT

## 2023-06-05 ENCOUNTER — OFFICE VISIT (OUTPATIENT)
Dept: PODIATRY | Age: 83
End: 2023-06-05
Payer: MEDICARE

## 2023-06-05 VITALS — HEIGHT: 64 IN | TEMPERATURE: 97.4 F | BODY MASS INDEX: 33.29 KG/M2 | WEIGHT: 195 LBS

## 2023-06-05 DIAGNOSIS — M79.674 PAIN IN TOES OF BOTH FEET: ICD-10-CM

## 2023-06-05 DIAGNOSIS — R26.89 ANTALGIC GAIT: ICD-10-CM

## 2023-06-05 DIAGNOSIS — B35.1 DERMATOPHYTOSIS OF NAIL: Primary | ICD-10-CM

## 2023-06-05 DIAGNOSIS — M79.675 PAIN IN TOES OF BOTH FEET: ICD-10-CM

## 2023-06-05 PROCEDURE — 99999 PR OFFICE/OUTPT VISIT,PROCEDURE ONLY: CPT | Performed by: PODIATRIST

## 2023-06-05 PROCEDURE — 11721 DEBRIDE NAIL 6 OR MORE: CPT | Performed by: PODIATRIST

## 2023-06-05 ASSESSMENT — ENCOUNTER SYMPTOMS
VOMITING: 0
SHORTNESS OF BREATH: 0
BACK PAIN: 1
NAUSEA: 0

## 2023-06-05 NOTE — PROGRESS NOTES
sensed. Skin integrity: Skin integrity normal.      Toenail Condition: Left toenails are abnormally thick, long and ingrown. Fungal disease present. Comments: Planus foot type noted bilaterally, the right foot is abducted at the ankle. MMT graded 5/5 for all extrinsic foot muscle groups bilaterally. Decreased range of motion and stiffness noted with most joints of the foot and ankle bilaterally. Hallux abductovalgus deformity noted bilaterally. Flexion deformity noted to PIPJ 2-5 bilaterally. There is no tenderness palpation of the right medial malleolus over the right central metatarsals. Lymphadenopathy:      Comments: Popliteal lymph nodes are soft and nontender. Skin:     Capillary Refill: Capillary refill takes 2 to 3 seconds. Comments: No open lesions noted bilaterally. Normal skin turgor noted bilaterally. Normal skin texture noted bilaterally. Focal hyperkeratotic lesion noted to medial aspect of the right hallux. The toenails are elongated, incurvated, thickened, yellow in color, and there is subungual debris. Neurological:      General: No focal deficit present. Mental Status: She is alert and oriented to person, place, and time. Deep Tendon Reflexes: Babinski sign absent on the right side. Babinski sign absent on the left side. Reflex Scores:       Patellar reflexes are 2+ on the right side and 2+ on the left side. Achilles reflexes are 2+ on the right side and 2+ on the left side. Comments: No ankle clonus noted bilaterally. Psychiatric:         Mood and Affect: Mood normal.         Behavior: Behavior normal.       Assessment:      Diagnosis Orders   1. Dermatophytosis of nail  71592 - [place] DEBRIDEMENT OF NAILS, 6 OR MORE      2. Pain in toes of both feet  96052 - [place] DEBRIDEMENT OF NAILS, 6 OR MORE      3.  Antalgic gait  73126 - [place] DEBRIDEMENT OF NAILS, 6 OR MORE          Plan:     Onychomycosis: Nail plates 1-5 bilateral foot were mechanically and

## 2023-08-07 ENCOUNTER — OFFICE VISIT (OUTPATIENT)
Dept: PODIATRY | Age: 83
End: 2023-08-07
Payer: MEDICARE

## 2023-08-07 VITALS — WEIGHT: 195 LBS | BODY MASS INDEX: 33.29 KG/M2 | TEMPERATURE: 97.1 F | HEIGHT: 64 IN

## 2023-08-07 DIAGNOSIS — M79.675 PAIN IN TOES OF BOTH FEET: ICD-10-CM

## 2023-08-07 DIAGNOSIS — M79.674 PAIN IN TOES OF BOTH FEET: ICD-10-CM

## 2023-08-07 DIAGNOSIS — R26.89 ANTALGIC GAIT: ICD-10-CM

## 2023-08-07 DIAGNOSIS — B35.1 DERMATOPHYTOSIS OF NAIL: Primary | ICD-10-CM

## 2023-08-07 PROCEDURE — 99999 PR OFFICE/OUTPT VISIT,PROCEDURE ONLY: CPT | Performed by: PODIATRIST

## 2023-08-07 PROCEDURE — 11721 DEBRIDE NAIL 6 OR MORE: CPT | Performed by: PODIATRIST

## 2023-08-07 ASSESSMENT — ENCOUNTER SYMPTOMS
NAUSEA: 0
VOMITING: 0
BACK PAIN: 1
SHORTNESS OF BREATH: 0

## 2023-08-07 NOTE — PROGRESS NOTES
long and ingrown. Fungal disease present. Comments: Planus foot type noted bilaterally, the right foot is abducted at the ankle. MMT graded 5/5 for all extrinsic foot muscle groups bilaterally. Decreased range of motion and stiffness noted with most joints of the foot and ankle bilaterally. Hallux abductovalgus deformity noted bilaterally. Flexion deformity noted to PIPJ 2-5 bilaterally. There is no tenderness palpation of the right medial malleolus over the right central metatarsals. Lymphadenopathy:      Comments: Popliteal lymph nodes are soft and nontender. Skin:     Capillary Refill: Capillary refill takes 2 to 3 seconds. Comments: No open lesions noted bilaterally. Normal skin turgor noted bilaterally. Normal skin texture noted bilaterally. Focal hyperkeratotic lesion noted to medial aspect of the right hallux. The toenails are elongated, incurvated, thickened, yellow in color, and there is subungual debris. Neurological:      General: No focal deficit present. Mental Status: She is alert and oriented to person, place, and time. Deep Tendon Reflexes: Babinski sign absent on the right side. Babinski sign absent on the left side. Reflex Scores:       Patellar reflexes are 2+ on the right side and 2+ on the left side. Achilles reflexes are 2+ on the right side and 2+ on the left side. Comments: No ankle clonus noted bilaterally. Psychiatric:         Mood and Affect: Mood normal.         Behavior: Behavior normal.       Assessment:      Diagnosis Orders   1. Dermatophytosis of nail  57055 - [place] DEBRIDEMENT OF NAILS, 6 OR MORE      2. Pain in toes of both feet  18868 - [place] DEBRIDEMENT OF NAILS, 6 OR MORE      3. Antalgic gait  65571 - [place] DEBRIDEMENT OF NAILS, 6 OR MORE          Plan:     Onychomycosis: Nails 1-5 bilaterally were mechanically and electrically debrided in thickness and length to the level of normal underlying nail bed.   The patient was instructed to

## 2023-08-15 ENCOUNTER — HOSPITAL ENCOUNTER (OUTPATIENT)
Dept: CARDIOLOGY | Age: 83
Discharge: HOME | End: 2023-08-15
Payer: MEDICARE

## 2023-08-15 PROCEDURE — 93296 REM INTERROG EVL PM/IDS: CPT

## 2023-09-23 PROBLEM — G47.33 OBSTRUCTIVE SLEEP APNEA, ADULT: Status: ACTIVE | Noted: 2023-09-23

## 2023-09-23 PROBLEM — Z78.0 ASYMPTOMATIC MENOPAUSE: Status: ACTIVE | Noted: 2023-09-23

## 2023-09-23 PROBLEM — I10 HTN (HYPERTENSION): Status: ACTIVE | Noted: 2023-09-23

## 2023-09-23 PROBLEM — F34.1 PRIMARY DYSTHYMIA: Status: ACTIVE | Noted: 2023-09-23

## 2023-09-23 PROBLEM — I49.5 SINUS NODE DYSFUNCTION (MULTI): Status: ACTIVE | Noted: 2023-09-23

## 2023-09-23 PROBLEM — E78.5 HYPERLIPIDEMIA: Status: ACTIVE | Noted: 2023-09-23

## 2023-09-23 PROBLEM — I65.23 BILATERAL CAROTID ARTERY STENOSIS: Status: ACTIVE | Noted: 2023-09-23

## 2023-09-23 PROBLEM — R55 SYNCOPE AND COLLAPSE: Status: ACTIVE | Noted: 2023-09-23

## 2023-09-23 PROBLEM — M19.90 DJD (DEGENERATIVE JOINT DISEASE): Status: ACTIVE | Noted: 2023-09-23

## 2023-09-23 PROBLEM — R94.30 ABNORMAL RESULTS OF CARDIOVASCULAR FUNCTION STUDIES: Status: ACTIVE | Noted: 2023-09-23

## 2023-09-23 PROBLEM — R94.31 ABNORMAL ELECTROCARDIOGRAM: Status: ACTIVE | Noted: 2023-09-23

## 2023-09-23 PROBLEM — H91.93 HEARING LOSS, BILATERAL: Status: ACTIVE | Noted: 2023-09-23

## 2023-09-23 PROBLEM — R09.89 BILATERAL CAROTID BRUITS: Status: ACTIVE | Noted: 2023-09-23

## 2023-09-23 PROBLEM — I25.10 CAD (CORONARY ARTERY DISEASE): Status: ACTIVE | Noted: 2023-09-23

## 2023-09-23 PROBLEM — E03.9 HYPOTHYROIDISM: Status: ACTIVE | Noted: 2023-09-23

## 2023-09-23 PROBLEM — R13.10 DYSPHAGIA: Status: ACTIVE | Noted: 2023-09-23

## 2023-09-23 PROBLEM — I83.90 VARICOSE VEIN OF LEG: Status: ACTIVE | Noted: 2023-09-23

## 2023-09-23 PROBLEM — I71.40 AAA (ABDOMINAL AORTIC ANEURYSM) (CMS-HCC): Status: ACTIVE | Noted: 2023-09-23

## 2023-09-23 PROBLEM — E66.811 CLASS 1 OBESITY WITH BODY MASS INDEX (BMI) OF 33.0 TO 33.9 IN ADULT: Status: ACTIVE | Noted: 2023-09-23

## 2023-09-23 PROBLEM — R94.39 ABNORMAL STRESS TEST: Status: ACTIVE | Noted: 2023-09-23

## 2023-09-23 PROBLEM — Z95.0 CARDIAC PACEMAKER IN SITU: Status: ACTIVE | Noted: 2023-09-23

## 2023-09-23 PROBLEM — R55 SYNCOPE AND COLLAPSE: Status: RESOLVED | Noted: 2023-09-23 | Resolved: 2023-09-23

## 2023-09-23 PROBLEM — R06.09 DOE (DYSPNEA ON EXERTION): Status: ACTIVE | Noted: 2023-09-23

## 2023-09-23 PROBLEM — I44.2 IDIOVENTRICULAR RHYTHM (MULTI): Status: ACTIVE | Noted: 2023-09-23

## 2023-09-23 PROBLEM — H90.6 MIXED CONDUCTIVE AND SENSORINEURAL HEARING LOSS OF BOTH EARS: Status: ACTIVE | Noted: 2023-09-23

## 2023-09-23 PROBLEM — E66.9 CLASS 1 OBESITY WITH BODY MASS INDEX (BMI) OF 33.0 TO 33.9 IN ADULT: Status: ACTIVE | Noted: 2023-09-23

## 2023-09-23 RX ORDER — OXYBUTYNIN CHLORIDE 10 MG/1
10 TABLET, EXTENDED RELEASE ORAL
COMMUNITY
End: 2023-10-23

## 2023-09-23 RX ORDER — LOSARTAN POTASSIUM 100 MG/1
50 TABLET ORAL DAILY
COMMUNITY
Start: 2022-10-18 | End: 2023-10-23 | Stop reason: DRUGHIGH

## 2023-09-23 RX ORDER — LEVOTHYROXINE SODIUM 112 UG/1
1 TABLET ORAL DAILY
COMMUNITY

## 2023-09-23 RX ORDER — OMEPRAZOLE 20 MG/1
1 TABLET, DELAYED RELEASE ORAL DAILY
COMMUNITY
Start: 2022-04-11

## 2023-09-23 RX ORDER — ATORVASTATIN CALCIUM 20 MG/1
80 TABLET, FILM COATED ORAL DAILY
COMMUNITY
Start: 2021-05-26 | End: 2023-10-23 | Stop reason: SDUPTHER

## 2023-09-23 RX ORDER — CITALOPRAM 10 MG/1
1 TABLET ORAL DAILY
COMMUNITY

## 2023-09-23 RX ORDER — ACETAMINOPHEN 500 MG/1
500 CAPSULE, LIQUID FILLED ORAL AS NEEDED
COMMUNITY

## 2023-09-23 RX ORDER — SPIRONOLACTONE 25 MG/1
1 TABLET ORAL DAILY
COMMUNITY
Start: 2022-10-18 | End: 2023-10-23 | Stop reason: SDUPTHER

## 2023-09-23 RX ORDER — BUSPIRONE HYDROCHLORIDE 5 MG/1
5 TABLET ORAL AS NEEDED
COMMUNITY
Start: 2022-02-09 | End: 2023-10-23

## 2023-09-23 RX ORDER — AMLODIPINE BESYLATE 5 MG/1
1 TABLET ORAL DAILY
COMMUNITY
Start: 2021-07-20 | End: 2023-10-23 | Stop reason: SDUPTHER

## 2023-09-23 RX ORDER — NAPROXEN SODIUM 220 MG/1
81 TABLET, FILM COATED ORAL
COMMUNITY

## 2023-09-23 RX ORDER — AMLODIPINE BESYLATE 2.5 MG/1
2.5 TABLET ORAL
COMMUNITY
End: 2023-10-23

## 2023-09-23 RX ORDER — ACETAMINOPHEN 500 MG
50 TABLET ORAL
COMMUNITY
Start: 2022-05-12

## 2023-09-23 RX ORDER — METOPROLOL SUCCINATE 25 MG/1
50 TABLET, EXTENDED RELEASE ORAL
COMMUNITY
End: 2023-10-23 | Stop reason: SDUPTHER

## 2023-09-23 RX ORDER — NITROGLYCERIN 0.4 MG/1
0.4 TABLET SUBLINGUAL EVERY 5 MIN PRN
COMMUNITY
Start: 2021-05-26 | End: 2023-10-23 | Stop reason: SDUPTHER

## 2023-10-03 ENCOUNTER — TELEPHONE (OUTPATIENT)
Dept: CARDIOLOGY | Facility: CLINIC | Age: 83
End: 2023-10-03
Payer: MEDICARE

## 2023-10-03 NOTE — TELEPHONE ENCOUNTER
Pt left message on Rx line stating that she needs her medications transferred to a different pharmacy.  Per pt [facility] will longer fill her medication that are from a non [facility] provider.  Pt did not leave name of new pharmacy or which medications she needs sent in.      Please follow up with pt.

## 2023-10-23 ENCOUNTER — OFFICE VISIT (OUTPATIENT)
Dept: CARDIOLOGY | Facility: CLINIC | Age: 83
End: 2023-10-23
Payer: MEDICARE

## 2023-10-23 VITALS — DIASTOLIC BLOOD PRESSURE: 56 MMHG | SYSTOLIC BLOOD PRESSURE: 103 MMHG

## 2023-10-23 DIAGNOSIS — I44.2 IDIOVENTRICULAR RHYTHM (MULTI): ICD-10-CM

## 2023-10-23 DIAGNOSIS — R09.89 BILATERAL CAROTID BRUITS: ICD-10-CM

## 2023-10-23 DIAGNOSIS — I10 PRIMARY HYPERTENSION: ICD-10-CM

## 2023-10-23 DIAGNOSIS — I49.5 SINUS NODE DYSFUNCTION (MULTI): ICD-10-CM

## 2023-10-23 DIAGNOSIS — E66.09 CLASS 1 OBESITY DUE TO EXCESS CALORIES WITHOUT SERIOUS COMORBIDITY WITH BODY MASS INDEX (BMI) OF 33.0 TO 33.9 IN ADULT: ICD-10-CM

## 2023-10-23 DIAGNOSIS — E02 SUBCLINICAL IODINE-DEFICIENCY HYPOTHYROIDISM: ICD-10-CM

## 2023-10-23 DIAGNOSIS — I71.30 RUPTURED ABDOMINAL AORTIC ANEURYSM (AAA), UNSPECIFIED PART (MULTI): Primary | ICD-10-CM

## 2023-10-23 DIAGNOSIS — G47.33 OBSTRUCTIVE SLEEP APNEA, ADULT: ICD-10-CM

## 2023-10-23 DIAGNOSIS — M15.8 OTHER OSTEOARTHRITIS INVOLVING MULTIPLE JOINTS: ICD-10-CM

## 2023-10-23 DIAGNOSIS — R94.31 ABNORMAL ELECTROCARDIOGRAM: ICD-10-CM

## 2023-10-23 DIAGNOSIS — I83.001 VARICOSE VEINS OF LOWER EXTREMITY WITH ULCER OF THIGH LIMITED TO BREAKDOWN OF SKIN, UNSPECIFIED LATERALITY (MULTI): ICD-10-CM

## 2023-10-23 DIAGNOSIS — I65.23 BILATERAL CAROTID ARTERY STENOSIS: ICD-10-CM

## 2023-10-23 DIAGNOSIS — I25.10 CORONARY ARTERY DISEASE INVOLVING NATIVE CORONARY ARTERY OF NATIVE HEART WITHOUT ANGINA PECTORIS: ICD-10-CM

## 2023-10-23 DIAGNOSIS — R94.30 ABNORMAL RESULTS OF CARDIOVASCULAR FUNCTION STUDIES: ICD-10-CM

## 2023-10-23 DIAGNOSIS — R06.09 DOE (DYSPNEA ON EXERTION): ICD-10-CM

## 2023-10-23 DIAGNOSIS — Z95.0 CARDIAC PACEMAKER IN SITU: ICD-10-CM

## 2023-10-23 DIAGNOSIS — L97.101 VARICOSE VEINS OF LOWER EXTREMITY WITH ULCER OF THIGH LIMITED TO BREAKDOWN OF SKIN, UNSPECIFIED LATERALITY (MULTI): ICD-10-CM

## 2023-10-23 DIAGNOSIS — E78.2 MIXED HYPERLIPIDEMIA: ICD-10-CM

## 2023-10-23 DIAGNOSIS — R94.39 ABNORMAL STRESS TEST: ICD-10-CM

## 2023-10-23 PROCEDURE — 1160F RVW MEDS BY RX/DR IN RCRD: CPT | Performed by: INTERNAL MEDICINE

## 2023-10-23 PROCEDURE — 3078F DIAST BP <80 MM HG: CPT | Performed by: INTERNAL MEDICINE

## 2023-10-23 PROCEDURE — 99214 OFFICE O/P EST MOD 30 MIN: CPT | Performed by: INTERNAL MEDICINE

## 2023-10-23 PROCEDURE — 1159F MED LIST DOCD IN RCRD: CPT | Performed by: INTERNAL MEDICINE

## 2023-10-23 PROCEDURE — 3074F SYST BP LT 130 MM HG: CPT | Performed by: INTERNAL MEDICINE

## 2023-10-23 RX ORDER — NITROGLYCERIN 0.4 MG/1
TABLET SUBLINGUAL
Qty: 25 TABLET | Refills: 5 | Status: SHIPPED | OUTPATIENT
Start: 2023-10-23

## 2023-10-23 RX ORDER — AMLODIPINE BESYLATE 5 MG/1
5 TABLET ORAL DAILY
Qty: 90 TABLET | Refills: 3 | Status: SHIPPED | OUTPATIENT
Start: 2023-10-23

## 2023-10-23 RX ORDER — SPIRONOLACTONE 25 MG/1
25 TABLET ORAL DAILY
Qty: 90 TABLET | Refills: 3 | Status: SHIPPED | OUTPATIENT
Start: 2023-10-23

## 2023-10-23 RX ORDER — METOPROLOL SUCCINATE 25 MG/1
TABLET, EXTENDED RELEASE ORAL
Qty: 90 TABLET | Refills: 3 | Status: SHIPPED
Start: 2023-10-23 | End: 2023-11-01 | Stop reason: ENTERED-IN-ERROR

## 2023-10-23 RX ORDER — ATORVASTATIN CALCIUM 80 MG/1
80 TABLET, FILM COATED ORAL DAILY
Qty: 90 TABLET | Refills: 3 | Status: SHIPPED | OUTPATIENT
Start: 2023-10-23

## 2023-10-23 RX ORDER — LOSARTAN POTASSIUM 50 MG/1
50 TABLET ORAL DAILY
Qty: 90 TABLET | Refills: 3 | Status: SHIPPED | OUTPATIENT
Start: 2023-10-23 | End: 2023-11-13 | Stop reason: SDUPTHER

## 2023-10-23 ASSESSMENT — ENCOUNTER SYMPTOMS
DEPRESSION: 0
OCCASIONAL FEELINGS OF UNSTEADINESS: 0
LOSS OF SENSATION IN FEET: 0

## 2023-10-23 NOTE — PROGRESS NOTES
CARDIOLOGY OFFICE NOTE    Date:   10/23/2023    Patient:    Jessica Mann    YOB: 1940    Primary Physician: Elias Shukla MD       Reason for Visit: 4 mo FU       HPI:     Jessica Mann was seen in cardiac evaluation at the  Cardiology office October 23, 2023.      The patients problems are listed as in the impression above.    Electronic medical records reviewed.    Patient returns.  6-month follow-up.    Medications adjusted for hypertension.  She feels better.  She has no complaints currently.    Abdominal ultrasound 723 was reviewed and was without abdominal aortic aneurysm and with the aorta measured 2.1 x 2.6 cm.    Patient denies Chest Pain, SOB, Lightheadedness, Dizziness, TIA or CVA symptoms.  No CHF or Edema.  No Palpitations.  No GI,  or Bleeding Issues. No Recent Fever or Chills.     Cardiovascular and general review of systems is otherwise negative.    A 14-system review is otherwise negative, other than noted.     .  IMPRESSION:      Cardiovascular status stable  Fatigue  Bradycardia, resolved  Sick sinus syndrome, post [employer] preserved XT DR MRI January 2022.  Abnormal resting electrocardiogram (first-degree AV block, poor with ventricular progression, left axis deviation).  Negative cardiac catheterization for significant coronary artery disease 12/6/2019.  Abnormal Lexiscan Myoview perfusion stress test for mild anterior lateral apical ischemia, November 2019.  Abdominal aortic aneurysm, follow-up abdominal ultrasound 7/2023 measured 2.1 x 2.6 cm.  Prior negative stress test 1996.  Prior negative echocardiogram 1996 and November 2019.  Negative carotid ultrasound for significant carotid artery disease, ICAs less than 50%, November 2019.  History of varicose veins.  Obstructive sleep apnea not on CPAP.  Hypertension  Hyperlipidemia  Lymphedema  Peptic ulcer disease  Degenerative joint disease with bilateral knee discomfort  Prior hysterectomy  Prior  cholecystectomy  Recurrent falls  Covid pneumonia, January 2020.  Family history of cardiovascular (congestive heart failure, coronary bypass, pacemaker).  Otherwise as per assessment below.    RECOMMENDATIONS:      Patient was reassured.  She will continue her current medications.  Refills were provided.    Exercise dietary program was encouraged.  Hydration.    [employer] portal use was encouraged.    We will plan to see back in 6 months with Laboratory Studies and ECG as noted below.     Patient will follow up with their primary physician for general care.    The patient knows to contact medical care earlier if need be.      ALLERGIES:     Allergies   Allergen Reactions    Tramadol Unknown        MEDICATIONS:     Current Outpatient Medications   Medication Instructions    acetaminophen (TYLENOL) 500 mg, oral, As needed    amLODIPine (Norvasc) 5 mg tablet 1 tablet, oral, Daily    amLODIPine (NORVASC) 2.5 mg, oral    aspirin 81 mg, oral, Daily RT    atorvastatin (Lipitor) 20 mg tablet 1 tablet, oral, Nightly    busPIRone (BUSPAR) 5 mg, oral, As needed    cholecalciferol (VITAMIN D-3) 50 mcg, oral    citalopram (CeleXA) 10 mg tablet 1 tablet, oral, Daily    levothyroxine (Synthroid, Levoxyl) 112 mcg tablet 1 tablet, oral, Daily    losartan (Cozaar) 100 mg tablet 0.5 tablets, oral, 2 times daily    metoprolol succinate XL (TOPROL-XL) 25 mg, oral, 2 times daily    MULTIVITAMIN ORAL 1 tablet, oral, Daily    nitroglycerin (NITROSTAT) 0.4 mg, sublingual, Every 5 min PRN    omeprazole OTC (PriLOSEC OTC) 20 mg EC tablet 1 tablet, oral, Daily    oxybutynin XL (DITROPAN-XL) 10 mg, oral    spironolactone (Aldactone) 25 mg tablet 1 tablet, oral, Daily    vit A/vit C/vit E/zinc/copper (PRESERVISION AREDS ORAL) 1 capsule, oral, Daily       ELECTROCARDIOGRAM:      None    CARDIAC TESTING:      Abdominal ultrasound as noted above.    LABORATORY DATA:      Prior as noted below.    All above testing was personally reviewed.    PHYSICAL  "EXAMINATION:      General: No acute distress. Vital signs as noted. Alert and oriented.  Head And Neck Examination: No jugular venous distention, no carotid bruits, no mass. Carotid upstrokes preserved. Oral mucosa moist. No xanthelasma. Head and neck examination otherwise unremarkable.  Lungs: Clear to auscultation and percussion. No wheezes, no rales, and no rhonchi.  Chest: Excursion appeared to be normal. No chest wall tenderness on palpation.  Heart: Normal S1 and S2. No S3. No S4. No rub. Grade 1/6 systolic murmur, best heard at the left sternal border. Point of maximal impulse was within normal limits. Pacemaker left chest.  Abdomen: Soft. Nontender. No organomegaly. No bruits. No masses. Obese.  Extremities: Trace bipedal edema. No clubbing. No cyanosis. Pulses are strong throughout. No bruits.  Musculoskeletal Exam: No ulcers, otherwise unremarkable.  Neuro: Neurologically appeared grossly intact.       LAB:     CBC:   Lab Results   Component Value Date    WBC 6.7 04/28/2021    RBC 4.89 04/28/2021    HGB 12.9 04/28/2021    HCT 42.8 04/28/2021     04/28/2021        CMP:    Lab Results   Component Value Date     04/28/2021    K 3.9 04/28/2021     04/28/2021    CO2 30 04/28/2021    BUN 14 04/28/2021    CREATININE 0.52 04/28/2021    GLUCOSE 96 04/28/2021    CALCIUM 9.0 04/28/2021       Magnesium:    Lab Results   Component Value Date    MG 2.20 04/28/2021       Lipid Profile:    Lab Results   Component Value Date    TRIG 128 04/28/2021    HDL 48.0 04/28/2021       Hepatic Function Panel:    Lab Results   Component Value Date    ALKPHOS 85 04/28/2021    ALT 16 04/28/2021    AST 16 04/28/2021    PROT 6.5 04/28/2021    BILITOT 0.6 04/28/2021    BILIDIR 0.1 04/28/2021       TSH:    Lab Results   Component Value Date    TSH 2.13 06/09/2022       HgBA1c:    No results found for: \"HGBA1C\"    BNP:   No results found for: \"BNP\"     PT/INR:    No results found for: \"PROTIME\", \"INR\"    Cardiac Enzymes:  " "  No results found for: \"TROPHS\"    DIAGNOSTIC.:     Electrocardiogram 12 Lead    Result Date: 2/16/2023  Sinus rhythm with 1st degree AV block Left ventricular hypertrophy with repolarization abnormality Abnormal ECG No previous ECGs available Confirmed by SWAPNIL TONEY MD ([id number]) on 9/8/2019 7:50:03 AM    XR KNEE 3 VIEWS BILATERAL    Result Date: 9/6/2019  MRN: [id number] Patient Name: CONOR CABRERA  STUDY: BILATERAL KNEE; 3 VIEWS;; 9/6/2019 4:23 pm  INDICATION: fall.  COMPARISON: None.  ACCESSION NUMBER(S): [id number]  ORDERING CLINICIAN: JENNIFER CARDOZO  FINDINGS: Three views of the right and left knee. No visible acute fractures or dislocations. Faint chondrocalcinosis is evident on the left. No joint effusions. No periosteal reaction or cortical erosive changes. Mild cartilage loss with small osteophytes on both sides.  IMPRESSION: Mild degenerative changes. No visible acute fractures or dislocations.      XR CHEST 1 VIEW    Result Date: 9/6/2019  MRN: [id number] Patient Name: CONOR CABRERA  STUDY: CHEST 1 VIEW; 9/6/2019 4:23 pm  INDICATION: weak.  COMPARISON: None.  ACCESSION NUMBER(S): [id number]  ORDERING CLINICIAN: JENNIFER CARDOZO  FINDINGS: A single AP portable radiograph of the chest was obtained.  No focal infiltrate, pleural effusion or pneumothorax is identified. The cardiac silhouette is within normal limits for size.  IMPRESSION: No focal infiltrate or pneumothorax is identified.      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    ----

## 2023-10-30 DIAGNOSIS — I10 PRIMARY HYPERTENSION: ICD-10-CM

## 2023-10-30 NOTE — TELEPHONE ENCOUNTER
Pharmacy calls in requesting clarification on Metoprolol states that the pt's PCP had sent a refill for 50mg daily and they have received an rx for Metoprolol 25mg from Dr. Malik Leonard MD, FACC. Please advise of which dose. Kavon

## 2023-11-01 RX ORDER — METOPROLOL SUCCINATE 25 MG/1
TABLET, EXTENDED RELEASE ORAL
Qty: 180 TABLET | Refills: 2 | Status: SHIPPED | OUTPATIENT
Start: 2023-11-01 | End: 2023-11-03 | Stop reason: SDUPTHER

## 2023-11-01 NOTE — TELEPHONE ENCOUNTER
11/1/2023  Received call from Mirna in follow up to rx clarification.  She instructs to submit new rx for Metoprolol Succinate 25 mg twice a day.  Rx order placed in chart.  Jacklyn Sheppard, CMA

## 2023-11-03 NOTE — ADDENDUM NOTE
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

## 2023-11-07 RX ORDER — METOPROLOL SUCCINATE 25 MG/1
TABLET, EXTENDED RELEASE ORAL
Qty: 180 TABLET | Refills: 2 | Status: SHIPPED | OUTPATIENT
Start: 2023-11-07

## 2023-11-13 ENCOUNTER — OFFICE VISIT (OUTPATIENT)
Dept: CARDIOLOGY | Facility: CLINIC | Age: 83
End: 2023-11-13
Payer: MEDICARE

## 2023-11-13 VITALS
DIASTOLIC BLOOD PRESSURE: 62 MMHG | HEIGHT: 64 IN | HEART RATE: 63 BPM | BODY MASS INDEX: 35.85 KG/M2 | SYSTOLIC BLOOD PRESSURE: 102 MMHG | WEIGHT: 210 LBS

## 2023-11-13 DIAGNOSIS — R94.39 ABNORMAL STRESS TEST: ICD-10-CM

## 2023-11-13 DIAGNOSIS — E02 SUBCLINICAL IODINE-DEFICIENCY HYPOTHYROIDISM: ICD-10-CM

## 2023-11-13 DIAGNOSIS — R94.31 ABNORMAL ELECTROCARDIOGRAM: ICD-10-CM

## 2023-11-13 DIAGNOSIS — I49.5 SINUS NODE DYSFUNCTION (MULTI): ICD-10-CM

## 2023-11-13 DIAGNOSIS — I10 PRIMARY HYPERTENSION: ICD-10-CM

## 2023-11-13 DIAGNOSIS — R06.09 DOE (DYSPNEA ON EXERTION): ICD-10-CM

## 2023-11-13 DIAGNOSIS — I65.23 BILATERAL CAROTID ARTERY STENOSIS: ICD-10-CM

## 2023-11-13 DIAGNOSIS — I25.10 CORONARY ARTERY DISEASE INVOLVING NATIVE CORONARY ARTERY OF NATIVE HEART WITHOUT ANGINA PECTORIS: ICD-10-CM

## 2023-11-13 DIAGNOSIS — M15.8 OTHER OSTEOARTHRITIS INVOLVING MULTIPLE JOINTS: ICD-10-CM

## 2023-11-13 DIAGNOSIS — G47.33 OBSTRUCTIVE SLEEP APNEA, ADULT: ICD-10-CM

## 2023-11-13 DIAGNOSIS — I44.2 IDIOVENTRICULAR RHYTHM (MULTI): ICD-10-CM

## 2023-11-13 DIAGNOSIS — Z95.0 CARDIAC PACEMAKER IN SITU: ICD-10-CM

## 2023-11-13 DIAGNOSIS — R94.30 ABNORMAL RESULTS OF CARDIOVASCULAR FUNCTION STUDIES: ICD-10-CM

## 2023-11-13 DIAGNOSIS — E66.09 CLASS 1 OBESITY DUE TO EXCESS CALORIES WITHOUT SERIOUS COMORBIDITY WITH BODY MASS INDEX (BMI) OF 33.0 TO 33.9 IN ADULT: ICD-10-CM

## 2023-11-13 DIAGNOSIS — I71.30 RUPTURED ABDOMINAL AORTIC ANEURYSM (AAA), UNSPECIFIED PART (MULTI): ICD-10-CM

## 2023-11-13 DIAGNOSIS — L97.101 VARICOSE VEINS OF LOWER EXTREMITY WITH ULCER OF THIGH LIMITED TO BREAKDOWN OF SKIN, UNSPECIFIED LATERALITY (MULTI): ICD-10-CM

## 2023-11-13 DIAGNOSIS — I83.001 VARICOSE VEINS OF LOWER EXTREMITY WITH ULCER OF THIGH LIMITED TO BREAKDOWN OF SKIN, UNSPECIFIED LATERALITY (MULTI): ICD-10-CM

## 2023-11-13 DIAGNOSIS — R09.89 BILATERAL CAROTID BRUITS: ICD-10-CM

## 2023-11-13 DIAGNOSIS — E78.2 MIXED HYPERLIPIDEMIA: ICD-10-CM

## 2023-11-13 PROCEDURE — 3078F DIAST BP <80 MM HG: CPT | Performed by: NURSE PRACTITIONER

## 2023-11-13 PROCEDURE — 1159F MED LIST DOCD IN RCRD: CPT | Performed by: NURSE PRACTITIONER

## 2023-11-13 PROCEDURE — 1160F RVW MEDS BY RX/DR IN RCRD: CPT | Performed by: NURSE PRACTITIONER

## 2023-11-13 PROCEDURE — 1036F TOBACCO NON-USER: CPT | Performed by: NURSE PRACTITIONER

## 2023-11-13 PROCEDURE — 99213 OFFICE O/P EST LOW 20 MIN: CPT | Performed by: NURSE PRACTITIONER

## 2023-11-13 PROCEDURE — 3074F SYST BP LT 130 MM HG: CPT | Performed by: NURSE PRACTITIONER

## 2023-11-13 RX ORDER — LOSARTAN POTASSIUM 50 MG/1
25 TABLET ORAL DAILY
Qty: 90 TABLET | Refills: 0 | Status: SHIPPED | OUTPATIENT
Start: 2023-11-13 | End: 2024-11-12

## 2023-11-13 NOTE — PROGRESS NOTES
Jessica Mann is a 83 y.o. female that presents to the office today for  cardiac follow up.  She follows with Dr. Juan and her primary cardiologist Dr. Leonard  and was added to my schedule today for a Dr. Juan 6 month follow up.  Per Dr. Juan office notes, she has a PMH of bradycardia and also recurrent episodes of falling. Patient was admitted in January 2022 at [facility] after not feeling well for the last 2 to 3 weeks due to cold-flu symptoms. She was diagnosed of Covid-19 pneumonia. During this admission, patient was having significant amount of nausea and vomiting associated with syncopal episodes with evidence and documentation of significant pauses up to 15 seconds of duration on telemetry monitoring. A temporary pacer wire was placed.    History of hypertension, hyperlipidemia, normal left ventricular function per echocardiogram in 2019 and also no evidence of coronary artery disease per cardiac catheterization in 2018. Patient also had documentation of accelerated junctional rhythm by Holter monitor 2018.    Patient underwent implantation of a dual-chamber pacemaker in January 11/2022 with no complications. 3 days later she went back again to the electrophysiology 30 for repositioning of the right atrial lead secondary to spontaneous dislodgment.    Echocardiogram in November 2023 shows left ventricular ejection fraction of 50 to 55% with mild left ventricular hypertrophy. TSH in January 2023 shows 3.3.    Overall she states that she has been doing well other than fatigue.  She does state that she has not been wearing her CPAP machine for some time which may be a direct refection of her fatigue.  She has been advised to resume use of her CPAP machine as directed.  She is also noted to be borderline hypotensive for which medication will be adjusted today.      Testing Reviewed  Device interrogation 8/15/2023 showed battery longevity 1.2, pacing atrial 76.8 %, ventricle pacing 99.4%.   Atrial fib burden < 0.1%,  No ventral arrhythmia.    Assessment  Dual-chamber pacemaker implanted in January 2022,  Borderline hypotensive   Normal left ventricular function per echocardiogram as described above   No significant coronary artery disease per cardiac catheterization described above    Plan  Stop losartan 50 mg 1 tablet daily  Start losartan 25 mg 1 tablet daily.  May take 1/2 of 50 mg tablet  Pacemaker checks with device clinic as scheduled  Resume CPAP machine as previously directed.   Follow with Dr. Juan in 6 months or sooner if needed.     Patient Active Problem List   Diagnosis    AAA (abdominal aortic aneurysm) (CMS/HCC)    Abnormal electrocardiogram    Abnormal results of cardiovascular function studies    Abnormal stress test    Bilateral carotid artery stenosis    Bilateral carotid bruits    CAD (coronary artery disease)    Cardiac pacemaker in situ    DJD (degenerative joint disease)    Dysphagia    Hearing loss, bilateral    HTN (hypertension)    Hyperlipidemia    Hypothyroidism    Idioventricular rhythm (CMS/HCC)    Mixed conductive and sensorineural hearing loss of both ears    Obstructive sleep apnea, adult    Primary dysthymia    Sinus node dysfunction (CMS/HCC)    TERRY (dyspnea on exertion)    Varicose vein of leg    Asymptomatic menopause    Class 1 obesity with body mass index (BMI) of 33.0 to 33.9 in adult       Social History     Tobacco Use    Smoking status: Never     Passive exposure: Never    Smokeless tobacco: Never   Substance Use Topics    Alcohol use: Not Currently    Drug use: Never       History reviewed. No pertinent past medical history.      Current Outpatient Medications:     acetaminophen (Tylenol) 500 mg capsule, Take 1 capsule (500 mg) by mouth if needed., Disp: , Rfl:     amLODIPine (Norvasc) 5 mg tablet, Take 1 tablet (5 mg) by mouth once daily., Disp: 90 tablet, Rfl: 3    aspirin 81 mg chewable tablet, Chew 1 tablet (81 mg) once daily., Disp: , Rfl:      atorvastatin (Lipitor) 80 mg tablet, Take 1 tablet (80 mg) by mouth once daily., Disp: 90 tablet, Rfl: 3    cholecalciferol (Vitamin D-3) 50 mcg (2,000 unit) capsule, Take 1 capsule (50 mcg) by mouth., Disp: , Rfl:     citalopram (CeleXA) 10 mg tablet, Take 1 tablet (10 mg) by mouth once daily., Disp: , Rfl:     levothyroxine (Synthroid, Levoxyl) 112 mcg tablet, Take 1 tablet (112 mcg) by mouth once daily., Disp: , Rfl:     metoprolol succinate XL (Toprol-XL) 25 mg 24 hr tablet, 1 tablet twice a day, Disp: 180 tablet, Rfl: 2    MULTIVITAMIN ORAL, Take 1 tablet by mouth once daily., Disp: , Rfl:     nitroglycerin (Nitrostat) 0.4 mg SL tablet, Take as directed as needed for chest pain. May repeat every 5 min. X3.  Call 911 if 3rd dose is needed, Disp: 25 tablet, Rfl: 5    omeprazole OTC (PriLOSEC OTC) 20 mg EC tablet, Take 1 tablet (20 mg) by mouth once daily., Disp: , Rfl:     spironolactone (Aldactone) 25 mg tablet, Take 1 tablet (25 mg) by mouth once daily., Disp: 90 tablet, Rfl: 3    vit A/vit C/vit E/zinc/copper (PRESERVISION AREDS ORAL), Take 1 capsule by mouth once daily., Disp: , Rfl:     losartan (Cozaar) 50 mg tablet, Take 0.5 tablets (25 mg) by mouth once daily., Disp: 90 tablet, Rfl: 0    Tramadol    Family History   Problem Relation Name Age of Onset    Other (Cardiac disorder) Sister      Stroke Sister      Heart failure Sister      Diabetes Sister      Other (Cardiac disorder) Brother      Heart failure Brother         Past Surgical History:   Procedure Laterality Date    OTHER SURGICAL HISTORY  11/11/2021    Cataract surgery    OTHER SURGICAL HISTORY  11/11/2021    Cardiac catheterization    OTHER SURGICAL HISTORY  11/11/2021    Cholecystectomy    OTHER SURGICAL HISTORY  11/11/2021    Hysterectomy    OTHER SURGICAL HISTORY  11/11/2021    Colonoscopy    OTHER SURGICAL HISTORY  02/16/2022    Pacemaker insertion          Review of systems  Constitutional: No weight loss, fever, chills, weakness.  Positive  "for fatigue  HEENT: No visual loss, blurred vision, double vision or yellow sclerae  Skin: No rash or itching  Cardiovascular: No chest pain, pressure or discomfort, No palpitations or edema.  Respiratory: No shortness of breath, cough or sputum  Gastrointestinal: No nausea, vomiting or diarrhea. No bloody or dark tarry stools.  Neurological: No headache, lightheadedness, dizziness, syncope.   Musculoskeletal: No muscle, back pain, joint pain or stiffness.  Hematologic: No anemia, bleeding or bruising.    /62 (BP Location: Right arm, Patient Position: Sitting)   Pulse 63   Ht 1.626 m (5' 4\")   Wt 95.3 kg (210 lb)   BMI 36.05 kg/m²     Patient Active Problem List   Diagnosis    AAA (abdominal aortic aneurysm) (CMS/HCC)    Abnormal electrocardiogram    Abnormal results of cardiovascular function studies    Abnormal stress test    Bilateral carotid artery stenosis    Bilateral carotid bruits    CAD (coronary artery disease)    Cardiac pacemaker in situ    DJD (degenerative joint disease)    Dysphagia    Hearing loss, bilateral    HTN (hypertension)    Hyperlipidemia    Hypothyroidism    Idioventricular rhythm (CMS/HCC)    Mixed conductive and sensorineural hearing loss of both ears    Obstructive sleep apnea, adult    Primary dysthymia    Sinus node dysfunction (CMS/HCC)    TERRY (dyspnea on exertion)    Varicose vein of leg    Asymptomatic menopause    Class 1 obesity with body mass index (BMI) of 33.0 to 33.9 in adult       Physical Exam  Constitutional: Well developed, awake/alert x 3, no distress.  Head/Neck: No JVD, No bruits  Respiratory/Thorax: patent airways, CTAB, normal breath sounds with good expansion.  Cardiovascular: Regular rate and rhythm, no murmurs, normal S1 and S2,   Gastrointestinal: Non distended, soft, non-tender, no rebound tenderness or guarding.  Extremities: No cyanosis, edema.    Neurological: Alert and oriented x 3. Moves extremities spontaneous with purpose.  Psychological: " Appropriate mood and behavior  Skin: Warm and Dry. No lesions or rashes.         Please excuse any errors in grammar or translation related to dictation, voice recognition software was used to prepare this document.

## 2023-11-26 ENCOUNTER — APPOINTMENT (OUTPATIENT)
Dept: CT IMAGING | Age: 83
End: 2023-11-26
Payer: MEDICARE

## 2023-11-26 ENCOUNTER — APPOINTMENT (OUTPATIENT)
Dept: GENERAL RADIOLOGY | Age: 83
End: 2023-11-26
Payer: MEDICARE

## 2023-11-26 ENCOUNTER — APPOINTMENT (OUTPATIENT)
Dept: ULTRASOUND IMAGING | Age: 83
End: 2023-11-26
Payer: MEDICARE

## 2023-11-26 ENCOUNTER — HOSPITAL ENCOUNTER (OUTPATIENT)
Age: 83
Setting detail: OBSERVATION
Discharge: HOME | End: 2023-11-28
Attending: EMERGENCY MEDICINE
Payer: MEDICARE

## 2023-11-26 DIAGNOSIS — R55 SYNCOPE AND COLLAPSE: ICD-10-CM

## 2023-11-26 DIAGNOSIS — R26.89 IMBALANCE: ICD-10-CM

## 2023-11-26 DIAGNOSIS — I10 ESSENTIAL (PRIMARY) HYPERTENSION: ICD-10-CM

## 2023-11-26 DIAGNOSIS — M79.604 PAIN OF RIGHT LOWER EXTREMITY: ICD-10-CM

## 2023-11-26 DIAGNOSIS — N30.00 ACUTE CYSTITIS WITHOUT HEMATURIA: Primary | ICD-10-CM

## 2023-11-26 PROBLEM — N39.0 UTI (URINARY TRACT INFECTION): Status: ACTIVE | Noted: 2023-11-26

## 2023-11-26 LAB
ANION GAP SERPL CALCULATED.3IONS-SCNC: 12 MEQ/L (ref 9–15)
BACTERIA URNS QL MICRO: ABNORMAL /HPF
BASOPHILS # BLD: 0.1 K/UL (ref 0–0.2)
BASOPHILS NFR BLD: 0.4 %
BILIRUB UR QL STRIP: NEGATIVE
BUN SERPL-MCNC: 20 MG/DL (ref 8–23)
CALCIUM SERPL-MCNC: 9.3 MG/DL (ref 8.5–9.9)
CHLORIDE SERPL-SCNC: 102 MEQ/L (ref 95–107)
CHP ED QC CHECK: YES
CLARITY UR: ABNORMAL
CO2 SERPL-SCNC: 24 MEQ/L (ref 20–31)
COLOR UR: YELLOW
CREAT SERPL-MCNC: 0.7 MG/DL (ref 0.5–0.9)
EOSINOPHIL # BLD: 0 K/UL (ref 0–0.7)
EOSINOPHIL NFR BLD: 0 %
EPI CELLS #/AREA URNS AUTO: ABNORMAL /HPF (ref 0–5)
ERYTHROCYTE [DISTWIDTH] IN BLOOD BY AUTOMATED COUNT: 13.5 % (ref 11.5–14.5)
GLUCOSE BLD-MCNC: 139 MG/DL
GLUCOSE BLD-MCNC: 139 MG/DL (ref 70–99)
GLUCOSE SERPL-MCNC: 142 MG/DL (ref 70–99)
GLUCOSE UR STRIP-MCNC: NEGATIVE MG/DL
HCT VFR BLD AUTO: 38.2 % (ref 37–47)
HGB BLD-MCNC: 11.8 G/DL (ref 12–16)
HGB UR QL STRIP: ABNORMAL
HYALINE CASTS #/AREA URNS AUTO: ABNORMAL /HPF (ref 0–5)
KETONES UR STRIP-MCNC: NEGATIVE MG/DL
LEUKOCYTE ESTERASE UR QL STRIP: ABNORMAL
LYMPHOCYTES # BLD: 1.1 K/UL (ref 1–4.8)
LYMPHOCYTES NFR BLD: 7.8 %
MCH RBC QN AUTO: 26.9 PG (ref 27–31.3)
MCHC RBC AUTO-ENTMCNC: 30.9 % (ref 33–37)
MCV RBC AUTO: 87 FL (ref 79.4–94.8)
MONOCYTES # BLD: 1.4 K/UL (ref 0.2–0.8)
MONOCYTES NFR BLD: 10.4 %
NEUTROPHILS # BLD: 11.3 K/UL (ref 1.4–6.5)
NEUTS SEG NFR BLD: 81 %
NITRITE UR QL STRIP: POSITIVE
PERFORMED ON: ABNORMAL
PH UR STRIP: 5.5 [PH] (ref 5–9)
PLATELET # BLD AUTO: 360 K/UL (ref 130–400)
POTASSIUM SERPL-SCNC: 5 MEQ/L (ref 3.4–4.9)
PROT UR STRIP-MCNC: NEGATIVE MG/DL
RBC # BLD AUTO: 4.39 M/UL (ref 4.2–5.4)
RBC #/AREA URNS AUTO: ABNORMAL /HPF (ref 0–5)
SODIUM SERPL-SCNC: 138 MEQ/L (ref 135–144)
SP GR UR STRIP: 1.02 (ref 1–1.03)
URINE REFLEX TO CULTURE: YES
UROBILINOGEN UR STRIP-ACNC: 0.2 E.U./DL
WBC # BLD AUTO: 13.9 K/UL (ref 4.8–10.8)
WBC #/AREA URNS AUTO: ABNORMAL /HPF (ref 0–5)

## 2023-11-26 PROCEDURE — 87086 URINE CULTURE/COLONY COUNT: CPT

## 2023-11-26 PROCEDURE — 71045 X-RAY EXAM CHEST 1 VIEW: CPT

## 2023-11-26 PROCEDURE — 93005 ELECTROCARDIOGRAM TRACING: CPT | Performed by: EMERGENCY MEDICINE

## 2023-11-26 PROCEDURE — G0378 HOSPITAL OBSERVATION PER HR: HCPCS

## 2023-11-26 PROCEDURE — 2580000003 HC RX 258: Performed by: INTERNAL MEDICINE

## 2023-11-26 PROCEDURE — 2580000003 HC RX 258: Performed by: EMERGENCY MEDICINE

## 2023-11-26 PROCEDURE — 87186 SC STD MICRODIL/AGAR DIL: CPT

## 2023-11-26 PROCEDURE — 51701 INSERT BLADDER CATHETER: CPT

## 2023-11-26 PROCEDURE — 96365 THER/PROPH/DIAG IV INF INIT: CPT

## 2023-11-26 PROCEDURE — 73552 X-RAY EXAM OF FEMUR 2/>: CPT

## 2023-11-26 PROCEDURE — 93971 EXTREMITY STUDY: CPT

## 2023-11-26 PROCEDURE — 6370000000 HC RX 637 (ALT 250 FOR IP): Performed by: INTERNAL MEDICINE

## 2023-11-26 PROCEDURE — 73502 X-RAY EXAM HIP UNI 2-3 VIEWS: CPT

## 2023-11-26 PROCEDURE — 99285 EMERGENCY DEPT VISIT HI MDM: CPT

## 2023-11-26 PROCEDURE — 36415 COLL VENOUS BLD VENIPUNCTURE: CPT

## 2023-11-26 PROCEDURE — 70450 CT HEAD/BRAIN W/O DYE: CPT

## 2023-11-26 PROCEDURE — 80048 BASIC METABOLIC PNL TOTAL CA: CPT

## 2023-11-26 PROCEDURE — 99222 1ST HOSP IP/OBS MODERATE 55: CPT | Performed by: ORTHOPAEDIC SURGERY

## 2023-11-26 PROCEDURE — 87088 URINE BACTERIA CULTURE: CPT

## 2023-11-26 PROCEDURE — 96361 HYDRATE IV INFUSION ADD-ON: CPT

## 2023-11-26 PROCEDURE — 72100 X-RAY EXAM L-S SPINE 2/3 VWS: CPT

## 2023-11-26 PROCEDURE — 85025 COMPLETE CBC W/AUTO DIFF WBC: CPT

## 2023-11-26 PROCEDURE — 6360000002 HC RX W HCPCS: Performed by: EMERGENCY MEDICINE

## 2023-11-26 PROCEDURE — 81001 URINALYSIS AUTO W/SCOPE: CPT

## 2023-11-26 RX ORDER — SODIUM CHLORIDE 0.9 % (FLUSH) 0.9 %
10 SYRINGE (ML) INJECTION EVERY 12 HOURS SCHEDULED
Status: DISCONTINUED | OUTPATIENT
Start: 2023-11-26 | End: 2023-11-28 | Stop reason: HOSPADM

## 2023-11-26 RX ORDER — BUSPIRONE HYDROCHLORIDE 5 MG/1
5 TABLET ORAL 2 TIMES DAILY
Status: DISCONTINUED | OUTPATIENT
Start: 2023-11-26 | End: 2023-11-26

## 2023-11-26 RX ORDER — POLYETHYLENE GLYCOL 3350 17 G/17G
17 POWDER, FOR SOLUTION ORAL DAILY PRN
Status: DISCONTINUED | OUTPATIENT
Start: 2023-11-26 | End: 2023-11-28 | Stop reason: HOSPADM

## 2023-11-26 RX ORDER — AMLODIPINE BESYLATE 5 MG/1
5 TABLET ORAL DAILY
Status: DISCONTINUED | OUTPATIENT
Start: 2023-11-26 | End: 2023-11-28 | Stop reason: HOSPADM

## 2023-11-26 RX ORDER — PANTOPRAZOLE SODIUM 40 MG/1
40 TABLET, DELAYED RELEASE ORAL
Status: DISCONTINUED | OUTPATIENT
Start: 2023-11-27 | End: 2023-11-28 | Stop reason: HOSPADM

## 2023-11-26 RX ORDER — ACETAMINOPHEN 325 MG/1
650 TABLET ORAL EVERY 6 HOURS PRN
Status: DISCONTINUED | OUTPATIENT
Start: 2023-11-26 | End: 2023-11-27

## 2023-11-26 RX ORDER — SODIUM CHLORIDE 9 MG/ML
INJECTION, SOLUTION INTRAVENOUS PRN
Status: DISCONTINUED | OUTPATIENT
Start: 2023-11-26 | End: 2023-11-28 | Stop reason: HOSPADM

## 2023-11-26 RX ORDER — SODIUM CHLORIDE 9 MG/ML
INJECTION, SOLUTION INTRAVENOUS CONTINUOUS
Status: DISPENSED | OUTPATIENT
Start: 2023-11-26 | End: 2023-11-26

## 2023-11-26 RX ORDER — ASPIRIN 81 MG/1
81 TABLET, CHEWABLE ORAL DAILY
Status: DISCONTINUED | OUTPATIENT
Start: 2023-11-26 | End: 2023-11-28 | Stop reason: HOSPADM

## 2023-11-26 RX ORDER — ATORVASTATIN CALCIUM 20 MG/1
20 TABLET, FILM COATED ORAL NIGHTLY
Status: DISCONTINUED | OUTPATIENT
Start: 2023-11-26 | End: 2023-11-28 | Stop reason: HOSPADM

## 2023-11-26 RX ORDER — ACETAMINOPHEN 650 MG/1
650 SUPPOSITORY RECTAL EVERY 6 HOURS PRN
Status: DISCONTINUED | OUTPATIENT
Start: 2023-11-26 | End: 2023-11-27

## 2023-11-26 RX ORDER — ENOXAPARIN SODIUM 100 MG/ML
40 INJECTION SUBCUTANEOUS DAILY
Status: DISCONTINUED | OUTPATIENT
Start: 2023-11-27 | End: 2023-11-28 | Stop reason: HOSPADM

## 2023-11-26 RX ORDER — SPIRONOLACTONE 25 MG/1
25 TABLET ORAL DAILY
COMMUNITY

## 2023-11-26 RX ORDER — CARBOXYMETHYLCELLULOSE SODIUM 5 MG/ML
1 SOLUTION/ DROPS OPHTHALMIC 3 TIMES DAILY
Status: DISCONTINUED | OUTPATIENT
Start: 2023-11-26 | End: 2023-11-28 | Stop reason: HOSPADM

## 2023-11-26 RX ORDER — LEVOTHYROXINE SODIUM 112 UG/1
112 TABLET ORAL DAILY
Status: DISCONTINUED | OUTPATIENT
Start: 2023-11-26 | End: 2023-11-28 | Stop reason: HOSPADM

## 2023-11-26 RX ORDER — METOPROLOL SUCCINATE 25 MG/1
25 TABLET, EXTENDED RELEASE ORAL DAILY
Status: DISCONTINUED | OUTPATIENT
Start: 2023-11-26 | End: 2023-11-28 | Stop reason: HOSPADM

## 2023-11-26 RX ORDER — CITALOPRAM HYDROBROMIDE 10 MG/1
10 TABLET ORAL DAILY
Status: DISCONTINUED | OUTPATIENT
Start: 2023-11-26 | End: 2023-11-28 | Stop reason: HOSPADM

## 2023-11-26 RX ORDER — SODIUM CHLORIDE 0.9 % (FLUSH) 0.9 %
10 SYRINGE (ML) INJECTION PRN
Status: DISCONTINUED | OUTPATIENT
Start: 2023-11-26 | End: 2023-11-28 | Stop reason: HOSPADM

## 2023-11-26 RX ORDER — LOSARTAN POTASSIUM 50 MG/1
50 TABLET ORAL 2 TIMES DAILY
Status: DISCONTINUED | OUTPATIENT
Start: 2023-11-26 | End: 2023-11-28 | Stop reason: HOSPADM

## 2023-11-26 RX ADMIN — CARBOXYMETHYLCELLULOSE SODIUM 1 DROP: 0.5 SOLUTION/ DROPS OPHTHALMIC at 14:27

## 2023-11-26 RX ADMIN — ATORVASTATIN CALCIUM 20 MG: 20 TABLET, FILM COATED ORAL at 21:02

## 2023-11-26 RX ADMIN — CITALOPRAM HYDROBROMIDE 10 MG: 10 TABLET ORAL at 14:24

## 2023-11-26 RX ADMIN — ASPIRIN 81 MG: 81 TABLET, CHEWABLE ORAL at 14:23

## 2023-11-26 RX ADMIN — Medication 10 ML: at 09:59

## 2023-11-26 RX ADMIN — ACETAMINOPHEN 650 MG: 325 TABLET ORAL at 21:02

## 2023-11-26 RX ADMIN — LEVOTHYROXINE SODIUM 112 MCG: 0.11 TABLET ORAL at 14:23

## 2023-11-26 RX ADMIN — SODIUM CHLORIDE: 9 INJECTION, SOLUTION INTRAVENOUS at 09:59

## 2023-11-26 RX ADMIN — CEFTRIAXONE SODIUM 1000 MG: 1 INJECTION, POWDER, FOR SOLUTION INTRAMUSCULAR; INTRAVENOUS at 06:26

## 2023-11-26 RX ADMIN — AMLODIPINE BESYLATE 5 MG: 5 TABLET ORAL at 14:24

## 2023-11-26 RX ADMIN — METOPROLOL SUCCINATE 25 MG: 25 TABLET, EXTENDED RELEASE ORAL at 14:24

## 2023-11-26 RX ADMIN — LOSARTAN POTASSIUM 50 MG: 50 TABLET, FILM COATED ORAL at 21:02

## 2023-11-26 RX ADMIN — Medication 10 ML: at 21:03

## 2023-11-26 ASSESSMENT — ENCOUNTER SYMPTOMS
VOMITING: 0
SHORTNESS OF BREATH: 0

## 2023-11-26 ASSESSMENT — LIFESTYLE VARIABLES
HOW OFTEN DO YOU HAVE A DRINK CONTAINING ALCOHOL: NEVER
HOW MANY STANDARD DRINKS CONTAINING ALCOHOL DO YOU HAVE ON A TYPICAL DAY: PATIENT DOES NOT DRINK

## 2023-11-26 ASSESSMENT — PAIN SCALES - GENERAL: PAINLEVEL_OUTOF10: 2

## 2023-11-26 NOTE — ED TRIAGE NOTES
Patient to ED via [id number] [facility]. Patient from home alone. Patient called for two lift assists off of the toilet today. Patient states she was unable to get off the toilet due to her right leg hurting. Patient states her right leg started hurting a few days ago when she was sitting a lot of 2-3 days. Patient is alert and oriented at this time. Patient skin p,w,d. Patient respirations are even and unlabored at this time.

## 2023-11-26 NOTE — H&P
Hospital Medicine  History and Physical    Patient:  Natalia Rasmussen  MRN: [id number]    CHIEF COMPLAINT:    Chief Complaint   Patient presents with    Leg Pain     Patient states right leg pain that started after she was sitting for 2-3 days. Fatigue     Patient states weakness and she was not able to get off the toilet due to right leg pain and feeling weak       History Obtained From:  Patient, EMR  Primary Care Physician: Bobby Still MD    HISTORY OF PRESENT ILLNESS:   The patient is a 78 y.o. female with PMH of hypertension and hypothyroidism. Patient called the squad twice over the last 48 hours due to worsening pain in the right proximal femur causing the patient's discomfort every time she tries to stand up and walk. Patient denies any fall or trauma. No fever or chills. Patient was found to have UTI. Basic workup done in the emergency room for the right leg Pain was unrevealing and therefore I accepted her to be placed under observation for additional diagnostic and therapeutic invention.     Past Medical History:      Diagnosis Date    Abdominal aortic aneurysm without rupture (HCC)     Age-related macular degeneration     Dehydration     Esophageal reflux     Falls frequently 01/02/2022    Hearing loss     High blood pressure     HIGH CHOLESTEROL     Hyperlipidemia     Hypothyroidism     Imbalance     Incontinence     Nausea     Obesity     Orthostatic dizziness     Osteoarthrosis     Overactive bladder     Thyroid disease        Past Surgical History:      Procedure Laterality Date    CATARACT REMOVAL      CHOLECYSTECTOMY      CHOLECYSTECTOMY      EYE SURGERY Bilateral     cataract    HYSTERECTOMY (CERVIX STATUS UNKNOWN)      IR NEUROSTIMULATOR PLACEMENT  07/28/2021    NERVE SURGERY N/A 7/28/2021    STAGE 1 & STAGE 2 INTERSTIM performed by Laura Roper MD at [address] [place] [address]  01/2022       Medications Prior to Admission:    Prior to Admission medications    Medication Sig

## 2023-11-26 NOTE — ED NOTES
Report was given to DARBY Amaral on [address] [facility] [address], [place]  11/26/23 0081

## 2023-11-26 NOTE — CONSULTS
Subjective/Objective/Assessment/Plan:     SUBJECTIVE -patient presents with a 2-day history of right thigh pain. She cannot think of anything that would have caused this pain. There is no injury. She did do a fair amount of driving and sitting 2 days ago. No injury. Points to pain at the mid aspect of the femur on the anterior portion. OBJECTIVE -no pain to palpation over the femur. Pain with attempted range of motion of the femur. Denies pain at the groin. XR FEMUR RIGHT (MIN 2 VIEWS)  Narrative: EXAMINATION:  ONE XRAY VIEW OF THE CHEST; 2 XRAY VIEWS OF THE RIGHT FEMUR    11/26/2023 6:29 am    COMPARISON:  None. HISTORY:  ORDERING SYSTEM PROVIDED HISTORY: PNA  TECHNOLOGIST PROVIDED HISTORY:  Reason for exam:->PNA  What reading provider will be dictating this exam?->CRC; ORDERING SYSTEM  PROVIDED HISTORY: thigh pain  TECHNOLOGIST PROVIDED HISTORY:  Reason for exam:->thigh pain  What reading provider will be dictating this exam?->CRC    FINDINGS:  Two views of the right femur reveal degenerative changes involving the right  hip. There is a stimulator device seen overlying the right femoral  neck/head. Cortex is intact. Degenerative changes involving the right knee. No joint effusion. Portable chest reveals cardiac and mediastinal silhouettes within normal  limits. The lung fields are grossly clear. Pacer leads in satisfactory  position. Mild chronic changes seen throughout the lung fields bilaterally. Vascular calcifications seen within the thoracic aorta. No pleural effusion  or pneumothorax. Degenerative changes seen within the spine. Impression: No acute bony abnormality seen within the right femur. There is a stimulator  generator overlying the right femoral head/neck. No acute cardiopulmonary disease. XR CHEST PORTABLE  Narrative: EXAMINATION:  ONE XRAY VIEW OF THE CHEST; 2 XRAY VIEWS OF THE RIGHT FEMUR    11/26/2023 6:29 am    COMPARISON:  None.     HISTORY:  ORDERING SYSTEM

## 2023-11-26 NOTE — ED NOTES
Dr. [place] called back at [address]     Page, [address]  11/26/23 0743

## 2023-11-27 LAB
ANION GAP SERPL CALCULATED.3IONS-SCNC: 10 MEQ/L (ref 9–15)
BACTERIA UR CULT: ABNORMAL
BACTERIA UR CULT: ABNORMAL
BASOPHILS # BLD: 0.1 K/UL (ref 0–0.2)
BASOPHILS NFR BLD: 0.7 %
BUN SERPL-MCNC: 12 MG/DL (ref 8–23)
CALCIUM SERPL-MCNC: 8.6 MG/DL (ref 8.5–9.9)
CHLORIDE SERPL-SCNC: 109 MEQ/L (ref 95–107)
CO2 SERPL-SCNC: 22 MEQ/L (ref 20–31)
CREAT SERPL-MCNC: 0.51 MG/DL (ref 0.5–0.9)
EOSINOPHIL # BLD: 0.2 K/UL (ref 0–0.7)
EOSINOPHIL NFR BLD: 1.7 %
ERYTHROCYTE [DISTWIDTH] IN BLOOD BY AUTOMATED COUNT: 13.7 % (ref 11.5–14.5)
GLUCOSE SERPL-MCNC: 103 MG/DL (ref 70–99)
HCT VFR BLD AUTO: 35.6 % (ref 37–47)
HGB BLD-MCNC: 10.9 G/DL (ref 12–16)
LYMPHOCYTES # BLD: 2.1 K/UL (ref 1–4.8)
LYMPHOCYTES NFR BLD: 21.7 %
MCH RBC QN AUTO: 26.3 PG (ref 27–31.3)
MCHC RBC AUTO-ENTMCNC: 30.6 % (ref 33–37)
MCV RBC AUTO: 86 FL (ref 79.4–94.8)
MONOCYTES # BLD: 1.3 K/UL (ref 0.2–0.8)
MONOCYTES NFR BLD: 13.5 %
NEUTROPHILS # BLD: 5.9 K/UL (ref 1.4–6.5)
NEUTS SEG NFR BLD: 62.1 %
ORGANISM: ABNORMAL
PLATELET # BLD AUTO: 307 K/UL (ref 130–400)
POTASSIUM SERPL-SCNC: 4 MEQ/L (ref 3.4–4.9)
RBC # BLD AUTO: 4.14 M/UL (ref 4.2–5.4)
SODIUM SERPL-SCNC: 141 MEQ/L (ref 135–144)
WBC # BLD AUTO: 9.5 K/UL (ref 4.8–10.8)

## 2023-11-27 PROCEDURE — 2580000003 HC RX 258: Performed by: INTERNAL MEDICINE

## 2023-11-27 PROCEDURE — 85025 COMPLETE CBC W/AUTO DIFF WBC: CPT

## 2023-11-27 PROCEDURE — 97116 GAIT TRAINING THERAPY: CPT

## 2023-11-27 PROCEDURE — 6360000002 HC RX W HCPCS: Performed by: INTERNAL MEDICINE

## 2023-11-27 PROCEDURE — 97161 PT EVAL LOW COMPLEX 20 MIN: CPT

## 2023-11-27 PROCEDURE — 36415 COLL VENOUS BLD VENIPUNCTURE: CPT

## 2023-11-27 PROCEDURE — G0378 HOSPITAL OBSERVATION PER HR: HCPCS

## 2023-11-27 PROCEDURE — 96376 TX/PRO/DX INJ SAME DRUG ADON: CPT

## 2023-11-27 PROCEDURE — 6370000000 HC RX 637 (ALT 250 FOR IP): Performed by: INTERNAL MEDICINE

## 2023-11-27 PROCEDURE — 96372 THER/PROPH/DIAG INJ SC/IM: CPT

## 2023-11-27 PROCEDURE — 96375 TX/PRO/DX INJ NEW DRUG ADDON: CPT

## 2023-11-27 PROCEDURE — 96366 THER/PROPH/DIAG IV INF ADDON: CPT

## 2023-11-27 PROCEDURE — 80048 BASIC METABOLIC PNL TOTAL CA: CPT

## 2023-11-27 RX ORDER — KETOROLAC TROMETHAMINE 15 MG/ML
15 INJECTION, SOLUTION INTRAMUSCULAR; INTRAVENOUS EVERY 8 HOURS
Status: DISCONTINUED | OUTPATIENT
Start: 2023-11-27 | End: 2023-11-28 | Stop reason: HOSPADM

## 2023-11-27 RX ORDER — LIDOCAINE 4 G/G
1 PATCH TOPICAL DAILY
Status: DISCONTINUED | OUTPATIENT
Start: 2023-11-27 | End: 2023-11-28 | Stop reason: HOSPADM

## 2023-11-27 RX ORDER — ACETAMINOPHEN 325 MG/1
650 TABLET ORAL EVERY 8 HOURS
Status: DISCONTINUED | OUTPATIENT
Start: 2023-11-27 | End: 2023-11-28 | Stop reason: HOSPADM

## 2023-11-27 RX ADMIN — KETOROLAC TROMETHAMINE 15 MG: 15 INJECTION, SOLUTION INTRAMUSCULAR; INTRAVENOUS at 20:14

## 2023-11-27 RX ADMIN — LOSARTAN POTASSIUM 50 MG: 50 TABLET, FILM COATED ORAL at 20:11

## 2023-11-27 RX ADMIN — Medication 10 ML: at 20:16

## 2023-11-27 RX ADMIN — CEFTRIAXONE SODIUM 1000 MG: 1 INJECTION, POWDER, FOR SOLUTION INTRAMUSCULAR; INTRAVENOUS at 06:39

## 2023-11-27 RX ADMIN — CARBOXYMETHYLCELLULOSE SODIUM 1 DROP: 0.5 SOLUTION/ DROPS OPHTHALMIC at 14:43

## 2023-11-27 RX ADMIN — Medication 10 ML: at 13:17

## 2023-11-27 RX ADMIN — CARBOXYMETHYLCELLULOSE SODIUM 1 DROP: 0.5 SOLUTION/ DROPS OPHTHALMIC at 09:00

## 2023-11-27 RX ADMIN — ENOXAPARIN SODIUM 40 MG: 100 INJECTION SUBCUTANEOUS at 10:36

## 2023-11-27 RX ADMIN — METOPROLOL SUCCINATE 25 MG: 25 TABLET, EXTENDED RELEASE ORAL at 10:38

## 2023-11-27 RX ADMIN — LOSARTAN POTASSIUM 50 MG: 50 TABLET, FILM COATED ORAL at 10:38

## 2023-11-27 RX ADMIN — LEVOTHYROXINE SODIUM 112 MCG: 0.11 TABLET ORAL at 06:36

## 2023-11-27 RX ADMIN — ACETAMINOPHEN 650 MG: 325 TABLET ORAL at 13:09

## 2023-11-27 RX ADMIN — CARBOXYMETHYLCELLULOSE SODIUM 1 DROP: 0.5 SOLUTION/ DROPS OPHTHALMIC at 21:00

## 2023-11-27 RX ADMIN — PANTOPRAZOLE SODIUM 40 MG: 40 TABLET, DELAYED RELEASE ORAL at 06:36

## 2023-11-27 RX ADMIN — KETOROLAC TROMETHAMINE 15 MG: 15 INJECTION, SOLUTION INTRAMUSCULAR; INTRAVENOUS at 13:10

## 2023-11-27 RX ADMIN — ASPIRIN 81 MG: 81 TABLET, CHEWABLE ORAL at 10:38

## 2023-11-27 RX ADMIN — CITALOPRAM HYDROBROMIDE 10 MG: 10 TABLET ORAL at 10:36

## 2023-11-27 RX ADMIN — ATORVASTATIN CALCIUM 20 MG: 20 TABLET, FILM COATED ORAL at 20:14

## 2023-11-27 RX ADMIN — AMLODIPINE BESYLATE 5 MG: 5 TABLET ORAL at 10:38

## 2023-11-27 RX ADMIN — ACETAMINOPHEN 650 MG: 325 TABLET ORAL at 20:14

## 2023-11-27 ASSESSMENT — PAIN SCALES - GENERAL
PAINLEVEL_OUTOF10: 2
PAINLEVEL_OUTOF10: 1

## 2023-11-27 ASSESSMENT — PAIN DESCRIPTION - ORIENTATION: ORIENTATION: RIGHT

## 2023-11-27 ASSESSMENT — PAIN DESCRIPTION - LOCATION
LOCATION: OTHER (COMMENT)
LOCATION: ABDOMEN

## 2023-11-27 ASSESSMENT — PAIN DESCRIPTION - DESCRIPTORS
DESCRIPTORS: ACHING
DESCRIPTORS: ACHING

## 2023-11-27 NOTE — PLAN OF CARE
Problem: Discharge Planning  Goal: Discharge to home or other facility with appropriate resources  Outcome: Progressing  Flowsheets (Taken 11/26/2023 2102)  Discharge to home or other facility with appropriate resources:   Identify barriers to discharge with patient and caregiver   Arrange for needed discharge resources and transportation as appropriate   Identify discharge learning needs (meds, wound care, etc)     Problem: Skin/Tissue Integrity  Goal: Absence of new skin breakdown  Description: 1. Monitor for areas of redness and/or skin breakdown  2. Assess vascular access sites hourly  3. Every 4-6 hours minimum:  Change oxygen saturation probe site  4. Every 4-6 hours:  If on nasal continuous positive airway pressure, respiratory therapy assess nares and determine need for appliance change or resting period.   Outcome: Progressing     Problem: Safety - Adult  Goal: Free from fall injury  Outcome: Progressing     Problem: Pain  Goal: Verbalizes/displays adequate comfort level or baseline comfort level  Outcome: Progressing

## 2023-11-27 NOTE — FLOWSHEET NOTE
Assessment completed. VS obtained. Denies chest pain. sob or nausea. Alert et oriented x4. Up to chair with two assist. Call light within reach. Electronically signed by Sugey Lopez RN on 11/27/2023 at 12:53 PM

## 2023-11-27 NOTE — CARE COORDINATION
Met with patient to discuss dc planning. Patient notified of observation status. Patient states plan is to dc to home , states dtr will stay with her a couple days when dc'd. Patient given snf/rehab list in case she is unable to care for herself at home. She has walker,cane at home. Agrees to [facility] home care with pt/ot if dc'd to home. Will follow.

## 2023-11-28 VITALS
WEIGHT: 204 LBS | OXYGEN SATURATION: 98 % | DIASTOLIC BLOOD PRESSURE: 60 MMHG | HEIGHT: 64 IN | BODY MASS INDEX: 34.83 KG/M2 | RESPIRATION RATE: 18 BRPM | TEMPERATURE: 97.7 F | HEART RATE: 60 BPM | SYSTOLIC BLOOD PRESSURE: 130 MMHG

## 2023-11-28 PROCEDURE — 6370000000 HC RX 637 (ALT 250 FOR IP): Performed by: INTERNAL MEDICINE

## 2023-11-28 PROCEDURE — 6360000002 HC RX W HCPCS: Performed by: INTERNAL MEDICINE

## 2023-11-28 PROCEDURE — 97535 SELF CARE MNGMENT TRAINING: CPT

## 2023-11-28 PROCEDURE — 97166 OT EVAL MOD COMPLEX 45 MIN: CPT

## 2023-11-28 PROCEDURE — 96372 THER/PROPH/DIAG INJ SC/IM: CPT

## 2023-11-28 PROCEDURE — 97116 GAIT TRAINING THERAPY: CPT

## 2023-11-28 PROCEDURE — 96376 TX/PRO/DX INJ SAME DRUG ADON: CPT

## 2023-11-28 PROCEDURE — G0378 HOSPITAL OBSERVATION PER HR: HCPCS

## 2023-11-28 PROCEDURE — 96366 THER/PROPH/DIAG IV INF ADDON: CPT

## 2023-11-28 PROCEDURE — 2580000003 HC RX 258: Performed by: INTERNAL MEDICINE

## 2023-11-28 RX ORDER — NITROFURANTOIN 25; 75 MG/1; MG/1
100 CAPSULE ORAL 2 TIMES DAILY
Qty: 6 CAPSULE | Refills: 0 | Status: SHIPPED | OUTPATIENT
Start: 2023-11-29 | End: 2023-12-02

## 2023-11-28 RX ADMIN — PANTOPRAZOLE SODIUM 40 MG: 40 TABLET, DELAYED RELEASE ORAL at 06:11

## 2023-11-28 RX ADMIN — KETOROLAC TROMETHAMINE 15 MG: 15 INJECTION, SOLUTION INTRAMUSCULAR; INTRAVENOUS at 03:02

## 2023-11-28 RX ADMIN — CEFTRIAXONE SODIUM 1000 MG: 1 INJECTION, POWDER, FOR SOLUTION INTRAMUSCULAR; INTRAVENOUS at 06:13

## 2023-11-28 RX ADMIN — Medication 10 ML: at 10:23

## 2023-11-28 RX ADMIN — AMLODIPINE BESYLATE 5 MG: 5 TABLET ORAL at 10:23

## 2023-11-28 RX ADMIN — ACETAMINOPHEN 650 MG: 325 TABLET ORAL at 06:10

## 2023-11-28 RX ADMIN — CITALOPRAM HYDROBROMIDE 10 MG: 10 TABLET ORAL at 10:23

## 2023-11-28 RX ADMIN — ASPIRIN 81 MG: 81 TABLET, CHEWABLE ORAL at 10:21

## 2023-11-28 RX ADMIN — ENOXAPARIN SODIUM 40 MG: 100 INJECTION SUBCUTANEOUS at 10:20

## 2023-11-28 RX ADMIN — METOPROLOL SUCCINATE 25 MG: 25 TABLET, EXTENDED RELEASE ORAL at 10:21

## 2023-11-28 RX ADMIN — KETOROLAC TROMETHAMINE 15 MG: 15 INJECTION, SOLUTION INTRAMUSCULAR; INTRAVENOUS at 11:34

## 2023-11-28 RX ADMIN — LEVOTHYROXINE SODIUM 112 MCG: 0.11 TABLET ORAL at 06:11

## 2023-11-28 ASSESSMENT — PAIN DESCRIPTION - LOCATION
LOCATION: ABDOMEN;BACK
LOCATION: BACK;ABDOMEN

## 2023-11-28 ASSESSMENT — PAIN DESCRIPTION - ORIENTATION: ORIENTATION: RIGHT

## 2023-11-28 ASSESSMENT — PAIN SCALES - GENERAL
PAINLEVEL_OUTOF10: 1
PAINLEVEL_OUTOF10: 2
PAINLEVEL_OUTOF10: 2

## 2023-11-28 ASSESSMENT — PAIN DESCRIPTION - DESCRIPTORS
DESCRIPTORS: ACHING
DESCRIPTORS: ACHING

## 2023-11-28 NOTE — DISCHARGE INSTR - COC
BFVP:[id number]}  Med Delivery   [address] [place] [address] MED Delivery:350644125}    Wound Care Documentation and Therapy:  Wound 22 Chest Left;Upper Pacemaker Placement (Active)   Number of days: 682       Incision 21 Buttocks Right (Active)   Number of days: 853        Elimination:  Continence: Bowel: {YES / DW:47547}  Bladder: {YES / TV:86708}  Urinary Catheter: {Urinary Catheter:206638544}   Colostomy/Ileostomy/Ileal Conduit: {YES / YJ:94227}       Date of Last BM: ***    Intake/Output Summary (Last 24 hours) at 2023 1318  Last data filed at 2023 0626  Gross per 24 hour   Intake 79.42 ml   Output 700 ml   Net -620.58 ml     I/O last 3 completed shifts: In: 1073.4 [P.O.:240;  I.V.:764; IV Piggyback:69.4]  Out: 2200 [Urine:2200]    Safety Concerns:     [address] [place] [address] Safety Concerns:005402368}    Impairments/Disabilities:      [address] [place] [address] Impairments/Disabilities:899239464}    Nutrition Therapy:  Current Nutrition Therapy:   [address] [place] [address] Diet List:100399713}    Routes of Feeding: {[facility] DME Other Feedings:779164026}  Liquids: {Slp liquid thickness:12358}  Daily Fluid Restriction: {[facility] DME Yes amt example:062414466}  Last Modified Barium Swallow with Video (Video Swallowing Test): {Done Not Done YTDB:356325683}    Treatments at the Time of Hospital Discharge:   Respiratory Treatments: ***  Oxygen Therapy:  {Therapy; copd oxygen:03125}  Ventilator:    { CC Vent DGHZ:552708752}    Rehab Therapies: {THERAPEUTIC INTERVENTION:1935023030}  Weight Bearing Status/Restrictions: [address] [place] [address] Weight Bearin}  Other Medical Equipment (for information only, NOT a DME order):  {EQUIPMENT:565965939}  Other Treatments: ***    Patient's personal belongings (please select all that are sent with patient):  {[facility] DME Belongings:012306535}    RN SIGNATURE:  {Esignature:468466945}    CASE MANAGEMENT/SOCIAL WORK SECTION    Inpatient Status Date: ***    Readmission Risk Assessment Score:  Readmission Risk              Risk of Unplanned

## 2023-11-28 NOTE — FLOWSHEET NOTE
Pt is awake and up to the chair. She worked with PT this morning. AM care done with assist PCA. Pt accepted AM meds without problem. Call light in reach. 1415 discharge instructions reviewed with pt. Understanding verbalized. S/l removed. Pt is getting dressed and waiting for daughter for ride home.

## 2023-11-28 NOTE — DISCHARGE INSTR - DIET

## 2023-11-28 NOTE — CARE COORDINATION
Met with patient to discuss dc planning. Patient states she feels much better, was just ambulating in hallway. Patient anticipates dc home today with [facility] for PT/OT. Call placed to [facility] and spoke with Sumanth Beltran, if only PT/OT first visit would be dec. 3. Will need order.

## 2023-11-28 NOTE — DISCHARGE INSTRUCTIONS
I sent an antibiotic called Macrobid (aka Nitrofurantoin) to your preferred pharmacy. Since you received an antibiotic this morning, you can start taking this antibiotic pill tomorrow, 11/29.

## 2023-11-29 LAB
EKG ATRIAL RATE: 62 BPM
EKG P-R INTERVAL: 120 MS
EKG Q-T INTERVAL: 486 MS
EKG QRS DURATION: 174 MS
EKG QTC CALCULATION (BAZETT): 493 MS
EKG R AXIS: -60 DEGREES
EKG T AXIS: 110 DEGREES
EKG VENTRICULAR RATE: 62 BPM

## 2023-11-29 PROCEDURE — 93010 ELECTROCARDIOGRAM REPORT: CPT | Performed by: INTERNAL MEDICINE

## 2023-12-26 PROBLEM — N39.0 UTI (URINARY TRACT INFECTION): Status: RESOLVED | Noted: 2023-11-26 | Resolved: 2023-12-26

## 2023-12-28 ENCOUNTER — HOSPITAL ENCOUNTER (OUTPATIENT)
Dept: CARDIOLOGY | Age: 83
Discharge: HOME | End: 2023-12-28
Payer: MEDICARE

## 2023-12-28 PROCEDURE — 93296 REM INTERROG EVL PM/IDS: CPT

## 2023-12-28 PROCEDURE — 93294 REM INTERROG EVL PM/LDLS PM: CPT | Performed by: INTERNAL MEDICINE

## 2024-02-14 ENCOUNTER — OFFICE VISIT (OUTPATIENT)
Dept: PODIATRY | Age: 84
End: 2024-02-14
Payer: MEDICARE

## 2024-02-14 VITALS — WEIGHT: 192 LBS | BODY MASS INDEX: 32.78 KG/M2 | HEIGHT: 64 IN | TEMPERATURE: 97.1 F

## 2024-02-14 DIAGNOSIS — B35.1 DERMATOPHYTOSIS OF NAIL: Primary | ICD-10-CM

## 2024-02-14 DIAGNOSIS — M79.675 PAIN IN TOES OF BOTH FEET: ICD-10-CM

## 2024-02-14 DIAGNOSIS — R26.89 ANTALGIC GAIT: ICD-10-CM

## 2024-02-14 DIAGNOSIS — M79.674 PAIN IN TOES OF BOTH FEET: ICD-10-CM

## 2024-02-14 PROCEDURE — 99999 PR OFFICE/OUTPT VISIT,PROCEDURE ONLY: CPT | Performed by: PODIATRIST

## 2024-02-14 PROCEDURE — 11721 DEBRIDE NAIL 6 OR MORE: CPT | Performed by: PODIATRIST

## 2024-02-14 NOTE — PROGRESS NOTES
Pitting Edema present.      Right foot: Decreased range of motion. Deformity and bunion present.      Left foot: Decreased range of motion. Deformity and bunion present.   Feet:      Right foot:      Protective Sensation: 10 sites tested.  10 sites sensed.      Skin integrity: Callus present.      Toenail Condition: Right toenails are abnormally thick, long and ingrown. Fungal disease present.     Left foot:      Protective Sensation: 10 sites tested.  10 sites sensed.      Skin integrity: Skin integrity normal.      Toenail Condition: Left toenails are abnormally thick, long and ingrown. Fungal disease present.     Comments: Planus foot type noted bilaterally, the right foot is abducted at the ankle.  MMT graded 5/5 for all extrinsic foot muscle groups bilaterally.  Decreased range of motion and stiffness noted with most joints of the foot and ankle bilaterally.  Hallux abductovalgus deformity noted bilaterally.  Flexion deformity noted to PIPJ 2-5 bilaterally.  Lymphadenopathy:      Comments: Popliteal lymph nodes are soft and nontender.   Skin:     Capillary Refill: Capillary refill takes 2 to 3 seconds.      Comments: No open lesions noted bilaterally.  Normal skin turgor noted bilaterally.  Normal skin texture noted bilaterally.  Focal hyperkeratotic lesion noted to medial aspect of the right hallux.  The toenails are elongated, incurvated, thickened, yellow in color, and there is subungual debris.   Neurological:      General: No focal deficit present.      Mental Status: She is alert and oriented to person, place, and time.      Deep Tendon Reflexes: Babinski sign absent on the right side. Babinski sign absent on the left side.      Reflex Scores:       Patellar reflexes are 2+ on the right side and 2+ on the left side.       Achilles reflexes are 2+ on the right side and 2+ on the left side.     Comments: No ankle clonus noted bilaterally.   Psychiatric:         Mood and Affect: Mood normal.

## 2024-03-27 DIAGNOSIS — Z95.0 CARDIAC PACEMAKER IN SITU: ICD-10-CM

## 2024-03-27 NOTE — PROGRESS NOTES
Rec'd fax from [facility] device clinic requesting  order for services rendered.  Order entered for date of service rendered and x next year for routine device checks in-clinic and remotely or as directed by physician.

## 2024-04-01 ENCOUNTER — HOSPITAL ENCOUNTER (OUTPATIENT)
Dept: CARDIOLOGY | Age: 84
Discharge: HOME | End: 2024-04-01
Payer: MEDICARE

## 2024-04-01 PROCEDURE — 93294 REM INTERROG EVL PM/LDLS PM: CPT | Performed by: INTERNAL MEDICINE

## 2024-04-01 PROCEDURE — 93296 REM INTERROG EVL PM/IDS: CPT

## 2024-04-22 ENCOUNTER — APPOINTMENT (OUTPATIENT)
Dept: CARDIOLOGY | Facility: CLINIC | Age: 84
End: 2024-04-22
Payer: MEDICARE

## 2024-05-02 DIAGNOSIS — M25.562 ACUTE PAIN OF LEFT KNEE: Primary | ICD-10-CM

## 2024-05-06 ENCOUNTER — OFFICE VISIT (OUTPATIENT)
Dept: FAMILY MEDICINE CLINIC | Age: 84
End: 2024-05-06

## 2024-05-06 VITALS
BODY MASS INDEX: 31.99 KG/M2 | SYSTOLIC BLOOD PRESSURE: 118 MMHG | DIASTOLIC BLOOD PRESSURE: 60 MMHG | HEART RATE: 62 BPM | HEIGHT: 65 IN | WEIGHT: 192 LBS | OXYGEN SATURATION: 97 %

## 2024-05-06 DIAGNOSIS — Z91.81 RISK FOR FALLS: ICD-10-CM

## 2024-05-06 DIAGNOSIS — S83.8X2A INJURY OF MENISCUS OF LEFT KNEE, INITIAL ENCOUNTER: ICD-10-CM

## 2024-05-06 DIAGNOSIS — M17.12 PRIMARY OSTEOARTHRITIS OF LEFT KNEE: ICD-10-CM

## 2024-05-06 DIAGNOSIS — M11.20 CHONDROCALCINOSIS: Primary | ICD-10-CM

## 2024-05-06 RX ORDER — LIDOCAINE HYDROCHLORIDE 10 MG/ML
2 INJECTION, SOLUTION INFILTRATION; PERINEURAL ONCE
Status: COMPLETED | OUTPATIENT
Start: 2024-05-06 | End: 2024-05-06

## 2024-05-06 RX ORDER — TRIAMCINOLONE ACETONIDE 40 MG/ML
80 INJECTION, SUSPENSION INTRA-ARTICULAR; INTRAMUSCULAR ONCE
Status: COMPLETED | OUTPATIENT
Start: 2024-05-06 | End: 2024-05-06

## 2024-05-06 RX ORDER — CLOBETASOL PROPIONATE 0.46 MG/ML
SOLUTION TOPICAL
COMMUNITY
Start: 2024-02-03

## 2024-05-06 RX ADMIN — TRIAMCINOLONE ACETONIDE 80 MG: 40 INJECTION, SUSPENSION INTRA-ARTICULAR; INTRAMUSCULAR at 14:30

## 2024-05-06 RX ADMIN — LIDOCAINE HYDROCHLORIDE 2 ML: 10 INJECTION, SOLUTION INFILTRATION; PERINEURAL at 14:30

## 2024-05-06 SDOH — ECONOMIC STABILITY: FOOD INSECURITY: WITHIN THE PAST 12 MONTHS, YOU WORRIED THAT YOUR FOOD WOULD RUN OUT BEFORE YOU GOT MONEY TO BUY MORE.: NEVER TRUE

## 2024-05-06 SDOH — ECONOMIC STABILITY: HOUSING INSECURITY
IN THE LAST 12 MONTHS, WAS THERE A TIME WHEN YOU DID NOT HAVE A STEADY PLACE TO SLEEP OR SLEPT IN A SHELTER (INCLUDING NOW)?: NO

## 2024-05-06 SDOH — ECONOMIC STABILITY: FOOD INSECURITY: WITHIN THE PAST 12 MONTHS, THE FOOD YOU BOUGHT JUST DIDN'T LAST AND YOU DIDN'T HAVE MONEY TO GET MORE.: NEVER TRUE

## 2024-05-06 SDOH — ECONOMIC STABILITY: INCOME INSECURITY: HOW HARD IS IT FOR YOU TO PAY FOR THE VERY BASICS LIKE FOOD, HOUSING, MEDICAL CARE, AND HEATING?: NOT HARD AT ALL

## 2024-05-06 ASSESSMENT — PATIENT HEALTH QUESTIONNAIRE - PHQ9
SUM OF ALL RESPONSES TO PHQ9 QUESTIONS 1 & 2: 0
1. LITTLE INTEREST OR PLEASURE IN DOING THINGS: NOT AT ALL
2. FEELING DOWN, DEPRESSED OR HOPELESS: NOT AT ALL
SUM OF ALL RESPONSES TO PHQ QUESTIONS 1-9: 0

## 2024-05-06 ASSESSMENT — ENCOUNTER SYMPTOMS: BACK PAIN: 0

## 2024-05-06 NOTE — PROGRESS NOTES
MG tablet, Take 2 tablets by mouth every 6 hours as needed for Pain, Disp: 60 tablet, Rfl: 1    amLODIPine (NORVASC) 5 MG tablet, TAKE 1 TABLET BY MOUTH DAILY, Disp: , Rfl:     SYNTHROID 112 MCG tablet, Take 1 tablet by mouth Daily, Disp: , Rfl:     atorvastatin (LIPITOR) 20 MG tablet, Take 4 tablets by mouth daily, Disp: , Rfl:     aspirin 81 MG chewable tablet, Take 1 tablet by mouth daily, Disp: , Rfl:     nitroGLYCERIN (NITROSTAT) 0.4 MG SL tablet, Place 1 tablet under the tongue every 5 minutes as needed for Chest pain up to max of 3 total doses. If no relief after 1 dose, call 911., Disp: 25 tablet, Rfl: 3    Multiple Vitamins-Minerals (PRESERVISION AREDS) CAPS, Take by mouth daily, Disp: , Rfl:     losartan (COZAAR) 100 MG tablet, Take 25 mg by mouth daily, Disp: , Rfl:    Past Medical History:   Diagnosis Date    Abdominal aortic aneurysm without rupture (HCC)     Age-related macular degeneration     Dehydration     Esophageal reflux     Falls frequently 01/02/2022    Hearing loss     High blood pressure     HIGH CHOLESTEROL     Hyperlipidemia     Hypothyroidism     Imbalance     Incontinence     Nausea     Obesity     Orthostatic dizziness     Osteoarthrosis     Overactive bladder     Thyroid disease      Past Surgical History:   Procedure Laterality Date    CATARACT REMOVAL      CHOLECYSTECTOMY      CHOLECYSTECTOMY      EYE SURGERY Bilateral     cataract    HYSTERECTOMY (CERVIX STATUS UNKNOWN)      IR NEUROSTIMULATOR PLACEMENT  07/28/2021    NERVE SURGERY N/A 7/28/2021    STAGE 1 & STAGE 2 INTERSTIM [place] [facility] [place]    PACEMAKER PLACEMENT  01/2022     No family history on file.  Social History     Tobacco Use    Smoking status: Never    Smokeless tobacco: Never   Substance Use Topics    Alcohol use: Yes     Comment: occasionally       PMH, Surgical Hx, Family Hx, and Social Hx reviewed and updated.  Health Maintenance reviewed.    Reviewed with the patient: current clinical status,

## 2024-06-17 ENCOUNTER — OFFICE VISIT (OUTPATIENT)
Dept: FAMILY MEDICINE CLINIC | Age: 84
End: 2024-06-17
Payer: MEDICARE

## 2024-06-17 VITALS
BODY MASS INDEX: 34.31 KG/M2 | HEART RATE: 76 BPM | DIASTOLIC BLOOD PRESSURE: 64 MMHG | HEIGHT: 64 IN | WEIGHT: 201 LBS | OXYGEN SATURATION: 98 % | SYSTOLIC BLOOD PRESSURE: 128 MMHG

## 2024-06-17 DIAGNOSIS — M75.51 SUBACROMIAL BURSITIS OF RIGHT SHOULDER JOINT: ICD-10-CM

## 2024-06-17 DIAGNOSIS — M17.12 PRIMARY OSTEOARTHRITIS OF LEFT KNEE: Primary | ICD-10-CM

## 2024-06-17 DIAGNOSIS — M75.81 RIGHT ROTATOR CUFF TENDINITIS: ICD-10-CM

## 2024-06-17 DIAGNOSIS — M11.20 CHONDROCALCINOSIS: ICD-10-CM

## 2024-06-17 PROCEDURE — 1123F ACP DISCUSS/DSCN MKR DOCD: CPT | Performed by: FAMILY MEDICINE

## 2024-06-17 PROCEDURE — 99214 OFFICE O/P EST MOD 30 MIN: CPT | Performed by: FAMILY MEDICINE

## 2024-06-17 PROCEDURE — 20610 DRAIN/INJ JOINT/BURSA W/O US: CPT | Performed by: FAMILY MEDICINE

## 2024-06-17 PROCEDURE — 3074F SYST BP LT 130 MM HG: CPT | Performed by: FAMILY MEDICINE

## 2024-06-17 PROCEDURE — 3078F DIAST BP <80 MM HG: CPT | Performed by: FAMILY MEDICINE

## 2024-06-17 RX ORDER — BETAMETHASONE SODIUM PHOSPHATE AND BETAMETHASONE ACETATE 3; 3 MG/ML; MG/ML
6 INJECTION, SUSPENSION INTRA-ARTICULAR; INTRALESIONAL; INTRAMUSCULAR; SOFT TISSUE ONCE
Status: COMPLETED | OUTPATIENT
Start: 2024-06-17 | End: 2024-06-17

## 2024-06-17 RX ORDER — LIDOCAINE HYDROCHLORIDE 10 MG/ML
4 INJECTION, SOLUTION INFILTRATION; PERINEURAL ONCE
Status: COMPLETED | OUTPATIENT
Start: 2024-06-17 | End: 2024-06-17

## 2024-06-17 RX ADMIN — BETAMETHASONE SODIUM PHOSPHATE AND BETAMETHASONE ACETATE 6 MG: 3; 3 INJECTION, SUSPENSION INTRA-ARTICULAR; INTRALESIONAL; INTRAMUSCULAR; SOFT TISSUE at 13:45

## 2024-06-17 RX ADMIN — LIDOCAINE HYDROCHLORIDE 4 ML: 10 INJECTION, SOLUTION INFILTRATION; PERINEURAL at 13:45

## 2024-06-17 ASSESSMENT — ENCOUNTER SYMPTOMS: BACK PAIN: 0

## 2024-06-17 NOTE — PROGRESS NOTES
Hyperlipidemia     Hypothyroidism     Imbalance     Incontinence     Nausea     Obesity     Orthostatic dizziness     Osteoarthrosis     Overactive bladder     Thyroid disease      Past Surgical History:   Procedure Laterality Date    CATARACT REMOVAL      CHOLECYSTECTOMY      CHOLECYSTECTOMY      EYE SURGERY Bilateral     cataract    HYSTERECTOMY (CERVIX STATUS UNKNOWN)      IR NEUROSTIMULATOR PLACEMENT  07/28/2021    NERVE SURGERY N/A 7/28/2021    STAGE 1 & STAGE 2 INTERSTIM performed by Isi Dutta MD at [facility] [place]    PACEMAKER PLACEMENT  01/2022     No family history on file.  Social History     Tobacco Use    Smoking status: Never    Smokeless tobacco: Never   Substance Use Topics    Alcohol use: Yes     Comment: occasionally       PMH, Surgical Hx, Family Hx, and Social Hx reviewed and updated.  Health Maintenance reviewed.    Reviewed with the patient: current clinical status, medications, labs, images, activities and diet.     Side effects, adverse effects of the medication prescribed today, as well as treatment plan/ rationale and result expectations have been discussed with the patient who expresses understanding and desires to proceed.  Close follow up to evaluate treatment results and for coordination of care.    I have reviewed the patient's medical history in detail and updated the computerized patient record.    Objective     Vitals:    06/17/24 1342   BP: 128/64   Site: Right Upper Arm   Position: Sitting   Cuff Size: Large Adult   Pulse: 76   SpO2: 98%   Weight: 91.2 kg (201 lb)   Height: 1.626 m (5' 4\")        Physical Exam  Musculoskeletal:      Right shoulder: Tenderness present. No deformity, effusion or laceration. Normal pulse.      Left knee: Crepitus present. No swelling, erythema or bony tenderness. No tenderness.      Comments: Pain associated with resistance to abduction and external rotation of the right shoulder.  ROM-Limited secondary to pain.    Neer's and Alcaraz test both

## 2024-06-28 ENCOUNTER — APPOINTMENT (OUTPATIENT)
Dept: CARDIOLOGY | Facility: CLINIC | Age: 84
End: 2024-06-28
Payer: MEDICARE

## 2024-06-28 DIAGNOSIS — Z91.199 NO-SHOW FOR APPOINTMENT: Primary | ICD-10-CM

## 2024-07-08 ENCOUNTER — HOSPITAL ENCOUNTER (OUTPATIENT)
Dept: CARDIOLOGY | Age: 84
Discharge: HOME | End: 2024-07-08
Payer: MEDICARE

## 2024-07-08 PROCEDURE — 93294 REM INTERROG EVL PM/LDLS PM: CPT | Performed by: INTERNAL MEDICINE

## 2024-07-08 PROCEDURE — 93296 REM INTERROG EVL PM/IDS: CPT

## 2024-07-11 ENCOUNTER — APPOINTMENT (OUTPATIENT)
Dept: CARDIOLOGY | Facility: CLINIC | Age: 84
End: 2024-07-11
Payer: MEDICARE

## 2024-07-12 ENCOUNTER — OFFICE VISIT (OUTPATIENT)
Dept: PODIATRY | Age: 84
End: 2024-07-12

## 2024-07-12 VITALS — HEIGHT: 64 IN | BODY MASS INDEX: 35.34 KG/M2 | WEIGHT: 207 LBS | TEMPERATURE: 97.1 F

## 2024-07-12 DIAGNOSIS — R26.89 ANTALGIC GAIT: ICD-10-CM

## 2024-07-12 DIAGNOSIS — M79.675 PAIN IN TOES OF BOTH FEET: ICD-10-CM

## 2024-07-12 DIAGNOSIS — M79.674 PAIN IN TOES OF BOTH FEET: ICD-10-CM

## 2024-07-12 DIAGNOSIS — B35.1 DERMATOPHYTOSIS OF NAIL: Primary | ICD-10-CM

## 2024-07-12 ASSESSMENT — ENCOUNTER SYMPTOMS
BACK PAIN: 0
SHORTNESS OF BREATH: 0
VOMITING: 0
NAUSEA: 0

## 2024-07-12 NOTE — PROGRESS NOTES
[facility] [place] [facility], [facility]  [address]  [place] [address]  Dept: [phone]  Loc: [phone]       Hanane Gilliland  (1940)    7/12/24    Subjective     Hanane Gilliland is 83 y.o. female who complains today of:    Chief Complaint   Patient presents with    Nail Problem     Both feet       HPI: Patient presents with a complaint of painful toenails both feet.  Patient reports continued difficulty performing self-care due to the thickness and deformity of the nail plates, she reports that this is made more difficult due to her physical limitations.  Patient denies a history of diabetes.    Review of Systems   Constitutional:  Negative for chills and fever.   HENT:  Negative for hearing loss.    Respiratory:  Negative for shortness of breath.    Cardiovascular:  Negative for chest pain.   Gastrointestinal:  Negative for nausea and vomiting.   Genitourinary:  Negative for difficulty urinating.   Musculoskeletal:  Positive for gait problem. Negative for back pain.   Skin:  Negative for wound.   Neurological:  Negative for numbness.   Hematological:  Does not bruise/bleed easily.   Psychiatric/Behavioral:  Negative for sleep disturbance.        The patient is not a diabetic.   PCP/ Endocrinologist: Dionicio Rousseau MD   Date last seen: July 12, 2024    Allergies:  Buspar [buspirone] and Tramadol    Current Outpatient Medications on File Prior to Visit   Medication Sig Dispense Refill    vitamin D (CHOLECALCIFEROL) 50 MCG (2000 UT) CAPS capsule Take by mouth      clobetasol (TEMOVATE) 0.05 % external solution APPLY TO THE AFFECTED AREA(S) TWICE DAILY ON MONDAY THROUGH FRIDAY OF EACH WEEK only. TAKE breaks on weekends. repeat AS NEEDED      spironolactone (ALDACTONE) 25 MG tablet Take 1 tablet by mouth daily      citalopram (CELEXA) 10 MG tablet Take 1 tablet by mouth daily      omeprazole (PRILOSEC) 20 MG delayed release

## 2024-07-26 NOTE — PROGRESS NOTES
[address] [id number] [address]  Dept: [phone]  Loc: [phone]       Cecy Walter  (1940)    4/3/23    Subjective     Cecy Walter is 78 y.o. female who complains today of:    Chief Complaint   Patient presents with    Nail Problem     Both feet       HPI: Patient presents with a complaint of painful toenails both feet. This is a chronic problem, patient states that she is unable to trim the nails herself due to the thickness and deformity of the nail plates. Patient denies a history of diabetes. Pain reports increased discomfort with ambulation as the nails are elongated due to pressure to the toenails from her shoes. Review of Systems   Constitutional:  Negative for chills and fever. HENT:  Positive for hearing loss. Respiratory:  Negative for shortness of breath. Cardiovascular:  Negative for chest pain. Gastrointestinal:  Negative for nausea and vomiting. Genitourinary:  Negative for difficulty urinating. Musculoskeletal:  Positive for back pain. Negative for gait problem. Skin:  Negative for wound. Neurological:  Negative for numbness. Hematological:  Does not bruise/bleed easily. Psychiatric/Behavioral:  Negative for sleep disturbance. The patient is not a diabetic.    PCP/ Endocrinologist: Pham Grande MD   Date last seen: 3/24/2023    Allergies:  Tramadol    Current Outpatient Medications on File Prior to Visit   Medication Sig Dispense Refill    citalopram (CELEXA) 10 MG tablet Take 1 tablet by mouth daily      omeprazole (PRILOSEC) 20 MG delayed release capsule       docusate (COLACE, DULCOLAX) 100 MG CAPS Take by mouth      carboxymethylcellulose 1 % ophthalmic solution Apply to eye      busPIRone (BUSPAR) 5 MG tablet TAKE 1 TABLET BY MOUTH TWICE DAILY      Vitamin E 100 units TABS Take by mouth      Multiple Vitamin (MULTI-VITAMIN
[[id number]]

## 2024-08-26 ENCOUNTER — LAB (OUTPATIENT)
Dept: LAB | Facility: LAB | Age: 84
End: 2024-08-26
Payer: MEDICARE

## 2024-08-26 ENCOUNTER — APPOINTMENT (OUTPATIENT)
Dept: CARDIOLOGY | Facility: CLINIC | Age: 84
End: 2024-08-26
Payer: MEDICARE

## 2024-08-26 ENCOUNTER — TELEPHONE (OUTPATIENT)
Dept: CARDIOLOGY | Facility: CLINIC | Age: 84
End: 2024-08-26

## 2024-08-26 VITALS
BODY MASS INDEX: 36.06 KG/M2 | SYSTOLIC BLOOD PRESSURE: 118 MMHG | DIASTOLIC BLOOD PRESSURE: 66 MMHG | HEART RATE: 73 BPM | HEIGHT: 64 IN | WEIGHT: 211.2 LBS

## 2024-08-26 DIAGNOSIS — M15.8 OTHER OSTEOARTHRITIS INVOLVING MULTIPLE JOINTS: ICD-10-CM

## 2024-08-26 DIAGNOSIS — I44.2 IDIOVENTRICULAR RHYTHM (MULTI): ICD-10-CM

## 2024-08-26 DIAGNOSIS — I10 PRIMARY HYPERTENSION: ICD-10-CM

## 2024-08-26 DIAGNOSIS — I65.23 BILATERAL CAROTID ARTERY STENOSIS: ICD-10-CM

## 2024-08-26 DIAGNOSIS — I49.5 SINUS NODE DYSFUNCTION (MULTI): ICD-10-CM

## 2024-08-26 DIAGNOSIS — R94.31 ABNORMAL ELECTROCARDIOGRAM: ICD-10-CM

## 2024-08-26 DIAGNOSIS — L97.101 VARICOSE VEINS OF LOWER EXTREMITY WITH ULCER OF THIGH LIMITED TO BREAKDOWN OF SKIN, UNSPECIFIED LATERALITY (MULTI): ICD-10-CM

## 2024-08-26 DIAGNOSIS — E03.9 HYPOTHYROIDISM, UNSPECIFIED TYPE: ICD-10-CM

## 2024-08-26 DIAGNOSIS — I83.001 VARICOSE VEINS OF LOWER EXTREMITY WITH ULCER OF THIGH LIMITED TO BREAKDOWN OF SKIN, UNSPECIFIED LATERALITY (MULTI): ICD-10-CM

## 2024-08-26 DIAGNOSIS — I25.10 CORONARY ARTERY DISEASE INVOLVING NATIVE CORONARY ARTERY OF NATIVE HEART WITHOUT ANGINA PECTORIS: ICD-10-CM

## 2024-08-26 DIAGNOSIS — R94.39 ABNORMAL STRESS TEST: ICD-10-CM

## 2024-08-26 DIAGNOSIS — Z95.0 CARDIAC PACEMAKER IN SITU: ICD-10-CM

## 2024-08-26 DIAGNOSIS — E02 SUBCLINICAL IODINE-DEFICIENCY HYPOTHYROIDISM: ICD-10-CM

## 2024-08-26 DIAGNOSIS — R09.89 BILATERAL CAROTID BRUITS: ICD-10-CM

## 2024-08-26 DIAGNOSIS — G47.33 OBSTRUCTIVE SLEEP APNEA, ADULT: ICD-10-CM

## 2024-08-26 DIAGNOSIS — R94.30 ABNORMAL RESULTS OF CARDIOVASCULAR FUNCTION STUDIES: ICD-10-CM

## 2024-08-26 DIAGNOSIS — N39.0 URINARY TRACT INFECTION WITHOUT HEMATURIA, SITE UNSPECIFIED: ICD-10-CM

## 2024-08-26 DIAGNOSIS — E66.09 CLASS 1 OBESITY DUE TO EXCESS CALORIES WITHOUT SERIOUS COMORBIDITY WITH BODY MASS INDEX (BMI) OF 33.0 TO 33.9 IN ADULT: ICD-10-CM

## 2024-08-26 DIAGNOSIS — R06.09 DOE (DYSPNEA ON EXERTION): ICD-10-CM

## 2024-08-26 DIAGNOSIS — E78.2 MIXED HYPERLIPIDEMIA: ICD-10-CM

## 2024-08-26 DIAGNOSIS — E03.9 HYPOTHYROIDISM, UNSPECIFIED TYPE: Primary | ICD-10-CM

## 2024-08-26 DIAGNOSIS — I71.30 RUPTURED ABDOMINAL AORTIC ANEURYSM (AAA), UNSPECIFIED PART (MULTI): ICD-10-CM

## 2024-08-26 LAB
ALBUMIN SERPL BCP-MCNC: 3.6 G/DL (ref 3.4–5)
ALP SERPL-CCNC: 82 U/L (ref 33–136)
ALT SERPL W P-5'-P-CCNC: 11 U/L (ref 7–45)
ANION GAP SERPL CALC-SCNC: 10 MMOL/L (ref 10–20)
APPEARANCE UR: CLEAR
AST SERPL W P-5'-P-CCNC: 13 U/L (ref 9–39)
BACTERIA #/AREA URNS AUTO: ABNORMAL /HPF
BILIRUB DIRECT SERPL-MCNC: 0.1 MG/DL (ref 0–0.3)
BILIRUB SERPL-MCNC: 0.5 MG/DL (ref 0–1.2)
BILIRUB UR STRIP.AUTO-MCNC: NEGATIVE MG/DL
BUN SERPL-MCNC: 16 MG/DL (ref 6–23)
CALCIUM SERPL-MCNC: 9 MG/DL (ref 8.6–10.3)
CHLORIDE SERPL-SCNC: 106 MMOL/L (ref 98–107)
CHOLEST SERPL-MCNC: 125 MG/DL (ref 0–199)
CHOLESTEROL/HDL RATIO: 2.9
CO2 SERPL-SCNC: 27 MMOL/L (ref 21–32)
COLOR UR: YELLOW
CREAT SERPL-MCNC: 0.66 MG/DL (ref 0.5–1.05)
EGFRCR SERPLBLD CKD-EPI 2021: 86 ML/MIN/1.73M*2
ERYTHROCYTE [DISTWIDTH] IN BLOOD BY AUTOMATED COUNT: 13.9 % (ref 11.5–14.5)
GLUCOSE SERPL-MCNC: 104 MG/DL (ref 74–99)
GLUCOSE UR STRIP.AUTO-MCNC: NORMAL MG/DL
HCT VFR BLD AUTO: 38.3 % (ref 36–46)
HDLC SERPL-MCNC: 43.3 MG/DL
HGB BLD-MCNC: 11.8 G/DL (ref 12–16)
KETONES UR STRIP.AUTO-MCNC: NEGATIVE MG/DL
LDLC SERPL CALC-MCNC: 56 MG/DL
LEUKOCYTE ESTERASE UR QL STRIP.AUTO: NEGATIVE
MAGNESIUM SERPL-MCNC: 1.71 MG/DL (ref 1.6–2.4)
MCH RBC QN AUTO: 26.4 PG (ref 26–34)
MCHC RBC AUTO-ENTMCNC: 30.8 G/DL (ref 32–36)
MCV RBC AUTO: 86 FL (ref 80–100)
MUCOUS THREADS #/AREA URNS AUTO: ABNORMAL /LPF
NITRITE UR QL STRIP.AUTO: NEGATIVE
NON HDL CHOLESTEROL: 82 MG/DL (ref 0–149)
NRBC BLD-RTO: 0 /100 WBCS (ref 0–0)
PH UR STRIP.AUTO: 5.5 [PH]
PLATELET # BLD AUTO: 299 X10*3/UL (ref 150–450)
POTASSIUM SERPL-SCNC: 4.2 MMOL/L (ref 3.5–5.3)
PROT SERPL-MCNC: 6.1 G/DL (ref 6.4–8.2)
PROT UR STRIP.AUTO-MCNC: NEGATIVE MG/DL
RBC # BLD AUTO: 4.47 X10*6/UL (ref 4–5.2)
RBC # UR STRIP.AUTO: ABNORMAL /UL
RBC #/AREA URNS AUTO: ABNORMAL /HPF
SODIUM SERPL-SCNC: 139 MMOL/L (ref 136–145)
SP GR UR STRIP.AUTO: 1.02
SQUAMOUS #/AREA URNS AUTO: ABNORMAL /HPF
T4 FREE SERPL-MCNC: 1.23 NG/DL (ref 0.61–1.12)
TRIGL SERPL-MCNC: 127 MG/DL (ref 0–149)
TSH SERPL-ACNC: 0.3 MIU/L (ref 0.44–3.98)
UROBILINOGEN UR STRIP.AUTO-MCNC: NORMAL MG/DL
VLDL: 25 MG/DL (ref 0–40)
WBC # BLD AUTO: 7 X10*3/UL (ref 4.4–11.3)
WBC #/AREA URNS AUTO: ABNORMAL /HPF

## 2024-08-26 PROCEDURE — 3074F SYST BP LT 130 MM HG: CPT | Performed by: NURSE PRACTITIONER

## 2024-08-26 PROCEDURE — 80053 COMPREHEN METABOLIC PANEL: CPT

## 2024-08-26 PROCEDURE — 1160F RVW MEDS BY RX/DR IN RCRD: CPT | Performed by: NURSE PRACTITIONER

## 2024-08-26 PROCEDURE — 84439 ASSAY OF FREE THYROXINE: CPT

## 2024-08-26 PROCEDURE — 82248 BILIRUBIN DIRECT: CPT

## 2024-08-26 PROCEDURE — 83735 ASSAY OF MAGNESIUM: CPT

## 2024-08-26 PROCEDURE — 84443 ASSAY THYROID STIM HORMONE: CPT

## 2024-08-26 PROCEDURE — 36415 COLL VENOUS BLD VENIPUNCTURE: CPT

## 2024-08-26 PROCEDURE — 3078F DIAST BP <80 MM HG: CPT | Performed by: NURSE PRACTITIONER

## 2024-08-26 PROCEDURE — 81001 URINALYSIS AUTO W/SCOPE: CPT

## 2024-08-26 PROCEDURE — 85027 COMPLETE CBC AUTOMATED: CPT

## 2024-08-26 PROCEDURE — 1159F MED LIST DOCD IN RCRD: CPT | Performed by: NURSE PRACTITIONER

## 2024-08-26 PROCEDURE — 99214 OFFICE O/P EST MOD 30 MIN: CPT | Performed by: NURSE PRACTITIONER

## 2024-08-26 PROCEDURE — 80061 LIPID PANEL: CPT

## 2024-08-26 RX ORDER — METOPROLOL SUCCINATE 25 MG/1
25 TABLET, EXTENDED RELEASE ORAL DAILY
Qty: 90 TABLET | Refills: 2 | Status: SHIPPED | OUTPATIENT
Start: 2024-08-26

## 2024-08-26 RX ORDER — ATORVASTATIN CALCIUM 80 MG/1
80 TABLET, FILM COATED ORAL DAILY
Qty: 90 TABLET | Refills: 3 | Status: SHIPPED | OUTPATIENT
Start: 2024-08-26

## 2024-08-26 RX ORDER — LOSARTAN POTASSIUM 25 MG/1
25 TABLET ORAL DAILY
Qty: 90 TABLET | Refills: 2 | Status: SHIPPED | OUTPATIENT
Start: 2024-08-26 | End: 2025-08-26

## 2024-08-26 RX ORDER — AMLODIPINE BESYLATE 5 MG/1
5 TABLET ORAL DAILY
Qty: 90 TABLET | Refills: 3 | Status: CANCELLED | OUTPATIENT
Start: 2024-08-26

## 2024-08-26 RX ORDER — SPIRONOLACTONE 25 MG/1
12.5 TABLET ORAL DAILY
Qty: 90 TABLET | Refills: 2 | Status: SHIPPED | OUTPATIENT
Start: 2024-08-26

## 2024-08-26 NOTE — TELEPHONE ENCOUNTER
----- Message from Romy Esquivel sent at 8/26/2024  4:30 PM EDT -----  Please let her know that her labs showed her kidney, liver function and blood count are stable.  Her cholesterol has improved since last labs.  Her thyroid labs are abnormal for which she will need to follow with her PCP Dr. Shukla.    Please forward all labs results including urinalysis, microscopic, TSH and free T4 to Dr. Shukla office.    Thanks.

## 2024-08-27 NOTE — PROGRESS NOTES
Jessica Mann is a 83 y.o. female that presents to the office today with her daughter for  cardiac follow up.  She  follows with her  primary cardiologist Dr. Gutierrez and Dr. Juan  and was added to my schedule today as a follow up for Dr. Juan..      Per Dr. Juan office notes, she has a PMH of  bradycardia and also recurrent episodes of falling. Patient was admitted in January 2022 at [facility] after not feeling well for the last 2 to 3 weeks due to coldâ€“flu symptoms. She was diagnosed of Covidâ€“19 pneumonia. During this admission, patient was having significant amount of nausea and vomiting associated with syncopal episodes with evidence and documentation of significant pauses up to 15 seconds of duration on telemetry monitoring. A temporary pacer wire was placed.     History of hypertension, hyperlipidemia, normal left ventricular function per echocardiogram in 2019 and also no evidence of coronary artery disease per cardiac catheterization in 2018. Patient also had documentation of accelerated junctional rhythm by Holter monitor 2018.     Patient underwent implantation of a dual-chamber pacemaker in January 11/2022 with no complications. 3 days later she went back again to the electrophysiology 30 for repositioning of the right atrial lead secondary to spontaneous dislodgment.     Patient had a device interrogation February 2023 that shows battery longevity 11 years. RV paced 69% of the time. Atrial fibrillation burden less than 0.1% of the time.     Echocardiogram in November 2022 shows left ventricular ejection fraction of 50 to 55% with mild left ventricular hypertrophy.     She reports increased fatigue along with increasing exertional shortness of breath.  She denies any chest pain, chest pressure, chest tightness, palpitations, lightheadedness or dizziness.  No recent labs noted.      Testing Reviewed  7/8/2020 for device interrogation  Normal device function, no alerts or  events  Battery 10.3 years sensing, impedance and thresholds reviewed WNL.    Assessment/Plan    Dual-chamber pacemaker implanted 1/2022.  Device interrogation as noted above  Fatigue; will obtain labs including CMP, CBC, lipid panel, TSH and free T4  UTI; daughter reports recent UTI for which her primary has provided an order for a urinalysis they are requesting urinalysis to be obtained today along with additional labs for which I will place order, results will be forwarded to primary care Dr. Shukla to address  Shortness of breath; reports increased exertional shortness of breath.  Will obtain echocardiogram to assess her valves and pulm function  Refills provided in office today  Follow device clinic as scheduled  Follow-up with Dr. Juan in 6 months or sooner if needed  Follow-up with Dr. Gutierrez after echocardiogram for results and further recommendations.      Patient Active Problem List   Diagnosis    AAA (abdominal aortic aneurysm) (CMS-HCC)    Abnormal electrocardiogram    Abnormal results of cardiovascular function studies    Abnormal stress test    Bilateral carotid artery stenosis    Bilateral carotid bruits    CAD (coronary artery disease)    Cardiac pacemaker in situ    DJD (degenerative joint disease)    Dysphagia    Hearing loss, bilateral    HTN (hypertension)    Hyperlipidemia    Hypothyroidism    Idioventricular rhythm (Multi)    Mixed conductive and sensorineural hearing loss of both ears    Obstructive sleep apnea, adult    Primary dysthymia    Sinus node dysfunction (Multi)    TERRY (dyspnea on exertion)    Varicose vein of leg    Asymptomatic menopause    Class 1 obesity with body mass index (BMI) of 33.0 to 33.9 in adult       Social History     Tobacco Use    Smoking status: Never     Passive exposure: Never    Smokeless tobacco: Never   Substance Use Topics    Alcohol use: Not Currently    Drug use: Never       History reviewed. No pertinent past medical history.      Current Outpatient  Medications:     acetaminophen (Tylenol) 500 mg capsule, Take 1 capsule (500 mg) by mouth if needed., Disp: , Rfl:     aspirin 81 mg chewable tablet, Chew 1 tablet (81 mg) once daily., Disp: , Rfl:     citalopram (CeleXA) 10 mg tablet, Take 1 tablet (10 mg) by mouth once daily., Disp: , Rfl:     levothyroxine (Synthroid, Levoxyl) 112 mcg tablet, Take 1 tablet (112 mcg) by mouth once daily., Disp: , Rfl:     MULTIVITAMIN ORAL, Take 1 tablet by mouth once daily., Disp: , Rfl:     nitroglycerin (Nitrostat) 0.4 mg SL tablet, Take as directed as needed for chest pain. May repeat every 5 min. X3.  Call 911 if 3rd dose is needed, Disp: 25 tablet, Rfl: 5    omeprazole OTC (PriLOSEC OTC) 20 mg EC tablet, Take 1 tablet (20 mg) by mouth once daily., Disp: , Rfl:     vit A/vit C/vit E/zinc/copper (PRESERVISION AREDS ORAL), Take 1 capsule by mouth once daily., Disp: , Rfl:     atorvastatin (Lipitor) 80 mg tablet, Take 1 tablet (80 mg) by mouth once daily., Disp: 90 tablet, Rfl: 3    losartan (Cozaar) 25 mg tablet, Take 1 tablet (25 mg) by mouth once daily., Disp: 90 tablet, Rfl: 2    metoprolol succinate XL (Toprol-XL) 25 mg 24 hr tablet, Take 1 tablet (25 mg) by mouth once daily. 1 tablet twice a day, Disp: 90 tablet, Rfl: 2    spironolactone (Aldactone) 25 mg tablet, Take 0.5 tablets (12.5 mg) by mouth once daily., Disp: 90 tablet, Rfl: 2    Tramadol    Family History   Problem Relation Name Age of Onset    Other (Cardiac disorder) Sister      Stroke Sister      Heart failure Sister      Diabetes Sister      Other (Cardiac disorder) Brother      Heart failure Brother         Past Surgical History:   Procedure Laterality Date    OTHER SURGICAL HISTORY  11/11/2021    Cataract surgery    OTHER SURGICAL HISTORY  11/11/2021    Cardiac catheterization    OTHER SURGICAL HISTORY  11/11/2021    Cholecystectomy    OTHER SURGICAL HISTORY  11/11/2021    Hysterectomy    OTHER SURGICAL HISTORY  11/11/2021    Colonoscopy    OTHER SURGICAL  "HISTORY  02/16/2022    Pacemaker insertion          Review of systems  Constitutional: No weight loss, fever, chills, weakness positive for.  fatigue  HEENT: No visual loss, blurred vision, double vision or yellow sclerae  Skin: No rash or itching  Cardiovascular: No chest pain, pressure or discomfort, No palpitations or edema.  Respiratory: Positive for exertional shortness of breath, edema cough or sputum  Gastrointestinal: No nausea, vomiting or diarrhea. No bloody or dark tarry stools.  Neurological: No headache, lightheadedness, dizziness, syncope.   Musculoskeletal: No muscle, back pain, joint pain or stiffness.  Hematologic: No anemia, bleeding or bruising.    /66 (BP Location: Right arm, Patient Position: Sitting)   Pulse 73   Ht 1.626 m (5' 4\")   Wt 95.8 kg (211 lb 3.2 oz)   BMI 36.25 kg/m²     Patient Active Problem List   Diagnosis    AAA (abdominal aortic aneurysm) (CMS-HCC)    Abnormal electrocardiogram    Abnormal results of cardiovascular function studies    Abnormal stress test    Bilateral carotid artery stenosis    Bilateral carotid bruits    CAD (coronary artery disease)    Cardiac pacemaker in situ    DJD (degenerative joint disease)    Dysphagia    Hearing loss, bilateral    HTN (hypertension)    Hyperlipidemia    Hypothyroidism    Idioventricular rhythm (Multi)    Mixed conductive and sensorineural hearing loss of both ears    Obstructive sleep apnea, adult    Primary dysthymia    Sinus node dysfunction (Multi)    TERRY (dyspnea on exertion)    Varicose vein of leg    Asymptomatic menopause    Class 1 obesity with body mass index (BMI) of 33.0 to 33.9 in adult         Physical Exam  Constitutional: Well developed, awake/alert x 3, no distress.  Head/Neck: No JVD, No bruits  Respiratory/Thorax: patent airways, CTAB, normal breath sounds with good expansion.  Cardiovascular: Regular rate and rhythm, no murmurs, normal S1 and S2,   Gastrointestinal: Non distended, soft, non-tender, no " rebound tenderness or guarding.  Extremities: No cyanosis, edema.    Neurological: Alert and oriented x 3. Moves extremities spontaneous with purpose.  Psychological: Appropriate mood and behavior  Skin: Warm and Dry. No lesions or rashes.         Please excuse any errors in grammar or translation related to dictation, voice recognition software was used to prepare this document.

## 2024-09-16 ENCOUNTER — TELEPHONE (OUTPATIENT)
Dept: CARDIOLOGY | Facility: CLINIC | Age: 84
End: 2024-09-16
Payer: MEDICARE

## 2024-09-16 ENCOUNTER — APPOINTMENT (OUTPATIENT)
Dept: CARDIOLOGY | Facility: HOSPITAL | Age: 84
End: 2024-09-16
Payer: MEDICARE

## 2024-09-16 NOTE — TELEPHONE ENCOUNTER
Received voice mail from Nevaeh Ellington-patient's daughter (POA) wanting to let the physician know patient is pending an Echo today.  She continues to have shortness of breath with limited exertion  and fatigues easily.  Nevaeh states one trip into the house with a bag of groceries and patient has to sit and rest.  She also wanted to alert the physician to an uptake in headaches requiring patient to take something for it which is typically not the patient.  Note sent to Dr. Malik Leonard, F.A.C.C. for review.  ALSO NOTED-ECHO WAS CANCELLED.  Jacklyn Sheppard, CMA

## 2024-09-18 NOTE — TELEPHONE ENCOUNTER
Spoke with daughter Nevaeh.  States her mother has had increased fatigue and SOB w/ 10 ft of walking.  States pt has an increase in her headaches as well  Pt canceled ECHO and rescheduled for 9/26/2024.   Pt is to see Dr. Leonard on 9/24/2024  Pt daughter was transferred to  to try and get pt in with an earlier ECHO, so that pt and Dr. Leonard could review results when pt is to return to office.

## 2024-09-23 ENCOUNTER — ANCILLARY PROCEDURE (OUTPATIENT)
Dept: CARDIOLOGY | Facility: HOSPITAL | Age: 84
End: 2024-09-23
Payer: MEDICARE

## 2024-09-23 DIAGNOSIS — R06.09 DOE (DYSPNEA ON EXERTION): ICD-10-CM

## 2024-09-23 DIAGNOSIS — R06.00 DYSPNEA, UNSPECIFIED: ICD-10-CM

## 2024-09-23 PROCEDURE — 93306 TTE W/DOPPLER COMPLETE: CPT | Performed by: INTERNAL MEDICINE

## 2024-09-23 PROCEDURE — 93306 TTE W/DOPPLER COMPLETE: CPT

## 2024-09-24 ENCOUNTER — APPOINTMENT (OUTPATIENT)
Dept: CARDIOLOGY | Facility: CLINIC | Age: 84
End: 2024-09-24
Payer: MEDICARE

## 2024-09-24 VITALS
DIASTOLIC BLOOD PRESSURE: 64 MMHG | WEIGHT: 213 LBS | SYSTOLIC BLOOD PRESSURE: 122 MMHG | BODY MASS INDEX: 36.37 KG/M2 | HEIGHT: 64 IN | HEART RATE: 62 BPM

## 2024-09-24 DIAGNOSIS — I83.001 VARICOSE VEINS OF LOWER EXTREMITY WITH ULCER OF THIGH LIMITED TO BREAKDOWN OF SKIN, UNSPECIFIED LATERALITY: ICD-10-CM

## 2024-09-24 DIAGNOSIS — G47.33 OBSTRUCTIVE SLEEP APNEA, ADULT: ICD-10-CM

## 2024-09-24 DIAGNOSIS — I44.2 IDIOVENTRICULAR RHYTHM (MULTI): ICD-10-CM

## 2024-09-24 DIAGNOSIS — E02 SUBCLINICAL IODINE-DEFICIENCY HYPOTHYROIDISM: ICD-10-CM

## 2024-09-24 DIAGNOSIS — R94.39 ABNORMAL STRESS TEST: ICD-10-CM

## 2024-09-24 DIAGNOSIS — E66.09 CLASS 1 OBESITY DUE TO EXCESS CALORIES WITHOUT SERIOUS COMORBIDITY WITH BODY MASS INDEX (BMI) OF 33.0 TO 33.9 IN ADULT: ICD-10-CM

## 2024-09-24 DIAGNOSIS — I10 PRIMARY HYPERTENSION: ICD-10-CM

## 2024-09-24 DIAGNOSIS — I25.10 CORONARY ARTERY DISEASE INVOLVING NATIVE CORONARY ARTERY OF NATIVE HEART WITHOUT ANGINA PECTORIS: ICD-10-CM

## 2024-09-24 DIAGNOSIS — L97.101 VARICOSE VEINS OF LOWER EXTREMITY WITH ULCER OF THIGH LIMITED TO BREAKDOWN OF SKIN, UNSPECIFIED LATERALITY: ICD-10-CM

## 2024-09-24 DIAGNOSIS — I71.30 RUPTURED ABDOMINAL AORTIC ANEURYSM (AAA), UNSPECIFIED PART (MULTI): ICD-10-CM

## 2024-09-24 DIAGNOSIS — I65.23 BILATERAL CAROTID ARTERY STENOSIS: ICD-10-CM

## 2024-09-24 DIAGNOSIS — M15.8 OTHER OSTEOARTHRITIS INVOLVING MULTIPLE JOINTS: ICD-10-CM

## 2024-09-24 DIAGNOSIS — R94.30 ABNORMAL RESULTS OF CARDIOVASCULAR FUNCTION STUDIES: ICD-10-CM

## 2024-09-24 DIAGNOSIS — R06.09 DOE (DYSPNEA ON EXERTION): ICD-10-CM

## 2024-09-24 DIAGNOSIS — I49.5 SINUS NODE DYSFUNCTION (MULTI): ICD-10-CM

## 2024-09-24 DIAGNOSIS — R09.89 BILATERAL CAROTID BRUITS: ICD-10-CM

## 2024-09-24 DIAGNOSIS — Z95.0 CARDIAC PACEMAKER IN SITU: ICD-10-CM

## 2024-09-24 DIAGNOSIS — E78.2 MIXED HYPERLIPIDEMIA: ICD-10-CM

## 2024-09-24 DIAGNOSIS — R94.31 ABNORMAL ELECTROCARDIOGRAM: ICD-10-CM

## 2024-09-24 DIAGNOSIS — E66.811 CLASS 1 OBESITY DUE TO EXCESS CALORIES WITHOUT SERIOUS COMORBIDITY WITH BODY MASS INDEX (BMI) OF 33.0 TO 33.9 IN ADULT: ICD-10-CM

## 2024-09-24 LAB
AORTIC VALVE MEAN GRADIENT: 6 MMHG
AORTIC VALVE PEAK VELOCITY: 1.45 M/S
AV PEAK GRADIENT: 8.4 MMHG
AVA (PEAK VEL): 4.07 CM2
AVA (VTI): 4.01 CM2
EJECTION FRACTION APICAL 4 CHAMBER: 54.2
EJECTION FRACTION: 60 %
LEFT VENTRICLE INTERNAL DIMENSION DIASTOLE: 4.55 CM (ref 3.5–6)
LEFT VENTRICULAR OUTFLOW TRACT DIAMETER: 2.7 CM
LV EJECTION FRACTION BIPLANE: 53 %
MITRAL VALVE E/A RATIO: 0.8
MITRAL VALVE E/E' RATIO: 11.2
RIGHT VENTRICLE PEAK SYSTOLIC PRESSURE: 25.3 MMHG

## 2024-09-24 PROCEDURE — 3074F SYST BP LT 130 MM HG: CPT | Performed by: INTERNAL MEDICINE

## 2024-09-24 PROCEDURE — 3078F DIAST BP <80 MM HG: CPT | Performed by: INTERNAL MEDICINE

## 2024-09-24 PROCEDURE — 1157F ADVNC CARE PLAN IN RCRD: CPT | Performed by: INTERNAL MEDICINE

## 2024-09-24 PROCEDURE — 99214 OFFICE O/P EST MOD 30 MIN: CPT | Performed by: INTERNAL MEDICINE

## 2024-09-24 PROCEDURE — 1159F MED LIST DOCD IN RCRD: CPT | Performed by: INTERNAL MEDICINE

## 2024-09-24 RX ORDER — ATORVASTATIN CALCIUM 80 MG/1
80 TABLET, FILM COATED ORAL DAILY
Qty: 90 TABLET | Refills: 3 | Status: SHIPPED | OUTPATIENT
Start: 2024-09-24

## 2024-09-24 RX ORDER — METOPROLOL SUCCINATE 25 MG/1
25 TABLET, EXTENDED RELEASE ORAL 2 TIMES DAILY
Qty: 180 TABLET | Refills: 3 | Status: SHIPPED | OUTPATIENT
Start: 2024-09-24

## 2024-09-24 RX ORDER — NITROGLYCERIN 0.4 MG/1
TABLET SUBLINGUAL
Qty: 25 TABLET | Refills: 5 | Status: SHIPPED | OUTPATIENT
Start: 2024-09-24

## 2024-09-24 RX ORDER — SPIRONOLACTONE 25 MG/1
12.5 TABLET ORAL DAILY
Qty: 90 TABLET | Refills: 3 | Status: SHIPPED | OUTPATIENT
Start: 2024-09-24

## 2024-09-24 RX ORDER — LOSARTAN POTASSIUM 25 MG/1
25 TABLET ORAL DAILY
Qty: 90 TABLET | Refills: 3 | Status: SHIPPED | OUTPATIENT
Start: 2024-09-24 | End: 2025-09-24

## 2024-09-24 NOTE — PROGRESS NOTES
CARDIOLOGY OFFICE NOTE     Date:   9/24/2024    Patient:    Jessica Mann    YOB: 1940    Primary Physician: Elias Shukla MD       Reason for Visit: 1 year follow-up visit.    HPI:     Jessica Mann was seen in cardiac evaluation at the  Cardiology office September 24, 2024.      The patients problems are listed as in the impression below.    Electronic medical records reviewed.    Patient continues to do well overall.  No complaints.  Active without limitations.    Patient denies Chest Pain, SOB, Lightheadedness, Dizziness, TIA or CVA symptoms.  No CHF or Edema.  No Palpitations.  No GI,  or Bleeding Issues. No Recent Fever or Chills.     Cardiovascular and general review of systems is otherwise negative.    A 14-system review is otherwise negative, other than noted.     PHYSICAL EXAMINATION:      Vitals:    09/24/24 1105   BP: 122/64   Pulse: 62     General: No acute distress. Alert and oriented.  Head And Neck Examination: No jugular venous distention, no carotid bruits, no mass. Carotid upstrokes preserved. Oral mucosa moist. No xanthelasma. Head and neck examination otherwise unremarkable.  Lungs: Clear to auscultation and percussion. No wheezes, no rales, and no rhonchi.  Chest: Excursion appeared to be normal. No chest wall tenderness on palpation.  Heart: Normal S1 and S2. No S3. No S4. No rub. Grade 1/6 systolic murmur, best heard at the left sternal border. Point of maximal impulse was within normal limits. Pacemaker left chest.  Abdomen: Soft. Nontender. No organomegaly. No bruits. No masses. Obese.  Extremities: Trace bipedal edema. No clubbing. No cyanosis. Pulses are strong throughout. No bruits.  Musculoskeletal Exam: No ulcers, otherwise unremarkable.  Neuro: Neurologically appeared grossly intact.  .  IMPRESSION:      Cardiovascular status stable  Fatigue  Bradycardia, resolved  Sick sinus syndrome, post [employer] preserved XT DR DOWNEY January  2022.  Abnormal resting electrocardiogram (first-degree AV block, poor with ventricular progression, left axis deviation).  Negative cardiac catheterization for significant coronary artery disease 12/6/2019.  Abnormal Lexiscan Myoview perfusion stress test for mild anterior lateral apical ischemia, November 2019.  Negative echocardiogram, LVEF 60%, 9/2024.  Abdominal aortic aneurysm, follow-up abdominal ultrasound 7/2023 measured 2.1 x 2.6 cm.  Prior negative stress test 1996.  Prior negative echocardiogram 1996 and November 2019.  Negative carotid ultrasound for significant carotid artery disease, ICAs less than 50%, November 2019.  History of varicose veins.  Obstructive sleep apnea not on CPAP.  Hypertension  Hyperlipidemia  Lymphedema  Peptic ulcer disease  Degenerative joint disease with bilateral knee discomfort  Prior hysterectomy  Prior cholecystectomy  Recurrent falls  Covid pneumonia, January 2020.  Family history of cardiovascular (congestive heart failure, coronary bypass, pacemaker).  Otherwise as per assessment below.    RECOMMENDATIONS:      Patient continues to do well overall.  Would suggest continuing current medications.  Refills were provided.    Exercise dietary program.    Hydration.    [employer] portal use was encouraged.    We will plan to see back in 1 year with Laboratory Studies and ECG as ordered.     Patient will follow up with their primary physician for general care.    The patient knows to contact medical care earlier if need be.      ALLERGIES:     Allergies   Allergen Reactions    Tramadol Unknown        MEDICATIONS:     Current Outpatient Medications   Medication Instructions    acetaminophen (TYLENOL) 500 mg, oral, As needed    aspirin 81 mg, oral, Daily RT    atorvastatin (LIPITOR) 80 mg, oral, Daily    citalopram (CeleXA) 10 mg tablet 1 tablet, oral, Daily    levothyroxine (Synthroid, Levoxyl) 112 mcg tablet 1 tablet, oral, Daily    losartan (COZAAR) 25 mg, oral, Daily     metoprolol succinate XL (TOPROL-XL) 25 mg, oral, 2 times daily    nitroglycerin (Nitrostat) 0.4 mg SL tablet Take as directed as needed for chest pain. May repeat every 5 min. X3.  Call 911 if 3rd dose is needed    omeprazole OTC (PriLOSEC OTC) 20 mg EC tablet 1 tablet, oral, Daily    spironolactone (ALDACTONE) 12.5 mg, oral, Daily    vit A/vit C/vit E/zinc/copper (PRESERVISION AREDS ORAL) 1 capsule, oral, Daily       ELECTROCARDIOGRAM:      None this visit    CARDIAC TESTING:      Echocardiogram, 9/2024:  Normal LV systolic function, ejection fraction 60%  LV diastolic dysfunction  Mild left ventricular hypertrophy concentric  No significant valvular heart disease.    LABORATORY DATA:      CBC:   Lab Results   Component Value Date    WBC 7.0 08/26/2024    RBC 4.47 08/26/2024    HGB 11.8 (L) 08/26/2024    HCT 38.3 08/26/2024     08/26/2024        CMP:    Lab Results   Component Value Date     08/26/2024    K 4.2 08/26/2024     08/26/2024    CO2 27 08/26/2024    BUN 16 08/26/2024    CREATININE 0.66 08/26/2024    GLUCOSE 104 (H) 08/26/2024    CALCIUM 9.0 08/26/2024       Magnesium:    Lab Results   Component Value Date    MG 1.71 08/26/2024       Lipid Profile:    Lab Results   Component Value Date    CHOL 125 08/26/2024    TRIG 127 08/26/2024    HDL 43.3 08/26/2024    LDLF 140 (H) 04/28/2021   LDL 8/2024: 56.    Hepatic Function Panel:    Lab Results   Component Value Date    ALKPHOS 82 08/26/2024    ALT 11 08/26/2024    AST 13 08/26/2024    PROT 6.1 (L) 08/26/2024    BILITOT 0.5 08/26/2024    BILIDIR 0.1 08/26/2024       TSH:    Lab Results   Component Value Date    TSH 0.30 (L) 08/26/2024                   PROBLEM LIST:     Patient Active Problem List   Diagnosis    AAA (abdominal aortic aneurysm) (CMS-[facility])    Abnormal electrocardiogram    Abnormal results of cardiovascular function studies    Abnormal stress test    Bilateral carotid artery stenosis    Bilateral carotid bruits    CAD (coronary  artery disease)    Cardiac pacemaker in situ    DJD (degenerative joint disease)    Dysphagia    Hearing loss, bilateral    HTN (hypertension)    Hyperlipidemia    Hypothyroidism    Idioventricular rhythm (Multi)    Mixed conductive and sensorineural hearing loss of both ears    Obstructive sleep apnea, adult    Primary dysthymia    Sinus node dysfunction (Multi)    TERRY (dyspnea on exertion)    Varicose vein of leg    Asymptomatic menopause    Class 1 obesity with body mass index (BMI) of 33.0 to 33.9 in adult             Malik Leonard MD, [facility] /  Cardiology      Of Note:  [employer] voice recognition dictation software was utilized partially in the preparation of this note, therefore, inaccuracies in spelling, word choice and punctuation may have occurred which were not recognized the time of signing.    Patient was seen and examined with total time of visit including chart preparation, rooming, and chart completion exceeding 40 minutes.      ----

## 2024-09-24 NOTE — PATIENT INSTRUCTIONS
FASTING LABS, FASTING FROM MIDNIGHT THE NIGHT BEFORE TO BE DONE 1 WEEK PRIOR TO YOUR APPOINTMENT WITH DR BUSH IN 1 YEAR    BE ACTIVE-DO EXERCISES AT HOME WHEN WATCHING YOUR PROGRAMS-DO LEG AND ARM LIFTS.  USE YOUR MUSCLES--      DID YOU KNOW  We have a pharmacy here in the [facility].  They can fill all prescriptions, not just cardiac medications.  Prescriptions from other pharmacies can easily be transferred to the  pharmacy by the  pharmacist on site.   pharmacies offer FREE HOME DELIVERY on medications to anywhere in [place]. They can sync your medications. Typically prescriptions can be ready in 10 - 15 minutes. If pharmacy is unable to fill your  prescription or if cost is more than your paying now the Pharmacist can easily transfer back to your Pharmacy of choice. Pharmacy phone # [phone].      Please bring all medicines, vitamins, and herbal supplements with you in original bottles to every appointment! This is the best way to ensure your medication list in your chart is accurate.    Prescriptions will not be filled unless you are compliant with your follow up appointments or have a follow up appointment scheduled as per instruction of your physician. Refills should be requested at the time of your visit.

## 2024-09-26 ENCOUNTER — APPOINTMENT (OUTPATIENT)
Dept: CARDIOLOGY | Facility: HOSPITAL | Age: 84
End: 2024-09-26
Payer: MEDICARE

## 2024-10-14 ENCOUNTER — HOSPITAL ENCOUNTER (OUTPATIENT)
Dept: CARDIOLOGY | Age: 84
Discharge: HOME | End: 2024-10-14
Payer: MEDICARE

## 2024-10-14 PROCEDURE — 93294 REM INTERROG EVL PM/LDLS PM: CPT | Performed by: INTERNAL MEDICINE

## 2024-10-14 PROCEDURE — 93296 REM INTERROG EVL PM/IDS: CPT

## 2025-01-14 ENCOUNTER — OFFICE VISIT (OUTPATIENT)
Dept: FAMILY MEDICINE CLINIC | Age: 85
End: 2025-01-14
Payer: MEDICARE

## 2025-01-14 VITALS
SYSTOLIC BLOOD PRESSURE: 124 MMHG | HEIGHT: 64 IN | TEMPERATURE: 97.8 F | DIASTOLIC BLOOD PRESSURE: 58 MMHG | WEIGHT: 207 LBS | OXYGEN SATURATION: 98 % | HEART RATE: 73 BPM | BODY MASS INDEX: 35.34 KG/M2

## 2025-01-14 DIAGNOSIS — L08.9 TOE INFECTION: Primary | ICD-10-CM

## 2025-01-14 DIAGNOSIS — L03.032 CELLULITIS OF TOE OF LEFT FOOT: ICD-10-CM

## 2025-01-14 PROCEDURE — 1159F MED LIST DOCD IN RCRD: CPT | Performed by: NURSE PRACTITIONER

## 2025-01-14 PROCEDURE — 99214 OFFICE O/P EST MOD 30 MIN: CPT | Performed by: NURSE PRACTITIONER

## 2025-01-14 PROCEDURE — 1160F RVW MEDS BY RX/DR IN RCRD: CPT | Performed by: NURSE PRACTITIONER

## 2025-01-14 PROCEDURE — 1123F ACP DISCUSS/DSCN MKR DOCD: CPT | Performed by: NURSE PRACTITIONER

## 2025-01-14 RX ORDER — CEPHALEXIN 500 MG/1
500 CAPSULE ORAL 2 TIMES DAILY
Qty: 20 CAPSULE | Refills: 0 | Status: SHIPPED | OUTPATIENT
Start: 2025-01-14 | End: 2025-01-24

## 2025-01-14 RX ORDER — SULFAMETHOXAZOLE AND TRIMETHOPRIM 800; 160 MG/1; MG/1
1 TABLET ORAL 2 TIMES DAILY
Qty: 20 TABLET | Refills: 0 | Status: SHIPPED | OUTPATIENT
Start: 2025-01-14 | End: 2025-01-24

## 2025-01-14 RX ORDER — CEPHALEXIN 500 MG/1
500 CAPSULE ORAL 2 TIMES DAILY
COMMUNITY
Start: 2024-10-15 | End: 2025-01-14 | Stop reason: ALTCHOICE

## 2025-01-14 ASSESSMENT — ENCOUNTER SYMPTOMS
DIARRHEA: 0
TROUBLE SWALLOWING: 0
RHINORRHEA: 0
EYE REDNESS: 0
SHORTNESS OF BREATH: 0
SORE THROAT: 0
COUGH: 0
VOMITING: 0
WHEEZING: 0
NAUSEA: 0

## 2025-01-14 NOTE — PROGRESS NOTES
Subjective:      Patient ID: Hanane Gilliland is a 84 y.o. female who presents today for:  Chief Complaint   Patient presents with    Toe Pain     Pt is coming in with the LT pinky toe infected.    Onset- Friday night first noticed  Redness, swelling- YES  Pus discharge- YES,   Pain- YES, worsening. Worst on Saturday.  Pain scale-10, sharp shooting pain in the foot, leg shakes.  Treatments- Epsom salt soak on Sunday, Monday morning the toe \"looked weird.\" OTC pain killers tried, along with ice and elevating the foot.  Underlying medical conditions (diabetes?)-NO  Fever/chills-NO       HPI    She does not recall ever injuring the toe  She dose however have an unsteady gait and uses a wheeled walker  She suddenly felt pain in her toe on Friday  She says it was originally a blister    This has since opened up and is draining some   There is some redness onto the top of the foot and she says this is new  She has a band aid on it   She has been eating okay  Denies DM   She feels hungry  No fevers     Past Medical History:   Diagnosis Date    Abdominal aortic aneurysm without rupture (HCC)     Age-related macular degeneration     Dehydration     Esophageal reflux     Falls frequently 01/02/2022    Hearing loss     High blood pressure     HIGH CHOLESTEROL     Hyperlipidemia     Hypothyroidism     Imbalance     Incontinence     Nausea     Obesity     Orthostatic dizziness     Osteoarthrosis     Overactive bladder     Thyroid disease      Past Surgical History:   Procedure Laterality Date    CATARACT REMOVAL      CHOLECYSTECTOMY      CHOLECYSTECTOMY      EYE SURGERY Bilateral     cataract    HYSTERECTOMY (CERVIX STATUS UNKNOWN)      IR NEUROSTIMULATOR PLACEMENT  07/28/2021    NERVE SURGERY N/A 7/28/2021    STAGE 1 & STAGE 2 INTERSTIM performed by Isi Dutta MD at [facility] OR    PACEMAKER PLACEMENT  01/2022     Social History     Socioeconomic History    Marital status:      Spouse name: Not on file    Number of

## 2025-01-20 ENCOUNTER — TELEPHONE (OUTPATIENT)
Dept: FAMILY MEDICINE CLINIC | Age: 85
End: 2025-01-20

## 2025-01-20 NOTE — TELEPHONE ENCOUNTER
Pt saw the walk-in and does not see a PCP at our location. Sending this to Jennifer in hopes that she will prescribe an alternative medication for patient.    Please advise and let me know the best course of action here!

## 2025-01-20 NOTE — TELEPHONE ENCOUNTER
Pt was prescribed 2 antibiotics and they are both making her sick.  Please call the pt to get something for her that won't make her ill.    Ph [phone]

## 2025-01-21 NOTE — TELEPHONE ENCOUNTER
Pt is following up with wound center on Thursday and pt is advised to discuss antbx side effects with them.

## 2025-01-23 ENCOUNTER — HOSPITAL ENCOUNTER (OUTPATIENT)
Dept: WOUND CARE | Age: 85
Discharge: HOME | End: 2025-01-23
Attending: PODIATRIST
Payer: MEDICARE

## 2025-01-23 ENCOUNTER — HOSPITAL ENCOUNTER (OUTPATIENT)
Dept: CARDIOLOGY | Age: 85
Discharge: HOME | End: 2025-01-23

## 2025-01-23 VITALS
TEMPERATURE: 97 F | HEART RATE: 66 BPM | RESPIRATION RATE: 18 BRPM | SYSTOLIC BLOOD PRESSURE: 124 MMHG | DIASTOLIC BLOOD PRESSURE: 64 MMHG

## 2025-01-23 DIAGNOSIS — L97.522 ULCER OF TOE OF LEFT FOOT, WITH FAT LAYER EXPOSED (HCC): Primary | ICD-10-CM

## 2025-01-23 PROCEDURE — 11042 DBRDMT SUBQ TIS 1ST 20SQCM/<: CPT

## 2025-01-23 PROCEDURE — 99213 OFFICE O/P EST LOW 20 MIN: CPT

## 2025-01-23 RX ORDER — LIDOCAINE HYDROCHLORIDE 20 MG/ML
JELLY TOPICAL PRN
OUTPATIENT
Start: 2025-01-23

## 2025-01-23 RX ORDER — LIDOCAINE 40 MG/G
CREAM TOPICAL PRN
OUTPATIENT
Start: 2025-01-23

## 2025-01-23 RX ORDER — LIDOCAINE HYDROCHLORIDE 40 MG/ML
SOLUTION TOPICAL PRN
OUTPATIENT
Start: 2025-01-23

## 2025-01-23 RX ORDER — TRIAMCINOLONE ACETONIDE 1 MG/G
OINTMENT TOPICAL PRN
OUTPATIENT
Start: 2025-01-23

## 2025-01-23 RX ORDER — GENTAMICIN SULFATE 1 MG/G
CREAM TOPICAL
Qty: 15 G | Refills: 1 | Status: SHIPPED | OUTPATIENT
Start: 2025-01-23

## 2025-01-23 RX ORDER — SILVER SULFADIAZINE 10 MG/G
CREAM TOPICAL PRN
OUTPATIENT
Start: 2025-01-23

## 2025-01-23 RX ORDER — CLOBETASOL PROPIONATE 0.5 MG/G
OINTMENT TOPICAL PRN
OUTPATIENT
Start: 2025-01-23

## 2025-01-23 RX ORDER — NEOMYCIN/BACITRACIN/POLYMYXINB 3.5-400-5K
OINTMENT (GRAM) TOPICAL PRN
OUTPATIENT
Start: 2025-01-23

## 2025-01-23 RX ORDER — SODIUM CHLOR/HYPOCHLOROUS ACID 0.033 %
SOLUTION, IRRIGATION IRRIGATION PRN
OUTPATIENT
Start: 2025-01-23

## 2025-01-23 RX ORDER — GENTAMICIN SULFATE 1 MG/G
OINTMENT TOPICAL PRN
OUTPATIENT
Start: 2025-01-23

## 2025-01-23 RX ORDER — BACITRACIN ZINC AND POLYMYXIN B SULFATE 500; 1000 [USP'U]/G; [USP'U]/G
OINTMENT TOPICAL PRN
OUTPATIENT
Start: 2025-01-23

## 2025-01-23 RX ORDER — BETAMETHASONE DIPROPIONATE 0.5 MG/G
CREAM TOPICAL PRN
OUTPATIENT
Start: 2025-01-23

## 2025-01-23 RX ORDER — GINSENG 100 MG
CAPSULE ORAL PRN
OUTPATIENT
Start: 2025-01-23

## 2025-01-23 RX ORDER — MUPIROCIN 20 MG/G
OINTMENT TOPICAL PRN
OUTPATIENT
Start: 2025-01-23

## 2025-01-23 RX ORDER — LIDOCAINE 50 MG/G
OINTMENT TOPICAL PRN
OUTPATIENT
Start: 2025-01-23

## 2025-01-23 RX ORDER — SODIUM CHLORIDE 0.9 %
1 AEROSOL, SPRAY (ML) TOPICAL DAILY
Qty: 210 ML | Refills: 5 | Status: SHIPPED | OUTPATIENT
Start: 2025-01-23

## 2025-01-23 NOTE — PLAN OF CARE
Problem: Cognitive:  Goal: Knowledge of wound care  Description: Knowledge of wound care  Outcome: Progressing  Goal: Understands risk factors for wounds  Description: Understands risk factors for wounds  Outcome: Progressing     Problem: Wound:  Goal: Will show signs of wound healing; wound closure and no evidence of infection  Description: Will show signs of wound healing; wound closure and no evidence of infection  Outcome: Progressing     
no

## 2025-01-23 NOTE — PROGRESS NOTES
[facility]                                                   Progress Note and Procedure Note      Hanane Gilliland  MEDICAL RECORD NUMBER:  [id number]  AGE: 84 y.o.   GENDER: female  : 1940  EPISODE DATE:  2025    Subjective:     Chief Complaint   Patient presents with    Wound Check         HISTORY of PRESENT ILLNESS HPI     Hanane Gilliland is a 84 y.o. female who presents today for wound/ulcer evaluation.   History of Wound Context: Patient presents for treatment of a wound to the left fifth toe.  Patient states that she first noticed the wound several weeks ago.  Patient does not recall injury to the toe.  Patient was seen in urgent care and was prescribed oral Bactrim and cephalexin, she states she developed GI distress and stopped taking the medication.  Patient patient states that the wound has been dry for several days and she has not been putting a dressing on it because her physical limitations she is unable to dress herself.  Patient states that she lives alone.    Patient denies nausea, vomiting, fever, chills, chest pain, or shortness of breath.    Wound/Ulcer Pain Timing/Severity: intermittent  Quality of pain: dull, tender  Severity: Mild  Modifying Factors: Pain worsens with walking and Pain worsens with pressure to the toe  Associated Signs/Symptoms: edema, erythema, and drainage    Ulcer Identification:  Ulcer Type: traumatic  Contributing Factors: decreased mobility    Wound:  Full-thickness ulceration left fifth toe        PAST MEDICAL HISTORY        Diagnosis Date    Abdominal aortic aneurysm without rupture (HCC)     Age-related macular degeneration     Dehydration     Esophageal reflux     Falls frequently 2022    Hearing loss     High blood pressure     HIGH CHOLESTEROL     Hyperlipidemia     Hypothyroidism     Imbalance     Incontinence     Nausea     Obesity     Orthostatic dizziness     Osteoarthrosis     Overactive bladder     Thyroid disease

## 2025-01-23 NOTE — DISCHARGE INSTRUCTIONS
[facility]   Physician Orders and Discharge Instructions  [facility]  [address] [place]  [address]  Telephone: [phone]      [phone]      NAME:  Hanane Gilliland          YOB: 1940  MEDICAL RECORD NUMBER:  [id number]    Your  is:  Leah    Home Care/Facility: New Referral made for Home Health    Wound Location: Left 5th toe    Dressing orders: Cleanse with Normal Saline  Apply Gentamicin ointment on wound bed  Cover with a dry dressing  Change every other day    Compression: None    Offloading Device: none    Other Instructions: .Please  your prescription  Gentamicin  ointment and Normal Saline your pharmacy and take/use as prescribed.Please do not get the wound wet, if you shower (no bathing) you can pick-up a \"cast-cover\" at your local pharmacy.    Keep all dressings clean, dry and intact.  Keep pressure off the wound(s) at all times.     Follow up visit   1 Week(s) January 30, 2025 @ 2:15    Please give 24 hour notice if unable to keep appointment. [phone]    If you experience any of the following, please call the Wound Care Service at  [phone] or go to the nearest emergency room.   *Increase in pain *Temperature over 101 *Increase in drainage from your wound or a foul odor  *Uncontrolled swelling *Need for compression bandage changes due to slippage, breakthrough drainage       PLEASE NOTE: IF YOU ARE UNABLE TO OBTAIN WOUND SUPPLIES, CONTINUE TO USE THE SUPPLIES YOU HAVE AVAILABLE UNTIL YOU ARE ABLE TO REACH US. IT IS MOST IMPORTANT TO KEEP THE WOUND COVERED AT ALL TIMES

## 2025-01-27 ENCOUNTER — HOSPITAL ENCOUNTER (OUTPATIENT)
Dept: CARDIOLOGY | Age: 85
Discharge: HOME | End: 2025-01-27
Payer: MEDICARE

## 2025-01-27 PROCEDURE — 93296 REM INTERROG EVL PM/IDS: CPT

## 2025-01-27 PROCEDURE — 93294 REM INTERROG EVL PM/LDLS PM: CPT | Performed by: INTERNAL MEDICINE

## 2025-01-30 ENCOUNTER — HOSPITAL ENCOUNTER (OUTPATIENT)
Dept: WOUND CARE | Age: 85
Discharge: HOME | End: 2025-01-30
Attending: PODIATRIST
Payer: MEDICARE

## 2025-01-30 VITALS
SYSTOLIC BLOOD PRESSURE: 122 MMHG | DIASTOLIC BLOOD PRESSURE: 52 MMHG | HEART RATE: 58 BPM | RESPIRATION RATE: 15 BRPM | TEMPERATURE: 97.1 F

## 2025-01-30 DIAGNOSIS — L97.522 ULCER OF TOE OF LEFT FOOT, WITH FAT LAYER EXPOSED (HCC): Primary | ICD-10-CM

## 2025-01-30 PROCEDURE — 99213 OFFICE O/P EST LOW 20 MIN: CPT | Performed by: PODIATRIST

## 2025-01-30 PROCEDURE — 99213 OFFICE O/P EST LOW 20 MIN: CPT

## 2025-01-30 RX ORDER — BACITRACIN ZINC AND POLYMYXIN B SULFATE 500; 1000 [USP'U]/G; [USP'U]/G
OINTMENT TOPICAL PRN
Status: DISCONTINUED | OUTPATIENT
Start: 2025-01-30 | End: 2025-01-31 | Stop reason: HOSPADM

## 2025-01-30 RX ORDER — GINSENG 100 MG
CAPSULE ORAL PRN
Status: DISCONTINUED | OUTPATIENT
Start: 2025-01-30 | End: 2025-01-31 | Stop reason: HOSPADM

## 2025-01-30 RX ORDER — NEOMYCIN/BACITRACIN/POLYMYXINB 3.5-400-5K
OINTMENT (GRAM) TOPICAL PRN
Status: DISCONTINUED | OUTPATIENT
Start: 2025-01-30 | End: 2025-01-31 | Stop reason: HOSPADM

## 2025-01-30 RX ORDER — LIDOCAINE HYDROCHLORIDE 20 MG/ML
JELLY TOPICAL PRN
Status: DISCONTINUED | OUTPATIENT
Start: 2025-01-30 | End: 2025-01-31 | Stop reason: HOSPADM

## 2025-01-30 RX ORDER — BETAMETHASONE DIPROPIONATE 0.5 MG/G
CREAM TOPICAL PRN
Status: DISCONTINUED | OUTPATIENT
Start: 2025-01-30 | End: 2025-01-31 | Stop reason: HOSPADM

## 2025-01-30 RX ORDER — MUPIROCIN 20 MG/G
OINTMENT TOPICAL PRN
Status: DISCONTINUED | OUTPATIENT
Start: 2025-01-30 | End: 2025-01-31 | Stop reason: HOSPADM

## 2025-01-30 RX ORDER — BACITRACIN ZINC AND POLYMYXIN B SULFATE 500; 1000 [USP'U]/G; [USP'U]/G
OINTMENT TOPICAL PRN
Status: CANCELLED | OUTPATIENT
Start: 2025-01-30

## 2025-01-30 RX ORDER — CLOBETASOL PROPIONATE 0.5 MG/G
OINTMENT TOPICAL PRN
Status: CANCELLED | OUTPATIENT
Start: 2025-01-30

## 2025-01-30 RX ORDER — GENTAMICIN SULFATE 1 MG/G
OINTMENT TOPICAL PRN
Status: CANCELLED | OUTPATIENT
Start: 2025-01-30

## 2025-01-30 RX ORDER — SODIUM CHLOR/HYPOCHLOROUS ACID 0.033 %
SOLUTION, IRRIGATION IRRIGATION PRN
Status: DISCONTINUED | OUTPATIENT
Start: 2025-01-30 | End: 2025-01-31 | Stop reason: HOSPADM

## 2025-01-30 RX ORDER — GINSENG 100 MG
CAPSULE ORAL PRN
Status: CANCELLED | OUTPATIENT
Start: 2025-01-30

## 2025-01-30 RX ORDER — SILVER SULFADIAZINE 10 MG/G
CREAM TOPICAL PRN
Status: DISCONTINUED | OUTPATIENT
Start: 2025-01-30 | End: 2025-01-31 | Stop reason: HOSPADM

## 2025-01-30 RX ORDER — LIDOCAINE 40 MG/G
CREAM TOPICAL PRN
Status: DISCONTINUED | OUTPATIENT
Start: 2025-01-30 | End: 2025-01-31 | Stop reason: HOSPADM

## 2025-01-30 RX ORDER — LIDOCAINE HYDROCHLORIDE 40 MG/ML
SOLUTION TOPICAL PRN
Status: CANCELLED | OUTPATIENT
Start: 2025-01-30

## 2025-01-30 RX ORDER — BETAMETHASONE DIPROPIONATE 0.5 MG/G
CREAM TOPICAL PRN
Status: CANCELLED | OUTPATIENT
Start: 2025-01-30

## 2025-01-30 RX ORDER — NEOMYCIN/BACITRACIN/POLYMYXINB 3.5-400-5K
OINTMENT (GRAM) TOPICAL PRN
Status: CANCELLED | OUTPATIENT
Start: 2025-01-30

## 2025-01-30 RX ORDER — SILVER SULFADIAZINE 10 MG/G
CREAM TOPICAL PRN
Status: CANCELLED | OUTPATIENT
Start: 2025-01-30

## 2025-01-30 RX ORDER — GENTAMICIN SULFATE 1 MG/G
OINTMENT TOPICAL PRN
Status: DISCONTINUED | OUTPATIENT
Start: 2025-01-30 | End: 2025-01-31 | Stop reason: HOSPADM

## 2025-01-30 RX ORDER — TRIAMCINOLONE ACETONIDE 1 MG/G
OINTMENT TOPICAL PRN
Status: CANCELLED | OUTPATIENT
Start: 2025-01-30

## 2025-01-30 RX ORDER — LIDOCAINE 50 MG/G
OINTMENT TOPICAL PRN
Status: DISCONTINUED | OUTPATIENT
Start: 2025-01-30 | End: 2025-01-31 | Stop reason: HOSPADM

## 2025-01-30 RX ORDER — LIDOCAINE HYDROCHLORIDE 20 MG/ML
JELLY TOPICAL PRN
Status: CANCELLED | OUTPATIENT
Start: 2025-01-30

## 2025-01-30 RX ORDER — TRIAMCINOLONE ACETONIDE 1 MG/G
OINTMENT TOPICAL PRN
Status: DISCONTINUED | OUTPATIENT
Start: 2025-01-30 | End: 2025-01-31 | Stop reason: HOSPADM

## 2025-01-30 RX ORDER — LIDOCAINE 50 MG/G
OINTMENT TOPICAL PRN
Status: CANCELLED | OUTPATIENT
Start: 2025-01-30

## 2025-01-30 RX ORDER — LIDOCAINE 40 MG/G
CREAM TOPICAL PRN
Status: CANCELLED | OUTPATIENT
Start: 2025-01-30

## 2025-01-30 RX ORDER — CLOBETASOL PROPIONATE 0.5 MG/G
OINTMENT TOPICAL PRN
Status: DISCONTINUED | OUTPATIENT
Start: 2025-01-30 | End: 2025-01-31 | Stop reason: HOSPADM

## 2025-01-30 RX ORDER — SODIUM CHLOR/HYPOCHLOROUS ACID 0.033 %
SOLUTION, IRRIGATION IRRIGATION PRN
Status: CANCELLED | OUTPATIENT
Start: 2025-01-30

## 2025-01-30 RX ORDER — MUPIROCIN 20 MG/G
OINTMENT TOPICAL PRN
Status: CANCELLED | OUTPATIENT
Start: 2025-01-30

## 2025-01-30 RX ORDER — LIDOCAINE HYDROCHLORIDE 40 MG/ML
SOLUTION TOPICAL PRN
Status: DISCONTINUED | OUTPATIENT
Start: 2025-01-30 | End: 2025-01-31 | Stop reason: HOSPADM

## 2025-01-30 NOTE — DISCHARGE INSTRUCTIONS
Wound Clinic Physician Orders and Discharge Instructions  [address] [address]  Telephone: [phone]     FAX [phone]    NAME:  Hanane Gilliland  YOB: 1940  MEDICAL RECORD NUMBER:  [id number]  DATE:  1/30/2025                                            [facility]    Congratulations!! You have completed your treatment.     1. Return to your Primary Care Physician for all your health issues.   2. Resume your ordinary activities as tolerated.   3. Take your medications as prescribed by your primary care physician.   4. Check your skin daily for cracks, bruises, sores, or dryness. Use a moisturizer as needed.   5. Clean and dry your skin, using mild soap and warm water (not hot).   6. Avoid alcohol and caffeine and do not smoke.   7. Maintain a nutritious diet.   8. Avoid pressure on your wound site. Keep your legs elevated above the level of the heart whenever possible.   9. Continue to use wraps/stockings/compression as prescribed.   10. Replace compression stockings every four to six months as needed to ensure proper fit.   11. Wear well-fitting shoes and leg garments.     Today in the wound clinic place a piece of dry gauze or cotton ball between the left 4th and 5th toe for the next 3-4 days    THANK YOU FOR ALLOWING US TO SERVE YOU. PLEASE CALL IF YOU DEVELOP ANOTHER WOUND. [phone]

## 2025-01-30 NOTE — PROGRESS NOTES
[facility]   Progress Note and Procedure Note      Hanane Gilliland  MEDICAL RECORD NUMBER:  [id number]  AGE: 84 y.o.   GENDER: female  : 1940  EPISODE DATE:  2025    Subjective:     No chief complaint on file.        HISTORY of PRESENT ILLNESS HPI     Hanane Gilliland is a 84 y.o. female who presents today for wound/ulcer evaluation.   History of Wound Context: Patient presents for continued treatment of an ulceration of the left fifth toe.  Patient states home health care has been after visit her since her last visit.  Patient denies observing drainage.  Patient reports decreased symptoms.    Patient denies nausea, vomiting, fever, chills, chest pain, or shortness of breath.     Wound/Ulcer Pain Timing/Severity: none  Quality of pain: N/A  Severity:  0 / 10   Modifying Factors: None  Associated Signs/Symptoms: erythema    Ulcer Identification:  Ulcer Type: traumatic  Contributing Factors: shear force    Wound: N/A        PAST MEDICAL HISTORY        Diagnosis Date    Abdominal aortic aneurysm without rupture (HCC)     Age-related macular degeneration     Dehydration     Esophageal reflux     Falls frequently 2022    Hearing loss     High blood pressure     HIGH CHOLESTEROL     Hyperlipidemia     Hypothyroidism     Imbalance     Incontinence     Nausea     Obesity     Orthostatic dizziness     Osteoarthrosis     Overactive bladder     Thyroid disease        PAST SURGICAL HISTORY    Past Surgical History:   Procedure Laterality Date    CATARACT REMOVAL      CHOLECYSTECTOMY      CHOLECYSTECTOMY      EYE SURGERY Bilateral     cataract    HYSTERECTOMY (CERVIX STATUS UNKNOWN)      IR NEUROSTIMULATOR PLACEMENT  2021    NERVE SURGERY N/A 2021    STAGE 1 & STAGE 2 INTERSTIM performed by Isi Dutta MD at [facility] [place]    PACEMAKER PLACEMENT  2022       FAMILY HISTORY    History reviewed. No pertinent family history.    SOCIAL HISTORY    Social History     Tobacco Use    Smoking

## 2025-02-24 ENCOUNTER — APPOINTMENT (OUTPATIENT)
Dept: CARDIOLOGY | Facility: CLINIC | Age: 85
End: 2025-02-24
Payer: MEDICARE

## 2025-02-24 VITALS
SYSTOLIC BLOOD PRESSURE: 130 MMHG | HEART RATE: 74 BPM | HEIGHT: 64 IN | DIASTOLIC BLOOD PRESSURE: 68 MMHG | WEIGHT: 205.4 LBS | BODY MASS INDEX: 35.07 KG/M2

## 2025-02-24 DIAGNOSIS — R06.09 DOE (DYSPNEA ON EXERTION): ICD-10-CM

## 2025-02-24 DIAGNOSIS — I49.5 SINUS NODE DYSFUNCTION (MULTI): ICD-10-CM

## 2025-02-24 DIAGNOSIS — Z95.0 CARDIAC PACEMAKER IN SITU: ICD-10-CM

## 2025-02-24 DIAGNOSIS — Z78.9 NEVER SMOKED TOBACCO: ICD-10-CM

## 2025-02-24 DIAGNOSIS — E78.2 MIXED HYPERLIPIDEMIA: ICD-10-CM

## 2025-02-24 DIAGNOSIS — I10 PRIMARY HYPERTENSION: ICD-10-CM

## 2025-02-24 PROCEDURE — 99214 OFFICE O/P EST MOD 30 MIN: CPT | Performed by: INTERNAL MEDICINE

## 2025-02-24 PROCEDURE — 1159F MED LIST DOCD IN RCRD: CPT | Performed by: INTERNAL MEDICINE

## 2025-02-24 PROCEDURE — 3075F SYST BP GE 130 - 139MM HG: CPT | Performed by: INTERNAL MEDICINE

## 2025-02-24 PROCEDURE — 3078F DIAST BP <80 MM HG: CPT | Performed by: INTERNAL MEDICINE

## 2025-02-24 PROCEDURE — 1157F ADVNC CARE PLAN IN RCRD: CPT | Performed by: INTERNAL MEDICINE

## 2025-02-24 NOTE — PROGRESS NOTES
CARDIOLOGY OFFICE VISIT      CHIEF COMPLAINT  No chief complaint on file.      HISTORY OF PRESENT ILLNESS  HPI  84-year-old female with a past medical history of bradycardia and also recurrent episodes of falling. Patient was admitted in January 2022 at [facility] after not feeling well for the last 2 to 3 weeks due to coldâ€“flu symptoms. She was diagnosed of Covidâ€“19 pneumonia. During this admission, patient was having significant amount of nausea and vomiting associated with syncopal episodes with evidence and documentation of significant pauses up to 15 seconds of duration on telemetry monitoring. A temporary pacer wire was placed.     History of hypertension, hyperlipidemia, normal left ventricular function per echocardiogram in 2019 and also no evidence of coronary artery disease per cardiac catheterization in 2018. Patient also had documentation of accelerated junctional rhythm by Holter monitor 2018.     Patient underwent implantation of a dual-chamber pacemaker in January 11/2022 with no complications. 3 days later she went back again to the electrophysiology 30 for repositioning of the right atrial lead secondary to spontaneous dislodgment.      Echocardiogram in November 2023 shows left ventricular ejection fraction of 50 to 55% with mild left ventricular hypertrophy. TSH in January 2023 shows 3.3.     Admitted 12/2024 at [facility]. She presented with worsening pain in R thigh that limited her mobility. She was also diagnosed with UTI and treated with rocephin. XR femur unremarkable. Orthopedic surgery consulted, recommended conservative management. PT/OT followed patient.    Patient lost completely follow-up with electrophysiology service since 2023.  She is also hide she has been doing well.  Denies any symptoms like chest pain or shortness of breath or palpitations.    Patient had a device interrogation January 31, 202.  Patient does have a dual-chamber pacemaker [employer] with battery longevity  9.58 years.  No evidence of atrial fibrillation 1 brief episode of nonsustained ventricular tachycardia lasting   1 second, rate of 182 bpm.  Asymptomatic.        Past Medical History  No past medical history on file.    Social History  Social History     Tobacco Use    Smoking status: Never     Passive exposure: Never    Smokeless tobacco: Never   Substance Use Topics    Alcohol use: Not Currently    Drug use: Never       Family History     Family History   Problem Relation Name Age of Onset    Other (Cardiac disorder) Sister      Stroke Sister      Heart failure Sister      Diabetes Sister      Other (Cardiac disorder) Brother      Heart failure Brother          Allergies:  Allergies   Allergen Reactions    Tramadol Unknown        Outpatient Medications:  Current Outpatient Medications   Medication Instructions    acetaminophen (TYLENOL) 500 mg, As needed    aspirin 81 mg, Daily RT    atorvastatin (LIPITOR) 80 mg, oral, Daily    citalopram (CeleXA) 10 mg tablet 1 tablet, Daily    levothyroxine (Synthroid, Levoxyl) 112 mcg tablet 1 tablet, Daily    losartan (COZAAR) 25 mg, oral, Daily    metoprolol succinate XL (TOPROL-XL) 25 mg, oral, 2 times daily    nitroglycerin (Nitrostat) 0.4 mg SL tablet Take as directed as needed for chest pain. May repeat every 5 min. X3.  Call 911 if 3rd dose is needed    omeprazole OTC (PriLOSEC OTC) 20 mg EC tablet 1 tablet, Daily    spironolactone (ALDACTONE) 12.5 mg, oral, Daily    vit A/vit C/vit E/zinc/copper (PRESERVISION AREDS ORAL) 1 capsule, Daily          REVIEW OF SYSTEMS  ROS      VITALS  Vitals:    02/24/25 1208   BP: 130/68   Pulse: 74       PHYSICAL EXAM  Constitutional:       Appearance: Healthy appearance. Not in distress.   Neck:      Vascular: No JVR. JVD normal.   Pulmonary:      Effort: Pulmonary effort is normal.      Breath sounds: Normal breath sounds. No wheezing. No rhonchi. No rales.   Chest:      Chest wall: Not tender to palpatation.   Cardiovascular:       PMI at left midclavicular line. Normal rate. Regular rhythm. Normal S1. Normal S2.       Murmurs: There is no murmur.      No gallop.  No click. No rub.      Comments: Device in the left prepectoral healing well.  No signs of hematoma or infection.     Pulses:     Intact distal pulses.   Edema:     Peripheral edema absent.   Abdominal:      General: Bowel sounds are normal.      Palpations: Abdomen is soft.      Tenderness: There is no abdominal tenderness.   Musculoskeletal: Normal range of motion.         General: No tenderness. Skin:     General: Skin is warm and dry.   Neurological:      General: No focal deficit present.      Mental Status: Alert and oriented to person, place and time.           ASSESSMENT AND PLAN        Clinical impression     1. COVID-19 pneumonia, recovering  2. Syncope associated with significant pauses up to 15 seconds seen on telemetry  3. Report dual-chamber pacemaker implanted in January 2022, healing well.  4. Normal left ventricular function per echocardiogram as described above  5. No significant coronary artery disease per cardiac catheterization described above  6. Right ankle fracture after falling due to syncope    Plan recommendations    Patient is doing well from the electrophysiologist on point to continue with follows my office every 6 months or sooner if needed    Follow device clinic as scheduled.    Continue with beta-blocker therapy.    Risk factor modification and lifestyle modification discussed with patient. Diet , exercise and hydration discussed with patient.    I have personally review with patient during this office visit, laboratory data, echocardiogram results, stress test results, Holter-event monitor results prior and after the last electrophysiology visit. All questions has been answered.    Please excuse any errors in grammar or translation related to this dictation.  Voice recognition software was utilized to prepare this document.

## 2025-02-24 NOTE — PATIENT INSTRUCTIONS
PER DR VALENTE YOU HAVE BEEN GIVEN A LIST OF YOUR MEDICATIONS.  PLEASE COMPARE THEM TO YOUR MEDICATIONS AT HOME AND CALL THE OFFICE [phone] WITH ANY CHANGES THAT HAVE TO BE UPDATED IN YOUR CHART.    DID YOU KNOW  We have a pharmacy here in the [facility].  They can fill all prescriptions, not just cardiac medications.  Prescriptions from other pharmacies can easily be transferred to the  pharmacy by the  pharmacist on site.   pharmacies offer FREE HOME DELIVERY on medications to anywhere in [place]. They can sync your medications. Typically prescriptions can be ready in 10 - 15 minutes. If pharmacy is unable to fill your  prescription or if cost is more than your paying now the Pharmacist can easily transfer back to your Pharmacy of choice. Pharmacy phone # [phone].     Please bring all medicines, vitamins, and herbal supplements with you in original bottles to every appointment! This is the best way to ensure your medication list in your chart is accurate.    Prescriptions will not be filled unless you are compliant with your follow up appointments or have a follow up appointment scheduled as per instruction of your physician. Refills should be requested at the time of your visit.

## 2025-04-30 ENCOUNTER — HOSPITAL ENCOUNTER (OUTPATIENT)
Dept: CARDIOLOGY | Age: 85
Discharge: HOME | End: 2025-04-30
Payer: MEDICARE

## 2025-04-30 PROCEDURE — 93296 REM INTERROG EVL PM/IDS: CPT

## 2025-04-30 PROCEDURE — 93294 REM INTERROG EVL PM/LDLS PM: CPT | Performed by: INTERNAL MEDICINE

## 2025-05-02 ENCOUNTER — TELEPHONE (OUTPATIENT)
Dept: CARDIOLOGY | Facility: CLINIC | Age: 85
End: 2025-05-02
Payer: MEDICARE

## 2025-05-02 DIAGNOSIS — Z95.0 CARDIAC PACEMAKER IN SITU: ICD-10-CM

## 2025-05-02 NOTE — TELEPHONE ENCOUNTER
Rec'd fax from [facility] device clinic requesting  order for services rendered.  Order entered for date of service rendered and x next year for routine device checks in-clinic and remotely or as directed by physician.

## 2025-07-08 ENCOUNTER — TELEPHONE (OUTPATIENT)
Dept: CARDIOLOGY | Facility: CLINIC | Age: 85
End: 2025-07-08
Payer: MEDICARE

## 2025-07-08 DIAGNOSIS — I10 PRIMARY HYPERTENSION: ICD-10-CM

## 2025-07-08 DIAGNOSIS — I25.10 CORONARY ARTERY DISEASE INVOLVING NATIVE CORONARY ARTERY OF NATIVE HEART WITHOUT ANGINA PECTORIS: ICD-10-CM

## 2025-07-08 NOTE — TELEPHONE ENCOUNTER
Patient's daughter called to let Dr. Leonard know that Jessica was recently seen by Dr. Shukla her PCP at the clinic and she was advised to take metoprolol succinate 25mg once daily instead of twice daily due to recent BP readings.   Her next OV with Dr. Weaver is 9/26/25

## 2025-08-05 ENCOUNTER — OFFICE VISIT (OUTPATIENT)
Dept: FAMILY MEDICINE CLINIC | Age: 85
End: 2025-08-05
Payer: MEDICARE

## 2025-08-05 VITALS
TEMPERATURE: 98.7 F | DIASTOLIC BLOOD PRESSURE: 62 MMHG | HEART RATE: 62 BPM | SYSTOLIC BLOOD PRESSURE: 128 MMHG | WEIGHT: 207 LBS | OXYGEN SATURATION: 96 % | HEIGHT: 64 IN | BODY MASS INDEX: 35.34 KG/M2

## 2025-08-05 DIAGNOSIS — N30.01 ACUTE CYSTITIS WITH HEMATURIA: Primary | ICD-10-CM

## 2025-08-05 DIAGNOSIS — M25.511 ACUTE PAIN OF RIGHT SHOULDER: ICD-10-CM

## 2025-08-05 DIAGNOSIS — R35.0 URINARY FREQUENCY: ICD-10-CM

## 2025-08-05 LAB
BILIRUBIN, POC: NORMAL
BLOOD URINE, POC: 25
CLARITY, POC: NORMAL
COLOR, POC: YELLOW
GLUCOSE URINE, POC: NORMAL MG/DL
KETONES, POC: NORMAL MG/DL
LEUKOCYTE EST, POC: 125
NITRITE, POC: NORMAL
PH, POC: 6
PROTEIN, POC: NORMAL MG/DL
SPECIFIC GRAVITY, POC: 1.01
UROBILINOGEN, POC: 0.2 MG/DL

## 2025-08-05 PROCEDURE — 1159F MED LIST DOCD IN RCRD: CPT | Performed by: NURSE PRACTITIONER

## 2025-08-05 PROCEDURE — 99214 OFFICE O/P EST MOD 30 MIN: CPT | Performed by: NURSE PRACTITIONER

## 2025-08-05 PROCEDURE — 1123F ACP DISCUSS/DSCN MKR DOCD: CPT | Performed by: NURSE PRACTITIONER

## 2025-08-05 PROCEDURE — 81003 URINALYSIS AUTO W/O SCOPE: CPT | Performed by: NURSE PRACTITIONER

## 2025-08-05 PROCEDURE — 1125F AMNT PAIN NOTED PAIN PRSNT: CPT | Performed by: NURSE PRACTITIONER

## 2025-08-05 RX ORDER — FAMOTIDINE 20 MG/1
20 TABLET, FILM COATED ORAL 2 TIMES DAILY
COMMUNITY
Start: 2025-07-16 | End: 2025-08-15

## 2025-08-05 RX ORDER — ATORVASTATIN CALCIUM 80 MG/1
TABLET, FILM COATED ORAL
COMMUNITY
Start: 2025-07-15

## 2025-08-05 RX ORDER — DULOXETIN HYDROCHLORIDE 20 MG/1
20 CAPSULE, DELAYED RELEASE ORAL DAILY
COMMUNITY
Start: 2025-07-07

## 2025-08-05 RX ORDER — UREA 10 %
45 LOTION (ML) TOPICAL DAILY
COMMUNITY
Start: 2025-07-17 | End: 2025-09-15

## 2025-08-05 RX ORDER — POLYETHYLENE GLYCOL 3350 17 G/17G
POWDER, FOR SOLUTION ORAL
COMMUNITY

## 2025-08-05 RX ORDER — CEPHALEXIN 500 MG/1
500 CAPSULE ORAL 2 TIMES DAILY
Qty: 14 CAPSULE | Refills: 0 | Status: SHIPPED | OUTPATIENT
Start: 2025-08-05 | End: 2025-08-12

## 2025-08-05 SDOH — ECONOMIC STABILITY: FOOD INSECURITY: WITHIN THE PAST 12 MONTHS, THE FOOD YOU BOUGHT JUST DIDN'T LAST AND YOU DIDN'T HAVE MONEY TO GET MORE.: NEVER TRUE

## 2025-08-05 SDOH — ECONOMIC STABILITY: FOOD INSECURITY: WITHIN THE PAST 12 MONTHS, YOU WORRIED THAT YOUR FOOD WOULD RUN OUT BEFORE YOU GOT MONEY TO BUY MORE.: NEVER TRUE

## 2025-08-05 ASSESSMENT — ENCOUNTER SYMPTOMS
BACK PAIN: 0
COLOR CHANGE: 0
ABDOMINAL PAIN: 0
DIARRHEA: 0
CONSTIPATION: 0
NAUSEA: 0
VOMITING: 0

## 2025-08-05 ASSESSMENT — PATIENT HEALTH QUESTIONNAIRE - PHQ9
SUM OF ALL RESPONSES TO PHQ QUESTIONS 1-9: 0
1. LITTLE INTEREST OR PLEASURE IN DOING THINGS: NOT AT ALL
SUM OF ALL RESPONSES TO PHQ QUESTIONS 1-9: 0
SUM OF ALL RESPONSES TO PHQ QUESTIONS 1-9: 0
2. FEELING DOWN, DEPRESSED OR HOPELESS: NOT AT ALL
SUM OF ALL RESPONSES TO PHQ QUESTIONS 1-9: 0

## 2025-08-06 ENCOUNTER — OFFICE VISIT (OUTPATIENT)
Age: 85
End: 2025-08-06

## 2025-08-06 ENCOUNTER — HOSPITAL ENCOUNTER (OUTPATIENT)
Dept: CARDIOLOGY | Age: 85
Discharge: HOME | End: 2025-08-06
Payer: MEDICARE

## 2025-08-06 VITALS
BODY MASS INDEX: 35.34 KG/M2 | WEIGHT: 207 LBS | HEIGHT: 64 IN | OXYGEN SATURATION: 97 % | TEMPERATURE: 97.5 F | HEART RATE: 68 BPM

## 2025-08-06 DIAGNOSIS — M12.811 ROTATOR CUFF TEAR ARTHROPATHY OF RIGHT SHOULDER: Primary | ICD-10-CM

## 2025-08-06 DIAGNOSIS — M75.101 ROTATOR CUFF TEAR ARTHROPATHY OF RIGHT SHOULDER: Primary | ICD-10-CM

## 2025-08-06 PROCEDURE — 93296 REM INTERROG EVL PM/IDS: CPT

## 2025-08-06 PROCEDURE — 93294 REM INTERROG EVL PM/LDLS PM: CPT | Performed by: INTERNAL MEDICINE

## 2025-08-06 RX ORDER — LIDOCAINE HYDROCHLORIDE 10 MG/ML
8 INJECTION, SOLUTION INFILTRATION; PERINEURAL ONCE
Status: COMPLETED | OUTPATIENT
Start: 2025-08-06 | End: 2025-08-06

## 2025-08-06 RX ORDER — TRIAMCINOLONE ACETONIDE 40 MG/ML
80 INJECTION, SUSPENSION INTRA-ARTICULAR; INTRAMUSCULAR ONCE
Status: COMPLETED | OUTPATIENT
Start: 2025-08-06 | End: 2025-08-06

## 2025-08-06 RX ADMIN — TRIAMCINOLONE ACETONIDE 80 MG: 40 INJECTION, SUSPENSION INTRA-ARTICULAR; INTRAMUSCULAR at 12:35

## 2025-08-06 RX ADMIN — LIDOCAINE HYDROCHLORIDE 8 ML: 10 INJECTION, SOLUTION INFILTRATION; PERINEURAL at 12:35

## 2025-08-06 ASSESSMENT — ENCOUNTER SYMPTOMS
RESPIRATORY NEGATIVE: 1
EYES NEGATIVE: 1
GASTROINTESTINAL NEGATIVE: 1

## 2025-08-07 ENCOUNTER — RESULTS FOLLOW-UP (OUTPATIENT)
Dept: FAMILY MEDICINE CLINIC | Age: 85
End: 2025-08-07

## 2025-08-07 LAB
BACTERIA UR CULT: ABNORMAL
BACTERIA UR CULT: ABNORMAL
ORGANISM: ABNORMAL

## 2025-09-08 ENCOUNTER — APPOINTMENT (OUTPATIENT)
Dept: CARDIOLOGY | Facility: CLINIC | Age: 85
End: 2025-09-08
Payer: MEDICARE

## 2025-09-26 ENCOUNTER — APPOINTMENT (OUTPATIENT)
Dept: CARDIOLOGY | Facility: CLINIC | Age: 85
End: 2025-09-26
Payer: MEDICARE

## (undated) DEVICE — COUNTER NDL 40 COUNT HLD 70 FOAM BLK ADH W/ MAG

## (undated) DEVICE — YANKAUER,BULB TIP,W/O VENT,RIGID,STERILE: Brand: MEDLINE

## (undated) DEVICE — KIT ARMOR C DRP COLLAPSIBLE AND SELF EXP TOP CVR FOR FLUOROSCOPIC

## (undated) DEVICE — SYRINGE IRRIG 60ML SFT PLIABLE BLB EZ TO GRP 1 HND USE W/

## (undated) DEVICE — SUTURE PERMAHAND SZ 2-0 L12X18IN NONABSORBABLE BLK SILK A185H

## (undated) DEVICE — HYPODERMIC SAFETY NEEDLE: Brand: MAGELLAN

## (undated) DEVICE — INTENDED FOR TISSUE SEPARATION, AND OTHER PROCEDURES THAT REQUIRE A SHARP SURGICAL BLADE TO PUNCTURE OR CUT.: Brand: BARD-PARKER ® CARBON RIB-BACK BLADES

## (undated) DEVICE — GOWN,AURORA,NONREINFORCED,LARGE: Brand: MEDLINE

## (undated) DEVICE — LABEL MED MINI W/ MARKER

## (undated) DEVICE — PROGRAMMER PATIENT SMART INTERSTIM

## (undated) DEVICE — SUTURE VCRL SZ 2-0 L36IN ABSRB UD L36MM CT-1 1/2 CIR J945H

## (undated) DEVICE — TRAY PREP DRY W/ PREM GLV 2 APPL 6 SPNG 2 UNDPD 1 OVERWRAP

## (undated) DEVICE — SYRINGE MED 10ML TRNSLUC BRL PLUNG BLK MRK POLYPR CTRL

## (undated) DEVICE — GLOVE SURG SZ 85 L12IN FNGR THK94MIL TRNSLUC YEL LTX

## (undated) DEVICE — ELECTRODE PT RET AD L9FT HI MOIST COND ADH HYDRGEL CORDED

## (undated) DEVICE — TOWEL,OR,DSP,ST,BLUE,STD,4/PK,20PK/CS: Brand: MEDLINE

## (undated) DEVICE — SUTURE MCRYL SZ 4-0 L27IN ABSRB UD L19MM PS-2 1/2 CIR PRIM Y426H

## (undated) DEVICE — MARKER SURG SKIN GENTIAN VLT REG TIP W/ 6IN RUL

## (undated) DEVICE — TUBING, SUCTION, 1/4" X 10', STRAIGHT: Brand: MEDLINE

## (undated) DEVICE — 1010 S-DRAPE TOWEL DRAPE 10/BX: Brand: STERI-DRAPE™

## (undated) DEVICE — COVER LT HNDL BLU PLAS

## (undated) DEVICE — NEUROSTIMULATOR EXT SM LTWT SGL BTTN H2O RESIST WIRELESS

## (undated) DEVICE — SINGLE PORT MANIFOLD: Brand: NEPTUNE 2

## (undated) DEVICE — NEPTUNE E-SEP SMOKE EVACUATION PENCIL, COATED, 70MM BLADE, PUSH BUTTON SWITCH: Brand: NEPTUNE E-SEP

## (undated) DEVICE — GOWN,SIRUS,POLYRNF,BRTHSLV,XLN/XL,20/CS: Brand: MEDLINE

## (undated) DEVICE — PACK,LAPAROTOMY,NO GOWNS: Brand: MEDLINE

## (undated) DEVICE — SPONGE,LAP,18"X18",DLX,XR,ST,5/PK,40/PK: Brand: MEDLINE

## (undated) DEVICE — ADHESIVE SKIN CLSR 0.7ML TOP DERMBND ADV